# Patient Record
Sex: MALE | Race: WHITE | NOT HISPANIC OR LATINO | Employment: OTHER | URBAN - METROPOLITAN AREA
[De-identification: names, ages, dates, MRNs, and addresses within clinical notes are randomized per-mention and may not be internally consistent; named-entity substitution may affect disease eponyms.]

---

## 2017-01-31 DIAGNOSIS — E78.5 HYPERLIPIDEMIA, UNSPECIFIED HYPERLIPIDEMIA TYPE: Primary | ICD-10-CM

## 2017-02-01 ENCOUNTER — LAB VISIT (OUTPATIENT)
Dept: LAB | Facility: HOSPITAL | Age: 82
End: 2017-02-01
Attending: FAMILY MEDICINE
Payer: MEDICARE

## 2017-02-01 DIAGNOSIS — E78.5 HYPERLIPIDEMIA, UNSPECIFIED HYPERLIPIDEMIA TYPE: ICD-10-CM

## 2017-02-01 LAB
ALBUMIN SERPL BCP-MCNC: 3.7 G/DL
ALP SERPL-CCNC: 58 U/L
ALT SERPL W/O P-5'-P-CCNC: 12 U/L
ANION GAP SERPL CALC-SCNC: 8 MMOL/L
AST SERPL-CCNC: 19 U/L
BILIRUB SERPL-MCNC: 0.8 MG/DL
BUN SERPL-MCNC: 19 MG/DL
CALCIUM SERPL-MCNC: 8.9 MG/DL
CHLORIDE SERPL-SCNC: 108 MMOL/L
CHOLEST/HDLC SERPL: 3 {RATIO}
CO2 SERPL-SCNC: 24 MMOL/L
CREAT SERPL-MCNC: 1 MG/DL
EST. GFR  (AFRICAN AMERICAN): >60 ML/MIN/1.73 M^2
EST. GFR  (NON AFRICAN AMERICAN): >60 ML/MIN/1.73 M^2
GLUCOSE SERPL-MCNC: 98 MG/DL
HDL/CHOLESTEROL RATIO: 33.2 %
HDLC SERPL-MCNC: 190 MG/DL
HDLC SERPL-MCNC: 63 MG/DL
LDLC SERPL CALC-MCNC: 110.2 MG/DL
NONHDLC SERPL-MCNC: 127 MG/DL
POTASSIUM SERPL-SCNC: 4 MMOL/L
PROT SERPL-MCNC: 7 G/DL
SODIUM SERPL-SCNC: 140 MMOL/L
TRIGL SERPL-MCNC: 84 MG/DL

## 2017-02-01 PROCEDURE — 36415 COLL VENOUS BLD VENIPUNCTURE: CPT | Mod: PO

## 2017-02-01 PROCEDURE — 80061 LIPID PANEL: CPT

## 2017-02-01 PROCEDURE — 80053 COMPREHEN METABOLIC PANEL: CPT

## 2017-02-08 ENCOUNTER — OFFICE VISIT (OUTPATIENT)
Dept: FAMILY MEDICINE | Facility: CLINIC | Age: 82
End: 2017-02-08
Payer: MEDICARE

## 2017-02-08 VITALS
BODY MASS INDEX: 27.17 KG/M2 | RESPIRATION RATE: 18 BRPM | DIASTOLIC BLOOD PRESSURE: 86 MMHG | WEIGHT: 200.63 LBS | HEART RATE: 60 BPM | HEIGHT: 72 IN | SYSTOLIC BLOOD PRESSURE: 122 MMHG

## 2017-02-08 DIAGNOSIS — J06.9 UPPER RESPIRATORY TRACT INFECTION, UNSPECIFIED TYPE: Primary | ICD-10-CM

## 2017-02-08 DIAGNOSIS — E78.5 HYPERLIPIDEMIA, UNSPECIFIED HYPERLIPIDEMIA TYPE: ICD-10-CM

## 2017-02-08 DIAGNOSIS — G62.9 PERIPHERAL POLYNEUROPATHY: ICD-10-CM

## 2017-02-08 PROCEDURE — 99213 OFFICE O/P EST LOW 20 MIN: CPT | Mod: PBBFAC,PO | Performed by: FAMILY MEDICINE

## 2017-02-08 PROCEDURE — 99214 OFFICE O/P EST MOD 30 MIN: CPT | Mod: S$PBB,,, | Performed by: FAMILY MEDICINE

## 2017-02-08 PROCEDURE — 99999 PR PBB SHADOW E&M-EST. PATIENT-LVL III: CPT | Mod: PBBFAC,,, | Performed by: FAMILY MEDICINE

## 2017-02-08 RX ORDER — GABAPENTIN 300 MG/1
300 CAPSULE ORAL 2 TIMES DAILY
Qty: 180 CAPSULE | Refills: 3 | Status: SHIPPED | OUTPATIENT
Start: 2017-02-08 | End: 2018-07-31 | Stop reason: SDUPTHER

## 2017-02-08 NOTE — MR AVS SNAPSHOT
[facility]  [address]  [facility] [address]  Phone: [phone]  Fax: [phone]                  Zara Gonzáles   2017 9:30 AM   Office Visit    Description:  Male : 1933   Provider:  Fran Duncan MD   Department:  [facility]           Reason for Visit     Hyperlipidemia           Diagnoses this Visit        Comments    Upper respiratory tract infection, unspecified type    -  Primary     Hyperlipidemia, unspecified hyperlipidemia type         Peripheral polyneuropathy                To Do List           Future Appointments        Provider Department Dept Phone    2017 2:15 PM Fran Duncan MD [facility] [phone]      Goals (5 Years of Data)     None       These Medications        Disp Refills Start End    gabapentin (NEURONTIN) 300 MG capsule 180 capsule 3 2017     Take 1 capsule (300 mg total) by mouth 2 (two) times daily. - Oral    [facility] [address] [place] [address] Ph #: [phone]         [facility]     [facility] -  Assistance  Registered nurses in the [facility] provide clinical advisement, health education, appointment booking, and other advisory services.  Call for this free service at [phone].             Medications           Message regarding Medications     Verify the changes and/or additions to your medication regime listed below are the same as discussed with your clinician today.  If any of these changes or additions are incorrect, please notify your healthcare provider.        CHANGE how you are taking these medications     Start Taking Instead of    gabapentin (NEURONTIN) 300 MG capsule gabapentin (NEURONTIN) 300 MG capsule    Dosage:  Take 1 capsule (300 mg total) by mouth 2 (two) times daily. Dosage:  TAKE ONE CAPSULE BY MOUTH ONCE DAILY    Reason for Change:   Reorder            Verify that the below list of medications is an accurate representation of the medications you are currently taking.  If none reported, the list may be blank. If incorrect, please contact your healthcare provider. Carry this list with you in case of emergency.           Current Medications     aspirin (ECOTRIN) 81 MG EC tablet Take 81 mg by mouth once daily.    gabapentin (NEURONTIN) 300 MG capsule Take 1 capsule (300 mg total) by mouth 2 (two) times daily.    pravastatin (PRAVACHOL) 40 MG tablet TAKE ONE TABLET BY MOUTH ONCE DAILY           Clinical Reference Information           Your Vitals Were     BP Pulse Resp Height Weight BMI    122/86 (BP Location: Right arm, Patient Position: Sitting, BP Method: Manual) 60 18 6' (1.829 m) 91 kg (200 lb 9.9 oz) 27.21 kg/m2      Blood Pressure          Most Recent Value    BP  122/86      Allergies as of 2/8/2017     Sulfa (Sulfonamide Antibiotics)      Immunizations Administered on Date of Encounter - 2/8/2017     None      Language Assistance Services     ATTENTION: Language assistance services are available, free of charge. Please call [phone].      ATENCIÓN: Si habla español, tiene a kat disposición servicios gratuitos de asistencia lingüística. Llame al [phone].     ROSE MARY Ý: N?u b?n nói Ti?ng Vi?t, có các d?ch v? h? tr? ngôn ng? mi?n phí dành cho b?n. G?i s? [phone].         [facility] complies with applicable Federal civil rights laws and does not discriminate on the basis of race, color, national origin, age, disability, or sex.

## 2017-02-08 NOTE — PROGRESS NOTES
HPI  Zara Gonzáles is a 83 y.o. male with multiple medical diagnoses as listed in the medical history and problem list that presents for Hyperlipidemia (follow up; hm due: tetanus, zoster )  .      URI    This is a new problem. The current episode started in the past 7 days. There has been no fever. Associated symptoms include congestion, coughing and headaches. Pertinent negatives include no abdominal pain, chest pain, diarrhea, ear pain, nausea, neck pain or vomiting. He has tried decongestant for the symptoms. The treatment provided mild relief.       PAST MEDICAL HISTORY:  Past Medical History   Diagnosis Date    GERD (gastroesophageal reflux disease)     Hyperlipidemia        PAST SURGICAL HISTORY:  Past Surgical History   Procedure Laterality Date    Tonsillectomy, adenoidectomy         SOCIAL HISTORY:  Social History     Social History    Marital status:      Spouse name: N/A    Number of children: N/A    Years of education: N/A     Occupational History    Not on file.     Social History Main Topics    Smoking status: Former Smoker     Quit date: 8/16/1973    Smokeless tobacco: Never Used    Alcohol use No    Drug use: No    Sexual activity: Not on file     Other Topics Concern    Not on file     Social History Narrative       FAMILY HISTORY:  Family History   Problem Relation Age of Onset    Coronary artery disease Mother     Coronary artery disease Brother        ALLERGIES AND MEDICATIONS: updated and reviewed.  Review of patient's allergies indicates:   Allergen Reactions    Sulfa (sulfonamide antibiotics)      rash     Current Outpatient Prescriptions   Medication Sig Dispense Refill    aspirin (ECOTRIN) 81 MG EC tablet Take 81 mg by mouth once daily.      gabapentin (NEURONTIN) 300 MG capsule Take 1 capsule (300 mg total) by mouth 2 (two) times daily. 180 capsule 3    pravastatin (PRAVACHOL) 40 MG tablet TAKE ONE TABLET BY MOUTH ONCE DAILY 30 tablet 3     No current  facility-administered medications for this visit.        ROS  Review of Systems   Constitutional: Negative for appetite change and fatigue.   HENT: Positive for congestion. Negative for ear pain and hearing loss.    Eyes: Negative for visual disturbance.   Respiratory: Positive for cough. Negative for chest tightness and shortness of breath.    Cardiovascular: Negative for chest pain and palpitations.   Gastrointestinal: Negative for abdominal pain, blood in stool, constipation, diarrhea, nausea and vomiting.   Genitourinary: Negative for difficulty urinating.   Musculoskeletal: Negative for back pain, joint swelling, neck pain and neck stiffness.   Skin: Negative.    Neurological: Positive for numbness (in feet unchanged) and headaches. Negative for weakness.   Psychiatric/Behavioral: Negative for dysphoric mood.       Physical Exam  Vitals:    02/08/17 0944   BP: 122/86   Pulse: 60   Resp: 18    Body mass index is 27.21 kg/(m^2).  Weight: 91 kg (200 lb 9.9 oz)   Height: 6' (182.9 cm)     Physical Exam   Constitutional: He is oriented to person, place, and time. He appears well-developed and well-nourished. No distress.   HENT:   Head: Normocephalic and atraumatic.   Right Ear: External ear normal.   Left Ear: External ear normal.   Eyes: Conjunctivae and EOM are normal. Pupils are equal, round, and reactive to light. No scleral icterus.   Neck: Normal range of motion. Neck supple. No JVD present. No thyromegaly present.   Cardiovascular: Normal rate, regular rhythm and intact distal pulses.  Exam reveals no gallop and no friction rub.    No murmur heard.  Pulmonary/Chest: Effort normal and breath sounds normal. He has no wheezes. He has no rales.   Abdominal: Soft. Bowel sounds are normal. He exhibits no distension and no mass. There is no tenderness.   Musculoskeletal: Normal range of motion. He exhibits no edema or tenderness.   Lymphadenopathy:     He has no cervical adenopathy.   Neurological: He is alert and  oriented to person, place, and time. No cranial nerve deficit. Coordination normal.   Skin: Skin is warm and dry. No rash noted.   Psychiatric: He has a normal mood and affect.   Vitals reviewed.    Results for orders placed or performed in visit on 02/01/17   Comprehensive metabolic panel   Result Value Ref Range    Sodium 140 136 - 145 mmol/L    Potassium 4.0 3.5 - 5.1 mmol/L    Chloride 108 95 - 110 mmol/L    CO2 24 23 - 29 mmol/L    Glucose 98 70 - 110 mg/dL    BUN, Bld 19 8 - 23 mg/dL    Creatinine 1.0 0.5 - 1.4 mg/dL    Calcium 8.9 8.7 - 10.5 mg/dL    Total Protein 7.0 6.0 - 8.4 g/dL    Albumin 3.7 3.5 - 5.2 g/dL    Total Bilirubin 0.8 0.1 - 1.0 mg/dL    Alkaline Phosphatase 58 55 - 135 U/L    AST 19 10 - 40 U/L    ALT 12 10 - 44 U/L    Anion Gap 8 8 - 16 mmol/L    eGFR if African American >60.0 >60 mL/min/1.73 m^2    eGFR if non African American >60.0 >60 mL/min/1.73 m^2   Lipid panel   Result Value Ref Range    Cholesterol 190 120 - 199 mg/dL    Triglycerides 84 30 - 150 mg/dL    HDL 63 40 - 75 mg/dL    LDL Cholesterol 110.2 63.0 - 159.0 mg/dL    HDL/Chol Ratio 33.2 20.0 - 50.0 %    Total Cholesterol/HDL Ratio 3.0 2.0 - 5.0    Non-HDL Cholesterol 127 mg/dL       Health Maintenance       Date Due Completion Date    TETANUS VACCINE [birth year]/1951 ---    Zoster Vaccine 8/21/1993 ---    Lipid Panel 2/1/2022 2/1/2017          Assessment & Plan    Upper respiratory tract infection, unspecified type  - Mucinex, return to clinic if no better    Hyperlipidemia, unspecified hyperlipidemia type  - Diet and exercise education.  - Continue current therapy    Peripheral polyneuropathy  -     INCREASE  gabapentin (NEURONTIN) 300 MG capsule; Take 1 capsule (300 mg total) by mouth 2 (two) times daily.  Dispense: 180 capsule; Refill: 3

## 2017-05-02 RX ORDER — PRAVASTATIN SODIUM 40 MG/1
TABLET ORAL
Qty: 30 TABLET | Refills: 11 | Status: SHIPPED | OUTPATIENT
Start: 2017-05-02 | End: 2018-04-09 | Stop reason: SDUPTHER

## 2017-05-20 PROBLEM — K21.9 GERD (GASTROESOPHAGEAL REFLUX DISEASE): Status: ACTIVE | Noted: 2017-05-20

## 2017-05-20 PROBLEM — D64.9 NORMOCYTIC ANEMIA: Status: ACTIVE | Noted: 2017-05-20

## 2017-05-20 PROBLEM — K62.5 RECTAL BLEEDING: Status: ACTIVE | Noted: 2017-05-20

## 2017-06-02 PROBLEM — E53.8 B12 DEFICIENCY: Status: ACTIVE | Noted: 2017-06-02

## 2017-07-05 ENCOUNTER — OFFICE VISIT (OUTPATIENT)
Dept: FAMILY MEDICINE | Facility: CLINIC | Age: 82
End: 2017-07-05
Payer: MEDICARE

## 2017-07-05 VITALS
RESPIRATION RATE: 18 BRPM | WEIGHT: 193.13 LBS | HEIGHT: 72 IN | HEART RATE: 68 BPM | DIASTOLIC BLOOD PRESSURE: 82 MMHG | SYSTOLIC BLOOD PRESSURE: 136 MMHG | BODY MASS INDEX: 26.16 KG/M2

## 2017-07-05 DIAGNOSIS — E78.5 HYPERLIPIDEMIA, UNSPECIFIED HYPERLIPIDEMIA TYPE: Primary | ICD-10-CM

## 2017-07-05 DIAGNOSIS — K57.31 DIVERTICULOSIS OF LARGE INTESTINE WITH HEMORRHAGE: ICD-10-CM

## 2017-07-05 PROBLEM — K62.5 RECTAL BLEEDING: Status: RESOLVED | Noted: 2017-05-20 | Resolved: 2017-07-05

## 2017-07-05 PROCEDURE — 99999 PR PBB SHADOW E&M-EST. PATIENT-LVL III: CPT | Mod: PBBFAC,,, | Performed by: FAMILY MEDICINE

## 2017-07-05 PROCEDURE — 1159F MED LIST DOCD IN RCRD: CPT | Mod: ,,, | Performed by: FAMILY MEDICINE

## 2017-07-05 PROCEDURE — 99213 OFFICE O/P EST LOW 20 MIN: CPT | Mod: PBBFAC,PO | Performed by: FAMILY MEDICINE

## 2017-07-05 PROCEDURE — 99213 OFFICE O/P EST LOW 20 MIN: CPT | Mod: S$PBB,,, | Performed by: FAMILY MEDICINE

## 2017-07-05 PROCEDURE — 1126F AMNT PAIN NOTED NONE PRSNT: CPT | Mod: ,,, | Performed by: FAMILY MEDICINE

## 2017-07-05 RX ORDER — WITCH HAZEL 50 %
5000 PADS, MEDICATED (EA) TOPICAL DAILY
COMMUNITY
End: 2018-04-09

## 2017-07-05 RX ORDER — EPINEPHRINE 0.22MG
100 AEROSOL WITH ADAPTER (ML) INHALATION DAILY
COMMUNITY
End: 2018-04-09

## 2017-07-05 NOTE — PROGRESS NOTES
Subjective:       Patient ID: Zara Gonzáles is a 83 y.o. male    Chief Complaint: Hyperlipidemia (follow up; hm due: tetanus, zoster)    HPI  Patient here for interval evaluation  He is following up a recent hospitalization for rectal bleeding.  This resolved spontaneously.  EGD and colonoscopy demonstrated diverticulosis and internal hemorrhoids.  No new complaints    Review of Systems      Objective:   Physical Exam   Constitutional: He is oriented to person, place, and time. He appears well-developed and well-nourished. No distress.   Neurological: He is alert and oriented to person, place, and time.   Vitals reviewed.        Assessment:       1. Hyperlipidemia, unspecified hyperlipidemia type     2. Diverticulosis of large intestine with hemorrhage           Plan:       Hyperlipidemia, unspecified hyperlipidemia type  - Continue current therapy  - Diet and exercise education.    Diverticulosis of large intestine with hemorrhage  - Discussed bowel regimen

## 2018-03-26 ENCOUNTER — TELEPHONE (OUTPATIENT)
Dept: FAMILY MEDICINE | Facility: CLINIC | Age: 83
End: 2018-03-26

## 2018-03-26 NOTE — TELEPHONE ENCOUNTER
Spoke to pt to reschedule appt on 3/27. Attempted to schedule pt on 4/23. Pt states that this is the second time he has had to reschedule and he cannot wait another month. Please advise.

## 2018-03-27 NOTE — TELEPHONE ENCOUNTER
----- Message from Elfego Sims sent at 3/27/2018  9:48 AM CDT -----  Contact: pt's  Zara   Pt's  is calling to see if he can apeak with nurse Mary Anne about rescheduling his appt and his wife appt(wife's MRN#[id number]) I tried to reschedule but there is nothing until July and pt does not want to have to wait that long)  Call Back#[phone]  Thanks

## 2018-04-09 ENCOUNTER — OFFICE VISIT (OUTPATIENT)
Dept: FAMILY MEDICINE | Facility: CLINIC | Age: 83
End: 2018-04-09
Payer: MEDICARE

## 2018-04-09 VITALS
SYSTOLIC BLOOD PRESSURE: 139 MMHG | HEIGHT: 72 IN | WEIGHT: 198.88 LBS | DIASTOLIC BLOOD PRESSURE: 80 MMHG | HEART RATE: 56 BPM | BODY MASS INDEX: 26.94 KG/M2 | OXYGEN SATURATION: 96 %

## 2018-04-09 DIAGNOSIS — M19.041 PRIMARY OSTEOARTHRITIS OF BOTH HANDS: Primary | ICD-10-CM

## 2018-04-09 DIAGNOSIS — G62.9 PERIPHERAL POLYNEUROPATHY: ICD-10-CM

## 2018-04-09 DIAGNOSIS — M19.042 PRIMARY OSTEOARTHRITIS OF BOTH HANDS: Primary | ICD-10-CM

## 2018-04-09 PROCEDURE — 99213 OFFICE O/P EST LOW 20 MIN: CPT | Mod: S$PBB,,, | Performed by: FAMILY MEDICINE

## 2018-04-09 PROCEDURE — 99213 OFFICE O/P EST LOW 20 MIN: CPT | Mod: PBBFAC,PO | Performed by: FAMILY MEDICINE

## 2018-04-09 PROCEDURE — 99999 PR PBB SHADOW E&M-EST. PATIENT-LVL III: CPT | Mod: PBBFAC,,, | Performed by: FAMILY MEDICINE

## 2018-04-09 RX ORDER — PRAVASTATIN SODIUM 40 MG/1
40 TABLET ORAL DAILY
Qty: 90 TABLET | Refills: 3 | Status: SHIPPED | OUTPATIENT
Start: 2018-04-09 | End: 2019-05-05 | Stop reason: SDUPTHER

## 2018-04-09 NOTE — PROGRESS NOTES
Subjective:       Patient ID: Zara Gonzáles is a 84 y.o. male    Chief Complaint: Follow-up (Pt. here for a check up, Pt. reports doing well except at night his get stiff but when he wakes up they are not.)    HPI  Here today for interval evaluation  Stable, but stiffness in hands that worsens overnight    Review of Systems     Objective:   Physical Exam   Constitutional: He appears well-developed and well-nourished.   HENT:   Head: Normocephalic and atraumatic.   Eyes: EOM are normal. Pupils are equal, round, and reactive to light.   Neck: Neck supple.   Cardiovascular: Normal rate, regular rhythm and normal heart sounds.  Exam reveals no gallop and no friction rub.    No murmur heard.  Pulmonary/Chest: Effort normal and breath sounds normal. He has no wheezes. He has no rales.   Vitals reviewed.       Assessment:       1. Primary osteoarthritis of both hands          Plan:       Primary osteoarthritis of both hands  - Continue current therapy  - Hand strengthening

## 2018-07-31 DIAGNOSIS — G62.9 PERIPHERAL POLYNEUROPATHY: ICD-10-CM

## 2018-08-01 RX ORDER — GABAPENTIN 300 MG/1
CAPSULE ORAL
Qty: 180 CAPSULE | Refills: 2 | Status: SHIPPED | OUTPATIENT
Start: 2018-08-01 | End: 2019-08-05 | Stop reason: SDUPTHER

## 2018-10-17 ENCOUNTER — LAB VISIT (OUTPATIENT)
Dept: LAB | Facility: HOSPITAL | Age: 83
End: 2018-10-17
Attending: FAMILY MEDICINE
Payer: MEDICARE

## 2018-10-17 ENCOUNTER — OFFICE VISIT (OUTPATIENT)
Dept: FAMILY MEDICINE | Facility: CLINIC | Age: 83
End: 2018-10-17
Payer: MEDICARE

## 2018-10-17 VITALS
HEIGHT: 72 IN | SYSTOLIC BLOOD PRESSURE: 122 MMHG | BODY MASS INDEX: 26.67 KG/M2 | WEIGHT: 196.88 LBS | HEART RATE: 56 BPM | DIASTOLIC BLOOD PRESSURE: 78 MMHG

## 2018-10-17 DIAGNOSIS — E78.5 HYPERLIPIDEMIA, UNSPECIFIED HYPERLIPIDEMIA TYPE: Primary | ICD-10-CM

## 2018-10-17 DIAGNOSIS — E78.5 HYPERLIPIDEMIA, UNSPECIFIED HYPERLIPIDEMIA TYPE: ICD-10-CM

## 2018-10-17 LAB
CHOLEST SERPL-MCNC: 182 MG/DL
CHOLEST/HDLC SERPL: 2.9 {RATIO}
HDLC SERPL-MCNC: 63 MG/DL
HDLC SERPL: 34.6 %
LDLC SERPL CALC-MCNC: 92.4 MG/DL
NONHDLC SERPL-MCNC: 119 MG/DL
TRIGL SERPL-MCNC: 133 MG/DL

## 2018-10-17 PROCEDURE — 99213 OFFICE O/P EST LOW 20 MIN: CPT | Mod: S$PBB,,, | Performed by: FAMILY MEDICINE

## 2018-10-17 PROCEDURE — 99213 OFFICE O/P EST LOW 20 MIN: CPT | Mod: PBBFAC,PO | Performed by: FAMILY MEDICINE

## 2018-10-17 PROCEDURE — 36415 COLL VENOUS BLD VENIPUNCTURE: CPT | Mod: PO

## 2018-10-17 PROCEDURE — 99999 PR PBB SHADOW E&M-EST. PATIENT-LVL III: CPT | Mod: PBBFAC,,, | Performed by: FAMILY MEDICINE

## 2018-10-17 PROCEDURE — 80061 LIPID PANEL: CPT

## 2018-10-17 PROCEDURE — 90662 IIV NO PRSV INCREASED AG IM: CPT | Mod: PBBFAC,PO

## 2018-10-17 NOTE — PROGRESS NOTES
Subjective:       Patient ID: Zara Gonzáles is a 85 y.o. male    Chief Complaint: Osteoarthritis (6 month f/u. Hm due flu shot)    HPI  Here today for interval evaluation  He was seen 4/18 in ER for nausea.  CT head and ECG were normal.  He reports that his symptoms resolved with Zofran and has not returned.  He has no other complaints    Review of Systems     Objective:   Physical Exam   Constitutional: He appears well-developed and well-nourished.   HENT:   Head: Normocephalic and atraumatic.   Eyes: EOM are normal. Pupils are equal, round, and reactive to light.   Neck: Neck supple.   Cardiovascular: Normal rate, regular rhythm and normal heart sounds. Exam reveals no gallop and no friction rub.   No murmur heard.  Pulmonary/Chest: Effort normal and breath sounds normal. He has no wheezes. He has no rales.   Vitals reviewed.       Assessment:       1. Hyperlipidemia, unspecified hyperlipidemia type  Lipid panel        Plan:       Hyperlipidemia, unspecified hyperlipidemia type  -     Lipid panel; Future; Expected date: 10/17/2018  - Diet and exercise education.

## 2018-10-19 ENCOUNTER — TELEPHONE (OUTPATIENT)
Dept: FAMILY MEDICINE | Facility: CLINIC | Age: 83
End: 2018-10-19

## 2018-10-19 NOTE — TELEPHONE ENCOUNTER
----- Message from Casey Bear sent at 10/19/2018  9:46 AM CDT -----  Contact: self   Patient missed a call from your office regarding test results, please call back at [phone] (klrl)

## 2018-12-21 ENCOUNTER — PES CALL (OUTPATIENT)
Dept: ADMINISTRATIVE | Facility: CLINIC | Age: 83
End: 2018-12-21

## 2019-04-17 ENCOUNTER — TELEPHONE (OUTPATIENT)
Dept: FAMILY MEDICINE | Facility: CLINIC | Age: 84
End: 2019-04-17

## 2019-04-17 ENCOUNTER — OFFICE VISIT (OUTPATIENT)
Dept: FAMILY MEDICINE | Facility: CLINIC | Age: 84
End: 2019-04-17
Payer: MEDICARE

## 2019-04-17 VITALS
BODY MASS INDEX: 26.07 KG/M2 | DIASTOLIC BLOOD PRESSURE: 84 MMHG | WEIGHT: 192.25 LBS | HEART RATE: 78 BPM | OXYGEN SATURATION: 96 % | SYSTOLIC BLOOD PRESSURE: 136 MMHG

## 2019-04-17 DIAGNOSIS — E78.5 HYPERLIPIDEMIA, UNSPECIFIED HYPERLIPIDEMIA TYPE: Primary | ICD-10-CM

## 2019-04-17 PROCEDURE — 99212 OFFICE O/P EST SF 10 MIN: CPT | Mod: S$PBB,,, | Performed by: FAMILY MEDICINE

## 2019-04-17 PROCEDURE — 99999 PR PBB SHADOW E&M-EST. PATIENT-LVL III: ICD-10-PCS | Mod: PBBFAC,,, | Performed by: FAMILY MEDICINE

## 2019-04-17 PROCEDURE — 99999 PR PBB SHADOW E&M-EST. PATIENT-LVL III: CPT | Mod: PBBFAC,,, | Performed by: FAMILY MEDICINE

## 2019-04-17 PROCEDURE — 99213 OFFICE O/P EST LOW 20 MIN: CPT | Mod: PBBFAC,PO | Performed by: FAMILY MEDICINE

## 2019-04-17 PROCEDURE — 99212 PR OFFICE/OUTPT VISIT, EST, LEVL II, 10-19 MIN: ICD-10-PCS | Mod: S$PBB,,, | Performed by: FAMILY MEDICINE

## 2019-04-17 NOTE — TELEPHONE ENCOUNTER
----- Message from Leeanna Lopez sent at 4/17/2019  3:55 PM CDT -----  Contact: self at check out  This patient was told to follow up to see Dr. Duncan in 6 months.

## 2019-04-17 NOTE — PROGRESS NOTES
Subjective:       Patient ID: Zara Gonzáles is a 85 y.o. male    Chief Complaint: No chief complaint on file.    HPI  Here today for interval evaluation  Here to review laboratory data    Review of Systems     Objective:   Physical Exam   Constitutional: He is oriented to person, place, and time. He appears well-developed and well-nourished. No distress.   Neurological: He is alert and oriented to person, place, and time.   Vitals reviewed.    Results for orders placed or performed in visit on 10/17/18   Lipid panel   Result Value Ref Range    Cholesterol 182 120 - 199 mg/dL    Triglycerides 133 30 - 150 mg/dL    HDL 63 40 - 75 mg/dL    LDL Cholesterol 92.4 63.0 - 159.0 mg/dL    HDL/Chol Ratio 34.6 20.0 - 50.0 %    Total Cholesterol/HDL Ratio 2.9 2.0 - 5.0    Non-HDL Cholesterol 119 mg/dL          Assessment:       1. Hyperlipidemia, unspecified hyperlipidemia type          Plan:       Hyperlipidemia, unspecified hyperlipidemia type  - Continue current therapy  - Diet and exercise education.  - Follow up in about 6 months (around 10/17/2019).

## 2019-05-06 RX ORDER — PRAVASTATIN SODIUM 40 MG/1
TABLET ORAL
Qty: 90 TABLET | Refills: 3 | Status: SHIPPED | OUTPATIENT
Start: 2019-05-06 | End: 2020-05-21 | Stop reason: SDUPTHER

## 2019-07-10 ENCOUNTER — NURSE TRIAGE (OUTPATIENT)
Dept: ADMINISTRATIVE | Facility: CLINIC | Age: 84
End: 2019-07-10

## 2019-07-10 NOTE — TELEPHONE ENCOUNTER
Blood pressure was 110/48, taken 30 minutes ago, and feels lightheaded. Patient repeated BP on phone and it was 131/68 on the right arm. Patient states that he still feels lightheaded. Does not take blood pressure medication. The left arm 113/59, pulse 76 at the time of the call. Patient advised to go to the Emergency Department. Patient informed that he should have someone drive him. Patient states that someone can bring him and that he will go to [facility]. Patient verbalized understanding.     Reason for Disposition   Fall in systolic BP > 20 mm Hg from normal and feeling dizzy, lightheaded, or weak    Protocols used: LOW BLOOD PRESSURE-A-OH

## 2019-08-05 DIAGNOSIS — G62.9 PERIPHERAL POLYNEUROPATHY: ICD-10-CM

## 2019-08-05 RX ORDER — GABAPENTIN 300 MG/1
CAPSULE ORAL
Qty: 180 CAPSULE | Refills: 3 | Status: SHIPPED | OUTPATIENT
Start: 2019-08-05 | End: 2020-08-14

## 2019-10-07 ENCOUNTER — PES CALL (OUTPATIENT)
Dept: ADMINISTRATIVE | Facility: CLINIC | Age: 84
End: 2019-10-07

## 2019-10-17 ENCOUNTER — PES CALL (OUTPATIENT)
Dept: ADMINISTRATIVE | Facility: CLINIC | Age: 84
End: 2019-10-17

## 2019-10-24 ENCOUNTER — IMMUNIZATION (OUTPATIENT)
Dept: FAMILY MEDICINE | Facility: CLINIC | Age: 84
End: 2019-10-24
Payer: MEDICARE

## 2019-10-24 PROCEDURE — 90662 IIV NO PRSV INCREASED AG IM: CPT | Mod: PBBFAC,PO

## 2020-05-21 DIAGNOSIS — E78.00 PURE HYPERCHOLESTEROLEMIA: Primary | ICD-10-CM

## 2020-05-21 RX ORDER — PRAVASTATIN SODIUM 40 MG/1
TABLET ORAL
Qty: 90 TABLET | Refills: 0 | Status: SHIPPED | OUTPATIENT
Start: 2020-05-21 | End: 2020-08-24 | Stop reason: SDUPTHER

## 2020-05-21 NOTE — TELEPHONE ENCOUNTER
Provider Staff:     Please schedule patient for Labs (CMP, LIPIDS)    Please also check with your provider if any further labs need to be added and scheduled together.    Thanks!  [facility]     Appointments  past 12m or future 3m with PCP    Date Provider   Last Visit   4/17/2019 Fran Duncan MD   Next Visit   7/9/2020 Fran Duncan MD     Note composed:2:32 PM 05/21/2020

## 2020-05-21 NOTE — TELEPHONE ENCOUNTER
----- Message from Eduarda Andrade sent at 5/21/2020  1:19 PM CDT -----  Contact: PT   PT called to get a refill on one of his prescriptions. Said O[facility] has been trying to get in touch with the doctor's office but hasn't been able to get a hold of anyone.     pravastatin (PRAVACHOL) 40 MG tablet  TAKE 1 TABLET(40 MG) BY MOUTH EVERY DAY  90 tablet     [id number] [place] [address] & [address] [address]  Phone: [phone] Fax: [phone]    Callback: [phone]

## 2020-05-21 NOTE — TELEPHONE ENCOUNTER
Refill Authorization Note    is requesting a refill authorization.    Brief assessment and rationale for refill: APPROVE: needs labs     Medication-related problems identified: Requires labs    Medication Therapy Plan: NTBO(LIPIDS, CMP)    Medication reconciliation completed: No                         Comments:      Requested Prescriptions   Signed Prescriptions Disp Refills    pravastatin (PRAVACHOL) 40 MG tablet 90 tablet 0     Sig: TAKE 1 TABLET(40 MG) BY MOUTH EVERY DAY       Cardiovascular:  Antilipid - Statins Failed - 5/21/2020  1:28 PM        Failed - Lipid Panel completed in last 360 days     Lab Results   Component Value Date    CHOL 182 10/17/2018    HDL 63 10/17/2018    LDLCALC 92.4 10/17/2018    TRIG 133 10/17/2018             Passed - Patient is at least 18 years old        Passed - Office visit in past 12 months or future 90 days.     Recent Outpatient Visits            1 year ago Hyperlipidemia, unspecified hyperlipidemia type    [facility] Fran Duncan MD    1 year ago Hyperlipidemia, unspecified hyperlipidemia type    [facility] Fran Duncan MD    2 years ago Primary osteoarthritis of both hands    [facility] Blossom Duncan MD    2 years ago Hyperlipidemia, unspecified hyperlipidemia type    [facility] Fran Duncan MD    2 years ago Normocytic anemia    [facility]          Future Appointments              In 1 month Fran Duncan MD [facility]                Passed - ALT is 94 or below and within 360 days     ALT   Date Value Ref Range Status   07/10/2019 27 10 - 44 U/L Final   04/17/2018 28 10 - 44 U/L Final   05/20/2017 30 10 - 44 U/L Final              Passed - AST is 54 or below and within 360 days     AST   Date Value Ref Range Status   07/10/2019 26 17 - 59 U/L Final   04/17/2018 23 17 - 59 U/L Final   05/20/2017 34 17 - 59 U/L  Final               Appointments  past 12m or future 3m with PCP    Date Provider   Last Visit   4/17/2019 Fran Duncan MD   Next Visit   7/9/2020 Fran Duncan MD   ED visits in past 90 days: [unfilled]     Note composed:2:31 PM 05/21/2020

## 2020-07-01 ENCOUNTER — LAB VISIT (OUTPATIENT)
Dept: LAB | Facility: HOSPITAL | Age: 85
End: 2020-07-01
Attending: FAMILY MEDICINE
Payer: MEDICARE

## 2020-07-01 DIAGNOSIS — E78.00 PURE HYPERCHOLESTEROLEMIA: ICD-10-CM

## 2020-07-01 LAB
ALBUMIN SERPL BCP-MCNC: 3.6 G/DL (ref 3.5–5.2)
ALP SERPL-CCNC: 59 U/L (ref 55–135)
ALT SERPL W/O P-5'-P-CCNC: 12 U/L (ref 10–44)
ANION GAP SERPL CALC-SCNC: 8 MMOL/L (ref 8–16)
AST SERPL-CCNC: 20 U/L (ref 10–40)
BILIRUB SERPL-MCNC: 0.4 MG/DL (ref 0.1–1)
BUN SERPL-MCNC: 16 MG/DL (ref 8–23)
CALCIUM SERPL-MCNC: 9 MG/DL (ref 8.7–10.5)
CHLORIDE SERPL-SCNC: 107 MMOL/L (ref 95–110)
CHOLEST SERPL-MCNC: 169 MG/DL (ref 120–199)
CHOLEST/HDLC SERPL: 2.6 {RATIO} (ref 2–5)
CO2 SERPL-SCNC: 25 MMOL/L (ref 23–29)
CREAT SERPL-MCNC: 1.1 MG/DL (ref 0.5–1.4)
EST. GFR  (AFRICAN AMERICAN): >60 ML/MIN/1.73 M^2
EST. GFR  (NON AFRICAN AMERICAN): >60 ML/MIN/1.73 M^2
GLUCOSE SERPL-MCNC: 124 MG/DL (ref 70–110)
HDLC SERPL-MCNC: 65 MG/DL (ref 40–75)
HDLC SERPL: 38.5 % (ref 20–50)
LDLC SERPL CALC-MCNC: 58.8 MG/DL (ref 63–159)
NONHDLC SERPL-MCNC: 104 MG/DL
POTASSIUM SERPL-SCNC: 4.2 MMOL/L (ref 3.5–5.1)
PROT SERPL-MCNC: 6.9 G/DL (ref 6–8.4)
SODIUM SERPL-SCNC: 140 MMOL/L (ref 136–145)
TRIGL SERPL-MCNC: 226 MG/DL (ref 30–150)

## 2020-07-01 PROCEDURE — 80053 COMPREHEN METABOLIC PANEL: CPT

## 2020-07-01 PROCEDURE — 36415 COLL VENOUS BLD VENIPUNCTURE: CPT | Mod: PO

## 2020-07-01 PROCEDURE — 80061 LIPID PANEL: CPT

## 2020-07-14 ENCOUNTER — TELEPHONE (OUTPATIENT)
Dept: FAMILY MEDICINE | Facility: CLINIC | Age: 85
End: 2020-07-14

## 2020-07-14 DIAGNOSIS — R73.9 HYPERGLYCEMIA: Primary | ICD-10-CM

## 2020-07-14 NOTE — TELEPHONE ENCOUNTER
----- Message from Ce Monreal sent at 7/14/2020  8:16 AM CDT -----  Regarding: apt  Contact: patient  Type: Needs Medical Advice  Who Called:  patient  Best Call Back Number: [phone]  Additional Information: Patient is still waiting for nurse that cancelled his apt. To return call to reschedule annual apt.  Please call to advise and schedule.  Sent message to teams .  Thanks!

## 2020-08-14 DIAGNOSIS — G62.9 PERIPHERAL POLYNEUROPATHY: ICD-10-CM

## 2020-08-14 RX ORDER — GABAPENTIN 300 MG/1
CAPSULE ORAL
Qty: 180 CAPSULE | Refills: 3 | Status: SHIPPED | OUTPATIENT
Start: 2020-08-14 | End: 2020-10-15 | Stop reason: SDUPTHER

## 2020-08-14 NOTE — PROGRESS NOTES
Refill Routing Note   Medication(s) are not appropriate for processing by [facility]:       - Outside of protocol           Medication reconciliation completed: No      Automatic Epic Generated Protocol Data:        Requested Prescriptions   Pending Prescriptions Disp Refills    gabapentin (NEURONTIN) 300 MG capsule [Pharmacy Med Name: GABAPENTIN 300MG CAPSULES] 180 capsule 3     Sig: TAKE 1 CAPSULE(300 MG) BY MOUTH TWICE DAILY       Anticonvulsants Protocol Passed - 8/14/2020  8:12 AM        Passed - Visit with Authorizing provider in past 9 months or upcoming 90 days              Appointments  past 12m or future 3m with PCP    Date Provider   Last Visit   4/17/2019 Fran Duncan MD   Next Visit   10/15/2020 Fran Duncan MD   ED visits in past 90 days: 0     Note composed:9:30 AM 08/14/2020

## 2020-08-24 NOTE — TELEPHONE ENCOUNTER
No new care gaps identified.  Powered by [employer]. Reference number: [id number]. 8/24/2020 8:09:23 AM ALEJANDRINAT

## 2020-08-24 NOTE — TELEPHONE ENCOUNTER
----- Message from Hedy Umñaa sent at 8/24/2020  8:02 AM CDT -----  Regarding: Refill  Contact: Patient  Type:  RX Refill Request    Who Called:  Patient  Refill or New Rx:  Refill  RX Name and Strength: pravastatin (PRAVACHOL) 40 MG tablet  How is the patient currently taking it? (ex. 1XDay):  1XDay  Is this a 30 day or 90 day RX:  90  Preferred Pharmacy with phone number:  [id number] [address] [phone] (Phone)   Local or Mail Order:  Local  Ordering Provider:  Dakota  Best Call Back Number:  [phone]  Additional Information:

## 2020-08-25 RX ORDER — PRAVASTATIN SODIUM 40 MG/1
TABLET ORAL
Qty: 90 TABLET | Refills: 3 | Status: SHIPPED | OUTPATIENT
Start: 2020-08-25 | End: 2020-10-15 | Stop reason: SDUPTHER

## 2020-08-25 NOTE — PROGRESS NOTES
Refill Routing Note   Medication(s) are not appropriate for processing by [facility]:       - Patient has been seen in the Emergency Room/Department since the last PCP visit        Medication Therapy Plan: CDMR; FOVS; ED VISIT(1/6/20-EPISTAXIS), DEFER TO YOU  Medication reconciliation completed: No      Automatic Epic Generated Protocol Data:        Requested Prescriptions   Pending Prescriptions Disp Refills    pravastatin (PRAVACHOL) 40 MG tablet 90 tablet 0     Sig: TAKE 1 TABLET(40 MG) BY MOUTH EVERY DAY       Cardiovascular:  Antilipid - Statins Passed - 8/24/2020  8:13 AM        Passed - Patient is at least 18 years old        Passed - Office visit in past 12 months or future 90 days.     Recent Outpatient Visits            1 year ago Hyperlipidemia, unspecified hyperlipidemia type    [facility] Fran Duncan MD    1 year ago Hyperlipidemia, unspecified hyperlipidemia type    [facility] Fran Duncan MD    2 years ago Primary osteoarthritis of both hands    [facility] Fran Duncan MD    3 years ago Hyperlipidemia, unspecified hyperlipidemia type    [facility] Fran Duncan MD    3 years ago Normocytic anemia    [facility]          Future Appointments              In 1 month LAB, [place] [facility]    In 1 month Fran Duncan MD [facility]                Passed - Lipid Panel completed in last 360 days     Lab Results   Component Value Date    CHOL 169 07/01/2020    HDL 65 07/01/2020    LDLCALC 58.8 (L) 07/01/2020    TRIG 226 (H) 07/01/2020             Passed - ALT is 94 or below and within 360 days     ALT   Date Value Ref Range Status   07/01/2020 12 10 - 44 U/L Final   07/10/2019 27 10 - 44 U/L Final   04/17/2018 28 10 - 44 U/L Final              Passed - AST is 54 or below and within 360 days     AST   Date Value  Ref Range Status   07/01/2020 20 10 - 40 U/L Final   07/10/2019 26 17 - 59 U/L Final   04/17/2018 23 17 - 59 U/L Final                    Appointments  past 12m or future 3m with PCP    Date Provider   Last Visit   4/17/2019 Fran Duncan MD   Next Visit   10/15/2020 Fran Duncan MD   ED visits in past 90 days: 0     Note composed:11:08 AM 08/25/2020

## 2020-08-26 ENCOUNTER — TELEPHONE (OUTPATIENT)
Dept: FAMILY MEDICINE | Facility: CLINIC | Age: 85
End: 2020-08-26

## 2020-10-12 ENCOUNTER — LAB VISIT (OUTPATIENT)
Dept: LAB | Facility: HOSPITAL | Age: 85
End: 2020-10-12
Attending: FAMILY MEDICINE
Payer: MEDICARE

## 2020-10-12 ENCOUNTER — TELEPHONE (OUTPATIENT)
Dept: FAMILY MEDICINE | Facility: CLINIC | Age: 85
End: 2020-10-12

## 2020-10-12 DIAGNOSIS — R73.9 HYPERGLYCEMIA: ICD-10-CM

## 2020-10-12 LAB
ESTIMATED AVG GLUCOSE: 108 MG/DL (ref 68–131)
HBA1C MFR BLD HPLC: 5.4 % (ref 4–5.6)

## 2020-10-12 PROCEDURE — 36415 COLL VENOUS BLD VENIPUNCTURE: CPT | Mod: PO

## 2020-10-12 PROCEDURE — 83036 HEMOGLOBIN GLYCOSYLATED A1C: CPT

## 2020-10-15 ENCOUNTER — TELEPHONE (OUTPATIENT)
Dept: FAMILY MEDICINE | Facility: CLINIC | Age: 85
End: 2020-10-15

## 2020-10-15 ENCOUNTER — OFFICE VISIT (OUTPATIENT)
Dept: FAMILY MEDICINE | Facility: CLINIC | Age: 85
End: 2020-10-15
Payer: MEDICARE

## 2020-10-15 VITALS
HEIGHT: 72 IN | HEART RATE: 72 BPM | BODY MASS INDEX: 24.69 KG/M2 | DIASTOLIC BLOOD PRESSURE: 80 MMHG | WEIGHT: 182.31 LBS | SYSTOLIC BLOOD PRESSURE: 132 MMHG | OXYGEN SATURATION: 98 %

## 2020-10-15 DIAGNOSIS — Z23 NEED FOR VACCINATION: ICD-10-CM

## 2020-10-15 DIAGNOSIS — G62.9 PERIPHERAL POLYNEUROPATHY: ICD-10-CM

## 2020-10-15 DIAGNOSIS — D64.9 NORMOCYTIC ANEMIA: ICD-10-CM

## 2020-10-15 DIAGNOSIS — E78.2 MIXED HYPERLIPIDEMIA: Primary | ICD-10-CM

## 2020-10-15 DIAGNOSIS — E53.8 B12 DEFICIENCY: ICD-10-CM

## 2020-10-15 PROCEDURE — 99214 OFFICE O/P EST MOD 30 MIN: CPT | Mod: S$PBB,,, | Performed by: INTERNAL MEDICINE

## 2020-10-15 PROCEDURE — 90694 VACC AIIV4 NO PRSRV 0.5ML IM: CPT | Mod: PBBFAC,PO

## 2020-10-15 PROCEDURE — 99999 PR PBB SHADOW E&M-EST. PATIENT-LVL III: ICD-10-PCS | Mod: PBBFAC,,, | Performed by: INTERNAL MEDICINE

## 2020-10-15 PROCEDURE — 99214 PR OFFICE/OUTPT VISIT, EST, LEVL IV, 30-39 MIN: ICD-10-PCS | Mod: S$PBB,,, | Performed by: INTERNAL MEDICINE

## 2020-10-15 PROCEDURE — 99999 PR PBB SHADOW E&M-EST. PATIENT-LVL III: CPT | Mod: PBBFAC,,, | Performed by: INTERNAL MEDICINE

## 2020-10-15 PROCEDURE — G0008 ADMIN INFLUENZA VIRUS VAC: HCPCS | Mod: PBBFAC,PO

## 2020-10-15 PROCEDURE — 99213 OFFICE O/P EST LOW 20 MIN: CPT | Mod: PBBFAC,PO,25 | Performed by: INTERNAL MEDICINE

## 2020-10-15 RX ORDER — PRAVASTATIN SODIUM 40 MG/1
TABLET ORAL
Qty: 90 TABLET | Refills: 3 | Status: SHIPPED | OUTPATIENT
Start: 2020-10-15 | End: 2021-03-08 | Stop reason: SDUPTHER

## 2020-10-15 RX ORDER — GABAPENTIN 300 MG/1
300 CAPSULE ORAL 2 TIMES DAILY
Qty: 180 CAPSULE | Refills: 3 | Status: SHIPPED | OUTPATIENT
Start: 2020-10-15 | End: 2021-03-08 | Stop reason: SDUPTHER

## 2020-10-15 NOTE — PROGRESS NOTES
Subjective     Zara Gonzáles is a 87 y.o. old, male here for Annual Exam    Patient is here to establish care, med refills.  He is an 88 y/o with PMH of HLD, peripheral neuropathy, B12 def    Reviewed his labs.  HLD stable, has been on pravastatin for years with no recent changes or SE's.    Neuropathy is bothersome at times but gabapentin seems to help.    H/o B12 deficiency, not checked in a few years.    His wife recently . Daughter contacts him daily, friends at his condo. He is active, walks on a daily basis and does all his own ADL's      Review of Systems   Constitutional: Negative for chills and fever.   HENT: Negative for ear pain and sinus pain.    Eyes: Negative for blurred vision and pain.   Respiratory: Negative for cough and shortness of breath.    Cardiovascular: Negative for chest pain and palpitations.   Gastrointestinal: Negative for abdominal pain and nausea.   Genitourinary: Negative for dysuria and frequency.   Musculoskeletal: Negative for joint pain and myalgias.   Skin: Negative for itching and rash.   Neurological: Negative for weakness and headaches.        Intermittent L hand tingling (ulnar nerve compressed)   Endo/Heme/Allergies: Negative for environmental allergies. Does not bruise/bleed easily.   Psychiatric/Behavioral: Negative for depression. The patient does not have insomnia.      Medications     Outpatient Medications Marked as Taking for the 10/15/20 encounter (Office Visit) with Judd Honeycutt MD   Medication Sig Dispense Refill    gabapentin (NEURONTIN) 300 MG capsule Take 1 capsule (300 mg total) by mouth 2 (two) times a day. 180 capsule 3    multivit-min/FA/lycopen/lutein (CENTRUM SILVER MEN ORAL) Take by mouth.      pravastatin (PRAVACHOL) 40 MG tablet TAKE 1 TABLET(40 MG) BY MOUTH EVERY DAY 90 tablet 3    [DISCONTINUED] gabapentin (NEURONTIN) 300 MG capsule TAKE 1 CAPSULE(300 MG) BY MOUTH TWICE DAILY 180 capsule 3    [DISCONTINUED] pravastatin  (PRAVACHOL) 40 MG tablet TAKE 1 TABLET(40 MG) BY MOUTH EVERY DAY 90 tablet 3     Objective     /80   Pulse 72   Ht 6' (1.829 m)   Wt 82.7 kg (182 lb 5.1 oz)   SpO2 98%   BMI 24.73 kg/m²   Physical Exam   Constitutional: He is oriented to person, place, and time. He appears well-developed. No distress.   HENT:   Head: Normocephalic and atraumatic.   Mouth/Throat: Oropharynx is clear and moist and mucous membranes are normal.   Eyes: Pupils are equal, round, and reactive to light. Conjunctivae are normal.   Neck: Neck supple. No thyroid mass and no thyromegaly present.   Cardiovascular: Normal rate, regular rhythm and normal heart sounds.   No murmur heard.  Pulmonary/Chest: Breath sounds normal. No respiratory distress.   Abdominal: Soft. Normal appearance and bowel sounds are normal. There is no abdominal tenderness.   Musculoskeletal:         General: No deformity or edema.   Lymphadenopathy:     He has no cervical adenopathy.        Right: No supraclavicular adenopathy present.        Left: No supraclavicular adenopathy present.   Neurological: He is alert and oriented to person, place, and time.   Skin: Skin is warm and dry. No rash noted.   Psychiatric: He has a normal mood and affect. His behavior is normal.     Assessment and Plan     1. Peripheral polyneuropathy  - gabapentin (NEURONTIN) 300 MG capsule; Take 1 capsule (300 mg total) by mouth 2 (two) times a day.  Dispense: 180 capsule; Refill: 3    2. Mixed hyperlipidemia  - pravastatin (PRAVACHOL) 40 MG tablet; TAKE 1 TABLET(40 MG) BY MOUTH EVERY DAY  Dispense: 90 tablet; Refill: 3    3. Normocytic anemia  Recheck labs at f/u    4. B12 deficiency    5. Need for vaccination  - Influenza - Quadrivalent (Adjuvanted)    ___________________  Judd Honeycutt MD  Internal Medicine and Pediatrics

## 2020-10-16 NOTE — TELEPHONE ENCOUNTER
"Spoke with pt, states that he saw Dr Honeycutt yesterday, but wants to keep Derrick as PCP, per Dr Duncan that is ok bc "he loves me", pt notified that [facility] schedule is not open in March 2021 at this time and will request a Recall letter to remind him, pt verbalized understanding  "

## 2020-12-20 ENCOUNTER — TELEPHONE (OUTPATIENT)
Dept: FAMILY MEDICINE | Facility: CLINIC | Age: 85
End: 2020-12-20

## 2020-12-20 NOTE — TELEPHONE ENCOUNTER
----- Message from Hari Manzo sent at 12/18/2020  9:43 AM CST -----  Regarding: orders  Contact: patient  Patient need orders for blood work, and questions please call back at [phone] (ofon)

## 2020-12-21 NOTE — TELEPHONE ENCOUNTER
Please note that pt is established with Dr Honeycutt, not Dr Duncan and messages should be sent to PCP

## 2021-01-06 ENCOUNTER — PATIENT MESSAGE (OUTPATIENT)
Dept: FAMILY MEDICINE | Facility: CLINIC | Age: 86
End: 2021-01-06

## 2021-01-07 ENCOUNTER — IMMUNIZATION (OUTPATIENT)
Dept: FAMILY MEDICINE | Facility: CLINIC | Age: 86
End: 2021-01-07
Payer: MEDICARE

## 2021-01-07 DIAGNOSIS — Z23 NEED FOR VACCINATION: ICD-10-CM

## 2021-01-07 PROCEDURE — 91300 COVID-19, MRNA, LNP-S, PF, 30 MCG/0.3 ML DOSE VACCINE: CPT | Mod: PBBFAC,PO

## 2021-01-28 ENCOUNTER — IMMUNIZATION (OUTPATIENT)
Dept: FAMILY MEDICINE | Facility: CLINIC | Age: 86
End: 2021-01-28
Payer: MEDICARE

## 2021-01-28 DIAGNOSIS — Z23 NEED FOR VACCINATION: Primary | ICD-10-CM

## 2021-01-28 PROCEDURE — 0002A COVID-19, MRNA, LNP-S, PF, 30 MCG/0.3 ML DOSE VACCINE: CPT | Mod: PBBFAC,PO

## 2021-01-28 PROCEDURE — 91300 COVID-19, MRNA, LNP-S, PF, 30 MCG/0.3 ML DOSE VACCINE: CPT | Mod: PBBFAC,PO

## 2021-03-08 ENCOUNTER — OFFICE VISIT (OUTPATIENT)
Dept: FAMILY MEDICINE | Facility: CLINIC | Age: 86
End: 2021-03-08
Payer: MEDICARE

## 2021-03-08 ENCOUNTER — LAB VISIT (OUTPATIENT)
Dept: LAB | Facility: HOSPITAL | Age: 86
End: 2021-03-08
Attending: INTERNAL MEDICINE
Payer: MEDICARE

## 2021-03-08 VITALS
HEART RATE: 79 BPM | TEMPERATURE: 98 F | BODY MASS INDEX: 25.77 KG/M2 | WEIGHT: 190.25 LBS | OXYGEN SATURATION: 98 % | RESPIRATION RATE: 18 BRPM | SYSTOLIC BLOOD PRESSURE: 138 MMHG | HEIGHT: 72 IN | DIASTOLIC BLOOD PRESSURE: 78 MMHG

## 2021-03-08 DIAGNOSIS — D64.9 NORMOCYTIC ANEMIA: ICD-10-CM

## 2021-03-08 DIAGNOSIS — E53.8 B12 DEFICIENCY: Primary | ICD-10-CM

## 2021-03-08 DIAGNOSIS — G62.9 PERIPHERAL POLYNEUROPATHY: ICD-10-CM

## 2021-03-08 DIAGNOSIS — E78.2 MIXED HYPERLIPIDEMIA: ICD-10-CM

## 2021-03-08 DIAGNOSIS — E53.8 B12 DEFICIENCY: ICD-10-CM

## 2021-03-08 DIAGNOSIS — R20.2 PARESTHESIA OF SKIN: ICD-10-CM

## 2021-03-08 LAB
ALBUMIN SERPL BCP-MCNC: 3.8 G/DL (ref 3.5–5.2)
ALP SERPL-CCNC: 69 U/L (ref 55–135)
ALT SERPL W/O P-5'-P-CCNC: 17 U/L (ref 10–44)
ANION GAP SERPL CALC-SCNC: 8 MMOL/L (ref 8–16)
AST SERPL-CCNC: 24 U/L (ref 10–40)
BILIRUB SERPL-MCNC: 0.6 MG/DL (ref 0.1–1)
BUN SERPL-MCNC: 18 MG/DL (ref 8–23)
CALCIUM SERPL-MCNC: 9.4 MG/DL (ref 8.7–10.5)
CHLORIDE SERPL-SCNC: 107 MMOL/L (ref 95–110)
CHOLEST SERPL-MCNC: 179 MG/DL (ref 120–199)
CHOLEST/HDLC SERPL: 2.5 {RATIO} (ref 2–5)
CO2 SERPL-SCNC: 27 MMOL/L (ref 23–29)
CREAT SERPL-MCNC: 1 MG/DL (ref 0.5–1.4)
ERYTHROCYTE [DISTWIDTH] IN BLOOD BY AUTOMATED COUNT: 15.3 % (ref 11.5–14.5)
EST. GFR  (AFRICAN AMERICAN): >60 ML/MIN/1.73 M^2
EST. GFR  (NON AFRICAN AMERICAN): >60 ML/MIN/1.73 M^2
GLUCOSE SERPL-MCNC: 83 MG/DL (ref 70–110)
HCT VFR BLD AUTO: 39.4 % (ref 40–54)
HDLC SERPL-MCNC: 73 MG/DL (ref 40–75)
HDLC SERPL: 40.8 % (ref 20–50)
HGB BLD-MCNC: 12.4 G/DL (ref 14–18)
LDLC SERPL CALC-MCNC: 86.4 MG/DL (ref 63–159)
MCH RBC QN AUTO: 28.6 PG (ref 27–31)
MCHC RBC AUTO-ENTMCNC: 31.5 G/DL (ref 32–36)
MCV RBC AUTO: 91 FL (ref 82–98)
NONHDLC SERPL-MCNC: 106 MG/DL
PLATELET # BLD AUTO: 206 K/UL (ref 150–350)
PMV BLD AUTO: 12.5 FL (ref 9.2–12.9)
POTASSIUM SERPL-SCNC: 4.6 MMOL/L (ref 3.5–5.1)
PROT SERPL-MCNC: 7.2 G/DL (ref 6–8.4)
RBC # BLD AUTO: 4.34 M/UL (ref 4.6–6.2)
SODIUM SERPL-SCNC: 142 MMOL/L (ref 136–145)
TRIGL SERPL-MCNC: 98 MG/DL (ref 30–150)
TSH SERPL DL<=0.005 MIU/L-ACNC: 1.27 UIU/ML (ref 0.4–4)
WBC # BLD AUTO: 6.09 K/UL (ref 3.9–12.7)

## 2021-03-08 PROCEDURE — 99999 PR PBB SHADOW E&M-EST. PATIENT-LVL IV: CPT | Mod: PBBFAC,,, | Performed by: INTERNAL MEDICINE

## 2021-03-08 PROCEDURE — 85027 COMPLETE CBC AUTOMATED: CPT | Performed by: INTERNAL MEDICINE

## 2021-03-08 PROCEDURE — 99214 PR OFFICE/OUTPT VISIT, EST, LEVL IV, 30-39 MIN: ICD-10-PCS | Mod: S$PBB,,, | Performed by: INTERNAL MEDICINE

## 2021-03-08 PROCEDURE — 82607 VITAMIN B-12: CPT | Performed by: INTERNAL MEDICINE

## 2021-03-08 PROCEDURE — 84165 PATHOLOGIST INTERPRETATION SPE: ICD-10-PCS | Mod: 26,,, | Performed by: PATHOLOGY

## 2021-03-08 PROCEDURE — 84165 PROTEIN E-PHORESIS SERUM: CPT | Performed by: INTERNAL MEDICINE

## 2021-03-08 PROCEDURE — 84165 PROTEIN E-PHORESIS SERUM: CPT | Mod: 26,,, | Performed by: PATHOLOGY

## 2021-03-08 PROCEDURE — 99999 PR PBB SHADOW E&M-EST. PATIENT-LVL IV: ICD-10-PCS | Mod: PBBFAC,,, | Performed by: INTERNAL MEDICINE

## 2021-03-08 PROCEDURE — 99214 OFFICE O/P EST MOD 30 MIN: CPT | Mod: PBBFAC,PO | Performed by: INTERNAL MEDICINE

## 2021-03-08 PROCEDURE — 84443 ASSAY THYROID STIM HORMONE: CPT | Performed by: INTERNAL MEDICINE

## 2021-03-08 PROCEDURE — 80061 LIPID PANEL: CPT | Performed by: INTERNAL MEDICINE

## 2021-03-08 PROCEDURE — 83921 ORGANIC ACID SINGLE QUANT: CPT | Performed by: INTERNAL MEDICINE

## 2021-03-08 PROCEDURE — 80053 COMPREHEN METABOLIC PANEL: CPT | Performed by: INTERNAL MEDICINE

## 2021-03-08 PROCEDURE — 99214 OFFICE O/P EST MOD 30 MIN: CPT | Mod: S$PBB,,, | Performed by: INTERNAL MEDICINE

## 2021-03-08 RX ORDER — PRAVASTATIN SODIUM 40 MG/1
TABLET ORAL
Qty: 90 TABLET | Refills: 3 | Status: SHIPPED | OUTPATIENT
Start: 2021-03-08 | End: 2022-03-24 | Stop reason: SDUPTHER

## 2021-03-08 RX ORDER — GABAPENTIN 300 MG/1
300 CAPSULE ORAL 2 TIMES DAILY
Qty: 180 CAPSULE | Refills: 3 | Status: SHIPPED | OUTPATIENT
Start: 2021-03-08 | End: 2022-03-24 | Stop reason: SDUPTHER

## 2021-03-09 LAB
ALBUMIN SERPL ELPH-MCNC: 4.14 G/DL (ref 3.35–5.55)
ALPHA1 GLOB SERPL ELPH-MCNC: 0.28 G/DL (ref 0.17–0.41)
ALPHA2 GLOB SERPL ELPH-MCNC: 0.92 G/DL (ref 0.43–0.99)
B-GLOBULIN SERPL ELPH-MCNC: 0.89 G/DL (ref 0.5–1.1)
GAMMA GLOB SERPL ELPH-MCNC: 0.87 G/DL (ref 0.67–1.58)
PROT SERPL-MCNC: 7.1 G/DL (ref 6–8.4)
VIT B12 SERPL-MCNC: 675 PG/ML (ref 210–950)

## 2021-03-10 LAB — PATHOLOGIST INTERPRETATION SPE: NORMAL

## 2021-03-12 ENCOUNTER — PATIENT MESSAGE (OUTPATIENT)
Dept: FAMILY MEDICINE | Facility: CLINIC | Age: 86
End: 2021-03-12

## 2021-03-12 LAB — METHYLMALONATE SERPL-SCNC: 0.62 UMOL/L

## 2021-03-12 RX ORDER — CYANOCOBALAMIN 1000 UG/ML
1000 INJECTION, SOLUTION INTRAMUSCULAR; SUBCUTANEOUS WEEKLY
Status: SHIPPED | OUTPATIENT
Start: 2021-03-12 | End: 2021-04-09

## 2021-03-12 RX ORDER — CYANOCOBALAMIN 1000 UG/ML
1000 INJECTION, SOLUTION INTRAMUSCULAR; SUBCUTANEOUS
Status: SHIPPED | OUTPATIENT
Start: 2021-03-12 | End: 2021-09-08

## 2021-03-15 ENCOUNTER — TELEPHONE (OUTPATIENT)
Dept: FAMILY MEDICINE | Facility: CLINIC | Age: 86
End: 2021-03-15

## 2021-09-10 ENCOUNTER — OFFICE VISIT (OUTPATIENT)
Dept: FAMILY MEDICINE | Facility: CLINIC | Age: 86
End: 2021-09-10
Payer: MEDICARE

## 2021-09-10 VITALS
TEMPERATURE: 98 F | WEIGHT: 189.13 LBS | HEART RATE: 66 BPM | BODY MASS INDEX: 25.62 KG/M2 | HEIGHT: 72 IN | OXYGEN SATURATION: 97 % | SYSTOLIC BLOOD PRESSURE: 120 MMHG | DIASTOLIC BLOOD PRESSURE: 68 MMHG

## 2021-09-10 DIAGNOSIS — E53.8 B12 DEFICIENCY: Primary | ICD-10-CM

## 2021-09-10 DIAGNOSIS — K21.9 GASTROESOPHAGEAL REFLUX DISEASE WITHOUT ESOPHAGITIS: ICD-10-CM

## 2021-09-10 DIAGNOSIS — G62.9 PERIPHERAL POLYNEUROPATHY: ICD-10-CM

## 2021-09-10 DIAGNOSIS — E78.2 MIXED HYPERLIPIDEMIA: ICD-10-CM

## 2021-09-10 PROCEDURE — 99213 OFFICE O/P EST LOW 20 MIN: CPT | Mod: PBBFAC,PO | Performed by: INTERNAL MEDICINE

## 2021-09-10 PROCEDURE — 99999 PR PBB SHADOW E&M-EST. PATIENT-LVL III: ICD-10-PCS | Mod: PBBFAC,,, | Performed by: INTERNAL MEDICINE

## 2021-09-10 PROCEDURE — 99214 PR OFFICE/OUTPT VISIT, EST, LEVL IV, 30-39 MIN: ICD-10-PCS | Mod: S$PBB,,, | Performed by: INTERNAL MEDICINE

## 2021-09-10 PROCEDURE — 99999 PR PBB SHADOW E&M-EST. PATIENT-LVL III: CPT | Mod: PBBFAC,,, | Performed by: INTERNAL MEDICINE

## 2021-09-10 PROCEDURE — 99214 OFFICE O/P EST MOD 30 MIN: CPT | Mod: S$PBB,,, | Performed by: INTERNAL MEDICINE

## 2021-09-10 RX ORDER — CYANOCOBALAMIN 1000 UG/ML
1000 INJECTION, SOLUTION INTRAMUSCULAR; SUBCUTANEOUS
Status: SHIPPED | OUTPATIENT
Start: 2021-09-10 | End: 2021-12-09

## 2021-09-10 RX ORDER — CYANOCOBALAMIN 1000 UG/ML
1000 INJECTION, SOLUTION INTRAMUSCULAR; SUBCUTANEOUS
Status: DISCONTINUED | OUTPATIENT
Start: 2021-09-10 | End: 2021-09-10

## 2021-10-11 ENCOUNTER — CLINICAL SUPPORT (OUTPATIENT)
Dept: FAMILY MEDICINE | Facility: CLINIC | Age: 86
End: 2021-10-11
Payer: MEDICARE

## 2021-10-11 VITALS — HEART RATE: 66 BPM | OXYGEN SATURATION: 97 %

## 2021-10-11 DIAGNOSIS — Z23 NEED FOR INFLUENZA VACCINATION: ICD-10-CM

## 2021-10-11 DIAGNOSIS — E53.8 B12 DEFICIENCY: Primary | ICD-10-CM

## 2021-10-11 PROCEDURE — 99211 OFF/OP EST MAY X REQ PHY/QHP: CPT | Mod: S$PBB,,, | Performed by: INTERNAL MEDICINE

## 2021-10-11 PROCEDURE — 99213 OFFICE O/P EST LOW 20 MIN: CPT | Mod: PBBFAC,PO,25

## 2021-10-11 PROCEDURE — 96372 THER/PROPH/DIAG INJ SC/IM: CPT | Mod: PBBFAC,59,PO

## 2021-10-11 PROCEDURE — 99999 PR PBB SHADOW E&M-EST. PATIENT-LVL III: CPT | Mod: PBBFAC,,,

## 2021-10-11 PROCEDURE — 99999 PR PBB SHADOW E&M-EST. PATIENT-LVL III: ICD-10-PCS | Mod: PBBFAC,,,

## 2021-10-11 PROCEDURE — G0008 ADMIN INFLUENZA VIRUS VAC: HCPCS | Mod: PBBFAC,PO

## 2021-10-11 PROCEDURE — 99211 PR OFFICE/OUTPT VISIT, EST, LEVL I: ICD-10-PCS | Mod: S$PBB,,, | Performed by: INTERNAL MEDICINE

## 2021-10-11 PROCEDURE — 90694 VACC AIIV4 NO PRSRV 0.5ML IM: CPT | Mod: PBBFAC,PO

## 2021-10-11 RX ADMIN — CYANOCOBALAMIN 1000 MCG: 1000 INJECTION, SOLUTION INTRAMUSCULAR at 11:10

## 2021-10-28 ENCOUNTER — IMMUNIZATION (OUTPATIENT)
Dept: FAMILY MEDICINE | Facility: CLINIC | Age: 86
End: 2021-10-28
Payer: MEDICARE

## 2021-10-28 DIAGNOSIS — Z23 NEED FOR VACCINATION: Primary | ICD-10-CM

## 2021-10-28 PROCEDURE — 0003A COVID-19, MRNA, LNP-S, PF, 30 MCG/0.3 ML DOSE VACCINE: CPT | Mod: PBBFAC | Performed by: INTERNAL MEDICINE

## 2021-10-28 PROCEDURE — 91300 COVID-19, MRNA, LNP-S, PF, 30 MCG/0.3 ML DOSE VACCINE: CPT | Mod: PBBFAC,PO

## 2021-11-11 ENCOUNTER — CLINICAL SUPPORT (OUTPATIENT)
Dept: FAMILY MEDICINE | Facility: CLINIC | Age: 86
End: 2021-11-11
Payer: MEDICARE

## 2021-11-11 DIAGNOSIS — E53.8 B12 DEFICIENCY: ICD-10-CM

## 2021-11-11 PROCEDURE — 96372 THER/PROPH/DIAG INJ SC/IM: CPT | Mod: PBBFAC,PO

## 2021-11-11 PROCEDURE — 99211 PR OFFICE/OUTPT VISIT, EST, LEVL I: ICD-10-PCS | Mod: S$PBB,,, | Performed by: INTERNAL MEDICINE

## 2021-11-11 PROCEDURE — 99211 OFF/OP EST MAY X REQ PHY/QHP: CPT | Mod: S$PBB,,, | Performed by: INTERNAL MEDICINE

## 2021-11-11 RX ADMIN — CYANOCOBALAMIN 1000 MCG: 1000 INJECTION, SOLUTION INTRAMUSCULAR at 09:11

## 2021-12-13 ENCOUNTER — CLINICAL SUPPORT (OUTPATIENT)
Dept: FAMILY MEDICINE | Facility: CLINIC | Age: 86
End: 2021-12-13
Payer: MEDICARE

## 2021-12-13 ENCOUNTER — TELEPHONE (OUTPATIENT)
Dept: FAMILY MEDICINE | Facility: CLINIC | Age: 86
End: 2021-12-13
Payer: MEDICARE

## 2021-12-13 VITALS — RESPIRATION RATE: 16 BRPM

## 2021-12-13 DIAGNOSIS — E53.8 B12 DEFICIENCY: ICD-10-CM

## 2021-12-13 DIAGNOSIS — E53.8 B12 DEFICIENCY: Primary | ICD-10-CM

## 2021-12-13 PROCEDURE — 99499 UNLISTED E&M SERVICE: CPT | Mod: S$PBB,,, | Performed by: INTERNAL MEDICINE

## 2021-12-13 PROCEDURE — 96372 THER/PROPH/DIAG INJ SC/IM: CPT | Mod: PBBFAC,PO

## 2021-12-13 PROCEDURE — 99211 OFF/OP EST MAY X REQ PHY/QHP: CPT | Mod: PBBFAC,PO

## 2021-12-13 PROCEDURE — 99999 PR PBB SHADOW E&M-EST. PATIENT-LVL I: CPT | Mod: PBBFAC,,,

## 2021-12-13 PROCEDURE — 99499 NO LOS: ICD-10-PCS | Mod: S$PBB,,, | Performed by: INTERNAL MEDICINE

## 2021-12-13 PROCEDURE — 99999 PR PBB SHADOW E&M-EST. PATIENT-LVL I: ICD-10-PCS | Mod: PBBFAC,,,

## 2021-12-13 RX ORDER — CYANOCOBALAMIN 1000 UG/ML
1000 INJECTION, SOLUTION INTRAMUSCULAR; SUBCUTANEOUS
Status: SHIPPED | OUTPATIENT
Start: 2021-12-13

## 2021-12-13 RX ADMIN — CYANOCOBALAMIN 1000 MCG: 1000 INJECTION, SOLUTION INTRAMUSCULAR at 09:12

## 2022-01-12 ENCOUNTER — CLINICAL SUPPORT (OUTPATIENT)
Dept: FAMILY MEDICINE | Facility: CLINIC | Age: 87
End: 2022-01-12
Payer: MEDICARE

## 2022-01-12 DIAGNOSIS — E53.8 B12 DEFICIENCY: Primary | ICD-10-CM

## 2022-01-12 PROCEDURE — 99211 PR OFFICE/OUTPT VISIT, EST, LEVL I: ICD-10-PCS | Mod: S$PBB,,, | Performed by: INTERNAL MEDICINE

## 2022-01-12 PROCEDURE — 96372 THER/PROPH/DIAG INJ SC/IM: CPT | Mod: PBBFAC,PO

## 2022-01-12 PROCEDURE — 99211 OFF/OP EST MAY X REQ PHY/QHP: CPT | Mod: S$PBB,,, | Performed by: INTERNAL MEDICINE

## 2022-01-12 RX ADMIN — CYANOCOBALAMIN 1000 MCG: 1000 INJECTION, SOLUTION INTRAMUSCULAR at 09:01

## 2022-02-11 ENCOUNTER — CLINICAL SUPPORT (OUTPATIENT)
Dept: FAMILY MEDICINE | Facility: CLINIC | Age: 87
End: 2022-02-11
Payer: MEDICARE

## 2022-02-11 VITALS — HEART RATE: 78 BPM

## 2022-02-11 DIAGNOSIS — E53.8 B12 DEFICIENCY: Primary | ICD-10-CM

## 2022-02-11 PROCEDURE — 99212 OFFICE O/P EST SF 10 MIN: CPT | Mod: PBBFAC,PO

## 2022-02-11 PROCEDURE — 99999 PR PBB SHADOW E&M-EST. PATIENT-LVL II: ICD-10-PCS | Mod: PBBFAC,,,

## 2022-02-11 PROCEDURE — 99999 PR PBB SHADOW E&M-EST. PATIENT-LVL II: CPT | Mod: PBBFAC,,,

## 2022-02-11 PROCEDURE — 96372 THER/PROPH/DIAG INJ SC/IM: CPT | Mod: PBBFAC,PO

## 2022-02-11 PROCEDURE — 99211 PR OFFICE/OUTPT VISIT, EST, LEVL I: ICD-10-PCS | Mod: S$PBB,,, | Performed by: INTERNAL MEDICINE

## 2022-02-11 PROCEDURE — 99211 OFF/OP EST MAY X REQ PHY/QHP: CPT | Mod: S$PBB,,, | Performed by: INTERNAL MEDICINE

## 2022-02-11 RX ADMIN — CYANOCOBALAMIN 1000 MCG: 1000 INJECTION, SOLUTION INTRAMUSCULAR at 09:02

## 2022-02-11 NOTE — PROGRESS NOTES
After obtaining consent, and per orders of Dr. Honeycutt, injection of cyanocobalamin 1000mcg given by Ester Sol. Patient instructed to remain in clinic for 15 minutes afterwards, and to report any adverse reaction to me immediately.

## 2022-02-24 ENCOUNTER — PES CALL (OUTPATIENT)
Dept: ADMINISTRATIVE | Facility: CLINIC | Age: 87
End: 2022-02-24
Payer: MEDICARE

## 2022-03-11 ENCOUNTER — CLINICAL SUPPORT (OUTPATIENT)
Dept: FAMILY MEDICINE | Facility: CLINIC | Age: 87
End: 2022-03-11
Payer: MEDICARE

## 2022-03-11 VITALS — OXYGEN SATURATION: 97 % | HEART RATE: 66 BPM

## 2022-03-11 DIAGNOSIS — E53.8 B12 DEFICIENCY: Primary | ICD-10-CM

## 2022-03-11 PROCEDURE — 99211 PR OFFICE/OUTPT VISIT, EST, LEVL I: ICD-10-PCS | Mod: S$PBB,,, | Performed by: INTERNAL MEDICINE

## 2022-03-11 PROCEDURE — 99211 OFF/OP EST MAY X REQ PHY/QHP: CPT | Mod: S$PBB,,, | Performed by: INTERNAL MEDICINE

## 2022-03-11 PROCEDURE — 99999 PR PBB SHADOW E&M-EST. PATIENT-LVL III: ICD-10-PCS | Mod: PBBFAC,,,

## 2022-03-11 PROCEDURE — 96372 THER/PROPH/DIAG INJ SC/IM: CPT | Mod: PBBFAC,PO

## 2022-03-11 PROCEDURE — 99213 OFFICE O/P EST LOW 20 MIN: CPT | Mod: PBBFAC,25,PO

## 2022-03-11 PROCEDURE — 99999 PR PBB SHADOW E&M-EST. PATIENT-LVL III: CPT | Mod: PBBFAC,,,

## 2022-03-11 RX ADMIN — CYANOCOBALAMIN 1000 MCG: 1000 INJECTION, SOLUTION INTRAMUSCULAR at 10:03

## 2022-03-11 NOTE — PROGRESS NOTES
B-12 injection given to Right upper outer quad gluteus.2 patient identifers verified . med and dose verified by Ester Sol LPN. patient tolerated well and ambulated out of the exam room.

## 2022-03-24 ENCOUNTER — OFFICE VISIT (OUTPATIENT)
Dept: FAMILY MEDICINE | Facility: CLINIC | Age: 87
End: 2022-03-24
Payer: MEDICARE

## 2022-03-24 ENCOUNTER — LAB VISIT (OUTPATIENT)
Dept: LAB | Facility: HOSPITAL | Age: 87
End: 2022-03-24
Attending: INTERNAL MEDICINE
Payer: MEDICARE

## 2022-03-24 VITALS
OXYGEN SATURATION: 96 % | BODY MASS INDEX: 25.68 KG/M2 | DIASTOLIC BLOOD PRESSURE: 82 MMHG | HEART RATE: 76 BPM | HEIGHT: 72 IN | WEIGHT: 189.63 LBS | SYSTOLIC BLOOD PRESSURE: 126 MMHG

## 2022-03-24 DIAGNOSIS — Z12.5 PROSTATE CANCER SCREENING: ICD-10-CM

## 2022-03-24 DIAGNOSIS — E53.8 B12 DEFICIENCY: ICD-10-CM

## 2022-03-24 DIAGNOSIS — K21.9 GASTROESOPHAGEAL REFLUX DISEASE WITHOUT ESOPHAGITIS: ICD-10-CM

## 2022-03-24 DIAGNOSIS — E78.2 MIXED HYPERLIPIDEMIA: Primary | ICD-10-CM

## 2022-03-24 DIAGNOSIS — E78.2 MIXED HYPERLIPIDEMIA: ICD-10-CM

## 2022-03-24 DIAGNOSIS — G62.9 PERIPHERAL POLYNEUROPATHY: ICD-10-CM

## 2022-03-24 LAB
ALBUMIN SERPL BCP-MCNC: 3.7 G/DL (ref 3.5–5.2)
ALP SERPL-CCNC: 61 U/L (ref 55–135)
ALT SERPL W/O P-5'-P-CCNC: 18 U/L (ref 10–44)
ANION GAP SERPL CALC-SCNC: 7 MMOL/L (ref 8–16)
AST SERPL-CCNC: 31 U/L (ref 10–40)
BILIRUB SERPL-MCNC: 0.5 MG/DL (ref 0.1–1)
BUN SERPL-MCNC: 12 MG/DL (ref 8–23)
CALCIUM SERPL-MCNC: 9.8 MG/DL (ref 8.7–10.5)
CHLORIDE SERPL-SCNC: 107 MMOL/L (ref 95–110)
CHOLEST SERPL-MCNC: 177 MG/DL (ref 120–199)
CHOLEST/HDLC SERPL: 2.9 {RATIO} (ref 2–5)
CO2 SERPL-SCNC: 28 MMOL/L (ref 23–29)
COMPLEXED PSA SERPL-MCNC: 4.2 NG/ML (ref 0–4)
CREAT SERPL-MCNC: 0.9 MG/DL (ref 0.5–1.4)
ERYTHROCYTE [DISTWIDTH] IN BLOOD BY AUTOMATED COUNT: 14.9 % (ref 11.5–14.5)
EST. GFR  (AFRICAN AMERICAN): >60 ML/MIN/1.73 M^2
EST. GFR  (NON AFRICAN AMERICAN): >60 ML/MIN/1.73 M^2
GLUCOSE SERPL-MCNC: 82 MG/DL (ref 70–110)
HCT VFR BLD AUTO: 40.2 % (ref 40–54)
HDLC SERPL-MCNC: 61 MG/DL (ref 40–75)
HDLC SERPL: 34.5 % (ref 20–50)
HGB BLD-MCNC: 12.7 G/DL (ref 14–18)
LDLC SERPL CALC-MCNC: 82.2 MG/DL (ref 63–159)
MCH RBC QN AUTO: 28.9 PG (ref 27–31)
MCHC RBC AUTO-ENTMCNC: 31.6 G/DL (ref 32–36)
MCV RBC AUTO: 92 FL (ref 82–98)
NONHDLC SERPL-MCNC: 116 MG/DL
PLATELET # BLD AUTO: 191 K/UL (ref 150–450)
PMV BLD AUTO: 12.2 FL (ref 9.2–12.9)
POTASSIUM SERPL-SCNC: 4.9 MMOL/L (ref 3.5–5.1)
PROT SERPL-MCNC: 7.3 G/DL (ref 6–8.4)
RBC # BLD AUTO: 4.39 M/UL (ref 4.6–6.2)
SODIUM SERPL-SCNC: 142 MMOL/L (ref 136–145)
TRIGL SERPL-MCNC: 169 MG/DL (ref 30–150)
VIT B12 SERPL-MCNC: 1348 PG/ML (ref 210–950)
WBC # BLD AUTO: 5.85 K/UL (ref 3.9–12.7)

## 2022-03-24 PROCEDURE — 80053 COMPREHEN METABOLIC PANEL: CPT | Performed by: INTERNAL MEDICINE

## 2022-03-24 PROCEDURE — 36415 COLL VENOUS BLD VENIPUNCTURE: CPT | Mod: PO | Performed by: INTERNAL MEDICINE

## 2022-03-24 PROCEDURE — 82607 VITAMIN B-12: CPT | Performed by: INTERNAL MEDICINE

## 2022-03-24 PROCEDURE — 84153 ASSAY OF PSA TOTAL: CPT | Performed by: INTERNAL MEDICINE

## 2022-03-24 PROCEDURE — 99214 OFFICE O/P EST MOD 30 MIN: CPT | Mod: S$PBB,,, | Performed by: INTERNAL MEDICINE

## 2022-03-24 PROCEDURE — 99214 PR OFFICE/OUTPT VISIT, EST, LEVL IV, 30-39 MIN: ICD-10-PCS | Mod: S$PBB,,, | Performed by: INTERNAL MEDICINE

## 2022-03-24 PROCEDURE — 80061 LIPID PANEL: CPT | Performed by: INTERNAL MEDICINE

## 2022-03-24 PROCEDURE — 83921 ORGANIC ACID SINGLE QUANT: CPT | Performed by: INTERNAL MEDICINE

## 2022-03-24 PROCEDURE — 99999 PR PBB SHADOW E&M-EST. PATIENT-LVL III: CPT | Mod: PBBFAC,,, | Performed by: INTERNAL MEDICINE

## 2022-03-24 PROCEDURE — 99999 PR PBB SHADOW E&M-EST. PATIENT-LVL III: ICD-10-PCS | Mod: PBBFAC,,, | Performed by: INTERNAL MEDICINE

## 2022-03-24 PROCEDURE — 99213 OFFICE O/P EST LOW 20 MIN: CPT | Mod: PBBFAC,PO | Performed by: INTERNAL MEDICINE

## 2022-03-24 PROCEDURE — 85027 COMPLETE CBC AUTOMATED: CPT | Performed by: INTERNAL MEDICINE

## 2022-03-24 RX ORDER — PRAVASTATIN SODIUM 40 MG/1
TABLET ORAL
Qty: 90 TABLET | Refills: 3 | Status: SHIPPED | OUTPATIENT
Start: 2022-03-24 | End: 2022-05-20

## 2022-03-24 RX ORDER — GABAPENTIN 300 MG/1
300 CAPSULE ORAL 2 TIMES DAILY
Qty: 180 CAPSULE | Refills: 3 | Status: SHIPPED | OUTPATIENT
Start: 2022-03-24 | End: 2022-06-16

## 2022-03-24 NOTE — PROGRESS NOTES
Subjective     Zara Gonzáles is a 88 y.o. old, male here for Follow-up    Patient is here for follow-up on chronic medical problems    89 y/o with PMH of HLD, peripheral neuropathy, B12 def    HLD: Continues statin therapy, due for labs.  Neuropathy: Stable, on B12 injections.    He has had mild URI symptoms and cough for the past few days. No fever or other symptoms.    He requests PSA screening.    Review of Systems   Constitutional: Negative for malaise/fatigue and weight loss.   HENT: Negative.    Respiratory: Negative for cough and shortness of breath.    Cardiovascular: Negative for chest pain and palpitations.     Medications     Outpatient Medications Marked as Taking for the 3/24/22 encounter (Office Visit) with Judd Honeycutt MD   Medication Sig Dispense Refill    multivit-min/FA/lycopen/lutein (CENTRUM SILVER MEN ORAL) Take by mouth.      [DISCONTINUED] gabapentin (NEURONTIN) 300 MG capsule Take 1 capsule (300 mg total) by mouth 2 (two) times a day. 180 capsule 3    [DISCONTINUED] pravastatin (PRAVACHOL) 40 MG tablet TAKE 1 TABLET(40 MG) BY MOUTH EVERY DAY 90 tablet 3     Current Facility-Administered Medications for the 3/24/22 encounter (Office Visit) with Judd Honeycutt MD   Medication Dose Route Frequency Provider Last Rate Last Admin    cyanocobalamin injection 1,000 mcg  1,000 mcg Intramuscular Q30 Days Abdirahman Arrington MD   1,000 mcg at 03/11/22 1005     Objective     /82   Pulse 76   Ht 6' (1.829 m)   Wt 86 kg (189 lb 9.5 oz)   SpO2 96%   BMI 25.71 kg/m²   Physical Exam  Constitutional:       General: He is not in acute distress.     Appearance: Normal appearance. He is well-developed.   HENT:      Head: Normocephalic and atraumatic.   Eyes:      Conjunctiva/sclera: Conjunctivae normal.      Pupils: Pupils are equal, round, and reactive to light.   Neck:      Thyroid: No thyroid mass or thyromegaly.   Cardiovascular:      Rate and Rhythm: Normal rate and regular  rhythm.      Heart sounds: Murmur heard.    Systolic murmur is present with a grade of 1/6.  Pulmonary:      Effort: No respiratory distress.      Breath sounds: Normal breath sounds.   Chest:   Breasts:      Right: No supraclavicular adenopathy.      Left: No supraclavicular adenopathy.       Abdominal:      General: Bowel sounds are normal.      Palpations: Abdomen is soft.      Tenderness: There is no abdominal tenderness.   Musculoskeletal:         General: No deformity.      Cervical back: Neck supple.   Lymphadenopathy:      Cervical: No cervical adenopathy.      Upper Body:      Right upper body: No supraclavicular adenopathy.      Left upper body: No supraclavicular adenopathy.   Skin:     General: Skin is warm and dry.      Findings: No rash.   Neurological:      Mental Status: He is alert and oriented to person, place, and time.   Psychiatric:         Behavior: Behavior normal.       Assessment and Plan     Mixed hyperlipidemia  -     pravastatin (PRAVACHOL) 40 MG tablet; TAKE 1 TABLET(40 MG) BY MOUTH EVERY DAY  Dispense: 90 tablet; Refill: 3  -     Comprehensive Metabolic Panel; Future; Expected date: 03/24/2022  -     Lipid Panel; Future; Expected date: 03/24/2022    B12 deficiency  -     CBC Without Differential; Future; Expected date: 03/24/2022  -     Vitamin B12; Future; Expected date: 03/24/2022  -     Methylmalonic Acid, Serum; Future; Expected date: 03/24/2022    Gastroesophageal reflux disease without esophagitis    Peripheral polyneuropathy  -     gabapentin (NEURONTIN) 300 MG capsule; Take 1 capsule (300 mg total) by mouth 2 (two) times a day.  Dispense: 180 capsule; Refill: 3    Prostate cancer screening  -     PSA, Screening; Future; Expected date: 03/24/2022    Chronic medical problems stable.  Continue B12 and MTV.    Follow up in about 6 months (around 9/24/2022).  ___________________  Judd Honeycutt MD  Internal Medicine and Pediatrics

## 2022-03-30 LAB — METHYLMALONATE SERPL-SCNC: 0.27 UMOL/L

## 2022-04-08 ENCOUNTER — CLINICAL SUPPORT (OUTPATIENT)
Dept: FAMILY MEDICINE | Facility: CLINIC | Age: 87
End: 2022-04-08
Payer: MEDICARE

## 2022-04-08 PROCEDURE — 96372 THER/PROPH/DIAG INJ SC/IM: CPT | Mod: PBBFAC,PO

## 2022-04-08 PROCEDURE — 99499 NO LOS: ICD-10-PCS | Mod: S$PBB,,, | Performed by: INTERNAL MEDICINE

## 2022-04-08 PROCEDURE — 99999 PR PBB SHADOW E&M-EST. PATIENT-LVL I: ICD-10-PCS | Mod: PBBFAC,,,

## 2022-04-08 PROCEDURE — 99499 UNLISTED E&M SERVICE: CPT | Mod: S$PBB,,, | Performed by: INTERNAL MEDICINE

## 2022-04-08 PROCEDURE — 99999 PR PBB SHADOW E&M-EST. PATIENT-LVL I: CPT | Mod: PBBFAC,,,

## 2022-04-08 PROCEDURE — 99211 OFF/OP EST MAY X REQ PHY/QHP: CPT | Mod: PBBFAC,PO

## 2022-04-08 RX ADMIN — CYANOCOBALAMIN 1000 MCG: 1000 INJECTION, SOLUTION INTRAMUSCULAR at 09:04

## 2022-04-26 ENCOUNTER — PES CALL (OUTPATIENT)
Dept: ADMINISTRATIVE | Facility: CLINIC | Age: 87
End: 2022-04-26
Payer: MEDICARE

## 2022-05-09 ENCOUNTER — CLINICAL SUPPORT (OUTPATIENT)
Dept: FAMILY MEDICINE | Facility: CLINIC | Age: 87
End: 2022-05-09
Payer: MEDICARE

## 2022-05-09 ENCOUNTER — PATIENT MESSAGE (OUTPATIENT)
Dept: SMOKING CESSATION | Facility: CLINIC | Age: 87
End: 2022-05-09
Payer: MEDICARE

## 2022-05-09 VITALS — TEMPERATURE: 98 F

## 2022-05-09 DIAGNOSIS — E53.8 B12 DEFICIENCY: Primary | ICD-10-CM

## 2022-05-09 PROCEDURE — 99211 OFF/OP EST MAY X REQ PHY/QHP: CPT | Mod: PBBFAC,PO

## 2022-05-09 PROCEDURE — 96372 THER/PROPH/DIAG INJ SC/IM: CPT | Mod: PBBFAC,PO

## 2022-05-09 PROCEDURE — 99211 PR OFFICE/OUTPT VISIT, EST, LEVL I: ICD-10-PCS | Mod: S$PBB,,, | Performed by: INTERNAL MEDICINE

## 2022-05-09 PROCEDURE — 99999 PR PBB SHADOW E&M-EST. PATIENT-LVL I: CPT | Mod: PBBFAC,,,

## 2022-05-09 PROCEDURE — 99999 PR PBB SHADOW E&M-EST. PATIENT-LVL I: ICD-10-PCS | Mod: PBBFAC,,,

## 2022-05-09 PROCEDURE — 99211 OFF/OP EST MAY X REQ PHY/QHP: CPT | Mod: S$PBB,,, | Performed by: INTERNAL MEDICINE

## 2022-05-09 RX ADMIN — CYANOCOBALAMIN 1000 MCG: 1000 INJECTION, SOLUTION INTRAMUSCULAR at 09:05

## 2022-05-09 NOTE — PROGRESS NOTES
After obtaining consent, and per orders of Dr. Honeycutt, injection of Cyanocobalamin 1,000 mcg given in right dorsalgluteal  by Ofe Chung. Patient instructed to remain in clinic for 15 minutes afterwards, and to report any adverse reaction to me immediately.

## 2022-05-20 DIAGNOSIS — E78.2 MIXED HYPERLIPIDEMIA: ICD-10-CM

## 2022-05-20 RX ORDER — PRAVASTATIN SODIUM 40 MG/1
TABLET ORAL
Qty: 90 TABLET | Refills: 3 | Status: ON HOLD | OUTPATIENT
Start: 2022-05-20 | End: 2023-04-04 | Stop reason: SDUPTHER

## 2022-05-20 NOTE — TELEPHONE ENCOUNTER
No new care gaps identified.  [facility] Embedded Care Gaps. Reference number: [id number]. 5/20/2022   11:19:13 AM ALEJANDRINAT

## 2022-05-20 NOTE — TELEPHONE ENCOUNTER
Refill Authorization Note   Zara Gonzáles  is requesting a refill authorization.  Brief Assessment and Rationale for Refill:  Approve     Medication Therapy Plan:       Medication Reconciliation Completed: No   Comments:     No Care Gaps recommended.     Note composed:2:54 PM 05/20/2022

## 2022-06-07 ENCOUNTER — CLINICAL SUPPORT (OUTPATIENT)
Dept: FAMILY MEDICINE | Facility: CLINIC | Age: 87
End: 2022-06-07
Payer: MEDICARE

## 2022-06-07 VITALS — RESPIRATION RATE: 16 BRPM

## 2022-06-07 DIAGNOSIS — E53.8 B12 DEFICIENCY: Primary | ICD-10-CM

## 2022-06-07 PROCEDURE — 99999 PR PBB SHADOW E&M-EST. PATIENT-LVL I: CPT | Mod: PBBFAC,,,

## 2022-06-07 PROCEDURE — 99211 OFF/OP EST MAY X REQ PHY/QHP: CPT | Mod: PBBFAC,PO

## 2022-06-07 PROCEDURE — 99211 PR OFFICE/OUTPT VISIT, EST, LEVL I: ICD-10-PCS | Mod: S$PBB,,, | Performed by: INTERNAL MEDICINE

## 2022-06-07 PROCEDURE — 96372 THER/PROPH/DIAG INJ SC/IM: CPT | Mod: PBBFAC,PO

## 2022-06-07 PROCEDURE — 99999 PR PBB SHADOW E&M-EST. PATIENT-LVL I: ICD-10-PCS | Mod: PBBFAC,,,

## 2022-06-07 PROCEDURE — 99211 OFF/OP EST MAY X REQ PHY/QHP: CPT | Mod: S$PBB,,, | Performed by: INTERNAL MEDICINE

## 2022-06-07 RX ADMIN — CYANOCOBALAMIN 1000 MCG: 1000 INJECTION, SOLUTION INTRAMUSCULAR at 09:06

## 2022-06-07 NOTE — PROGRESS NOTES
After obtaining consent, and per orders of Dr. Honeycutt, injection of b12 given in Left Upper Outer Quad Gluteus by Rosina Lagunas. Patient instructed to remain in clinic for 15 minutes afterwards, and to report any adverse reaction to me immediately.

## 2022-07-07 ENCOUNTER — TELEPHONE (OUTPATIENT)
Dept: FAMILY MEDICINE | Facility: CLINIC | Age: 87
End: 2022-07-07
Payer: MEDICARE

## 2022-07-07 NOTE — TELEPHONE ENCOUNTER
----- Message from Anali Mcmullen sent at 7/7/2022  3:50 PM CDT -----  Contact: self  Patient forgot his B12 inj appt this morning and needs a call back to get that tiffanie at [phone] (home) and thanks

## 2022-07-08 ENCOUNTER — CLINICAL SUPPORT (OUTPATIENT)
Dept: FAMILY MEDICINE | Facility: CLINIC | Age: 87
End: 2022-07-08
Payer: MEDICARE

## 2022-07-08 VITALS — HEART RATE: 72 BPM

## 2022-07-08 PROCEDURE — 99211 PR OFFICE/OUTPT VISIT, EST, LEVL I: ICD-10-PCS | Mod: S$PBB,,, | Performed by: INTERNAL MEDICINE

## 2022-07-08 PROCEDURE — 99211 OFF/OP EST MAY X REQ PHY/QHP: CPT | Mod: S$PBB,,, | Performed by: INTERNAL MEDICINE

## 2022-07-08 PROCEDURE — 96372 THER/PROPH/DIAG INJ SC/IM: CPT | Mod: PBBFAC,PO

## 2022-07-08 RX ADMIN — CYANOCOBALAMIN 1000 MCG: 1000 INJECTION, SOLUTION INTRAMUSCULAR at 09:07

## 2022-07-08 NOTE — PROGRESS NOTES
After obtaining consent, and per orders of Dr. Honeycutt, injection of cyanocobalamin 1,000 mcg given by Ester Sol. Patient instructed to remain in clinic for 15 minutes afterwards, and to report any adverse reaction to me immediately.

## 2022-07-25 NOTE — PATIENT INSTRUCTIONS
Patient instructed to remain in clinic for 15 minutes afterwards, and to report any adverse reaction to me immediately.  
Warm

## 2022-08-08 ENCOUNTER — CLINICAL SUPPORT (OUTPATIENT)
Dept: FAMILY MEDICINE | Facility: CLINIC | Age: 87
End: 2022-08-08
Payer: MEDICARE

## 2022-08-08 VITALS — HEART RATE: 76 BPM | OXYGEN SATURATION: 96 %

## 2022-08-08 DIAGNOSIS — E53.8 B12 DEFICIENCY: Primary | ICD-10-CM

## 2022-08-08 PROCEDURE — 96372 THER/PROPH/DIAG INJ SC/IM: CPT | Mod: PBBFAC,PO

## 2022-08-08 PROCEDURE — 99212 OFFICE O/P EST SF 10 MIN: CPT | Mod: PBBFAC,PO

## 2022-08-08 PROCEDURE — 99211 OFF/OP EST MAY X REQ PHY/QHP: CPT | Mod: S$PBB,,, | Performed by: INTERNAL MEDICINE

## 2022-08-08 PROCEDURE — 99999 PR PBB SHADOW E&M-EST. PATIENT-LVL II: CPT | Mod: PBBFAC,,,

## 2022-08-08 PROCEDURE — 99999 PR PBB SHADOW E&M-EST. PATIENT-LVL II: ICD-10-PCS | Mod: PBBFAC,,,

## 2022-08-08 PROCEDURE — 99211 PR OFFICE/OUTPT VISIT, EST, LEVL I: ICD-10-PCS | Mod: S$PBB,,, | Performed by: INTERNAL MEDICINE

## 2022-08-08 RX ADMIN — CYANOCOBALAMIN 1000 MCG: 1000 INJECTION, SOLUTION INTRAMUSCULAR at 09:08

## 2022-08-08 NOTE — PROGRESS NOTES
B-12 injection given to Left Dorsalgluteal. 2 patient identifers verified. Med and dose verified by Beatriz Frausto LPN. patient tolerated well and ambulated out of exam room.

## 2022-09-08 ENCOUNTER — CLINICAL SUPPORT (OUTPATIENT)
Dept: FAMILY MEDICINE | Facility: CLINIC | Age: 87
End: 2022-09-08
Payer: MEDICARE

## 2022-09-08 DIAGNOSIS — E53.8 B12 DEFICIENCY: Primary | ICD-10-CM

## 2022-09-08 PROCEDURE — 96372 THER/PROPH/DIAG INJ SC/IM: CPT | Mod: PBBFAC,PO

## 2022-09-08 RX ADMIN — CYANOCOBALAMIN 1000 MCG: 1000 INJECTION, SOLUTION INTRAMUSCULAR at 09:09

## 2022-10-10 ENCOUNTER — CLINICAL SUPPORT (OUTPATIENT)
Dept: FAMILY MEDICINE | Facility: CLINIC | Age: 87
End: 2022-10-10
Payer: MEDICARE

## 2022-10-10 DIAGNOSIS — E53.8 B12 DEFICIENCY: Primary | ICD-10-CM

## 2022-10-10 PROCEDURE — 96372 THER/PROPH/DIAG INJ SC/IM: CPT | Mod: PBBFAC,PO

## 2022-10-10 PROCEDURE — 99211 OFF/OP EST MAY X REQ PHY/QHP: CPT | Mod: S$PBB,,, | Performed by: INTERNAL MEDICINE

## 2022-10-10 PROCEDURE — 99211 PR OFFICE/OUTPT VISIT, EST, LEVL I: ICD-10-PCS | Mod: S$PBB,,, | Performed by: INTERNAL MEDICINE

## 2022-10-10 RX ADMIN — CYANOCOBALAMIN 1000 MCG: 1000 INJECTION, SOLUTION INTRAMUSCULAR at 10:10

## 2022-10-10 NOTE — PROGRESS NOTES
After obtaining consent, and per orders of Dr. Honeycutt, injection of B-12 given by Maira Lorenzo in left upper outer gluteus muscle. Patient instructed to report any adverse reaction to me immediately.

## 2022-10-17 ENCOUNTER — OFFICE VISIT (OUTPATIENT)
Dept: FAMILY MEDICINE | Facility: CLINIC | Age: 87
End: 2022-10-17
Payer: MEDICARE

## 2022-10-17 VITALS
HEIGHT: 72 IN | BODY MASS INDEX: 25.2 KG/M2 | WEIGHT: 186.06 LBS | SYSTOLIC BLOOD PRESSURE: 126 MMHG | OXYGEN SATURATION: 98 % | DIASTOLIC BLOOD PRESSURE: 62 MMHG | HEART RATE: 72 BPM

## 2022-10-17 DIAGNOSIS — D64.9 NORMOCYTIC ANEMIA: ICD-10-CM

## 2022-10-17 DIAGNOSIS — Z23 NEED FOR VACCINATION: ICD-10-CM

## 2022-10-17 DIAGNOSIS — E78.2 MIXED HYPERLIPIDEMIA: Primary | ICD-10-CM

## 2022-10-17 DIAGNOSIS — K21.9 GASTROESOPHAGEAL REFLUX DISEASE WITHOUT ESOPHAGITIS: ICD-10-CM

## 2022-10-17 DIAGNOSIS — E53.8 B12 DEFICIENCY: ICD-10-CM

## 2022-10-17 PROCEDURE — 99214 OFFICE O/P EST MOD 30 MIN: CPT | Mod: S$PBB,,, | Performed by: INTERNAL MEDICINE

## 2022-10-17 PROCEDURE — 91312 COVID-19, MRNA, LNP-S, BIVALENT BOOSTER, PF, 30 MCG/0.3 ML DOSE: CPT | Mod: PBBFAC,PO | Performed by: INTERNAL MEDICINE

## 2022-10-17 PROCEDURE — 99213 OFFICE O/P EST LOW 20 MIN: CPT | Mod: PBBFAC,PO,25 | Performed by: INTERNAL MEDICINE

## 2022-10-17 PROCEDURE — 99999 PR PBB SHADOW E&M-EST. PATIENT-LVL III: CPT | Mod: PBBFAC,,, | Performed by: INTERNAL MEDICINE

## 2022-10-17 PROCEDURE — G0008 ADMIN INFLUENZA VIRUS VAC: HCPCS | Mod: PBBFAC,PO

## 2022-10-17 PROCEDURE — 0124A COVID-19, MRNA, LNP-S, BIVALENT BOOSTER, PF, 30 MCG/0.3 ML DOSE: CPT | Mod: PBBFAC | Performed by: INTERNAL MEDICINE

## 2022-10-17 PROCEDURE — 99999 PR PBB SHADOW E&M-EST. PATIENT-LVL III: ICD-10-PCS | Mod: PBBFAC,,, | Performed by: INTERNAL MEDICINE

## 2022-10-17 PROCEDURE — 99214 PR OFFICE/OUTPT VISIT, EST, LEVL IV, 30-39 MIN: ICD-10-PCS | Mod: S$PBB,,, | Performed by: INTERNAL MEDICINE

## 2022-10-17 NOTE — PROGRESS NOTES
Subjective     Zara Gonzáles is a 89 y.o. old, male here for Follow-up    88 y/o with PMH of HLD, peripheral neuropathy, B12 def    Overall doing well, no complaints today.    He does his daily work-out and walking. He does all of his ADL's  He had had more difficulty hearing.  Neuropathy is stable.  Reviewed his latest labs. He continues to get B12 injections with no complications.      Review of Systems   Constitutional:         Daytime sleepiness/naps is relatively new   Respiratory: Negative.     Cardiovascular: Negative.    Gastrointestinal: Negative.    Medications     Outpatient Medications Marked as Taking for the 10/17/22 encounter (Office Visit) with Judd Honeycutt MD   Medication Sig Dispense Refill    gabapentin (NEURONTIN) 300 MG capsule TAKE 1 CAPSULE(300 MG) BY MOUTH TWICE DAILY 180 capsule 3    multivit-min/FA/lycopen/lutein (CENTRUM SILVER MEN ORAL) Take by mouth.      pravastatin (PRAVACHOL) 40 MG tablet TAKE 1 TABLET(40 MG) BY MOUTH EVERY DAY 90 tablet 3     Current Facility-Administered Medications for the 10/17/22 encounter (Office Visit) with Judd Honeycutt MD   Medication Dose Route Frequency Provider Last Rate Last Admin    cyanocobalamin injection 1,000 mcg  1,000 mcg Intramuscular Q30 Days Abdirahman Arrington MD   1,000 mcg at 10/10/22 1007     Objective     /62   Pulse 72   Ht 6' (1.829 m)   Wt 84.4 kg (186 lb 1.1 oz)   SpO2 98%   BMI 25.24 kg/m²   Physical Exam  Constitutional:       General: He is not in acute distress.     Appearance: Normal appearance. He is well-developed.   Neurological:      Mental Status: He is alert.     Assessment and Plan     Mixed hyperlipidemia    Gastroesophageal reflux disease without esophagitis    B12 deficiency    Normocytic anemia    Need for vaccination  -     COVID-19-MRNA-(PF)(Pfizer Omicron) Vaccine  -     Influenza - Quadrivalent (Adjuvanted)      Follow up in about 6 months (around 4/17/2023).  ___________________  Judd  MD Yinka  Internal Medicine and Pediatrics

## 2022-11-14 ENCOUNTER — CLINICAL SUPPORT (OUTPATIENT)
Dept: FAMILY MEDICINE | Facility: CLINIC | Age: 87
End: 2022-11-14
Payer: MEDICARE

## 2022-11-14 DIAGNOSIS — E53.8 B12 DEFICIENCY: Primary | ICD-10-CM

## 2022-11-14 PROCEDURE — 99211 PR OFFICE/OUTPT VISIT, EST, LEVL I: ICD-10-PCS | Mod: S$PBB,,, | Performed by: INTERNAL MEDICINE

## 2022-11-14 PROCEDURE — 99211 OFF/OP EST MAY X REQ PHY/QHP: CPT | Mod: S$PBB,,, | Performed by: INTERNAL MEDICINE

## 2022-11-14 PROCEDURE — 96372 THER/PROPH/DIAG INJ SC/IM: CPT | Mod: PBBFAC,PO

## 2022-11-14 RX ADMIN — CYANOCOBALAMIN 1000 MCG: 1000 INJECTION, SOLUTION INTRAMUSCULAR at 10:11

## 2022-11-14 NOTE — PROGRESS NOTES
After obtaining consent, and per orders of Dr. Honeycutt, injection of cyanocobalamin 1000mg given by Maira Lorenzo. Patient instructed to remain in clinic for 20 minutes afterwards, and to report any adverse reaction to me immediately.

## 2022-11-25 PROBLEM — R07.9 CHEST PAIN: Status: ACTIVE | Noted: 2022-11-25

## 2022-11-25 PROBLEM — Z71.89 ACP (ADVANCE CARE PLANNING): Status: ACTIVE | Noted: 2022-11-25

## 2022-11-25 PROBLEM — R35.0 URINARY FREQUENCY: Status: ACTIVE | Noted: 2022-11-25

## 2022-11-26 PROBLEM — R94.39 ABNORMAL STRESS TEST: Status: ACTIVE | Noted: 2022-11-26

## 2022-11-28 ENCOUNTER — TELEPHONE (OUTPATIENT)
Dept: FAMILY MEDICINE | Facility: CLINIC | Age: 87
End: 2022-11-28
Payer: MEDICARE

## 2022-11-28 PROBLEM — I49.1 PAC (PREMATURE ATRIAL CONTRACTION): Status: ACTIVE | Noted: 2022-11-28

## 2022-11-28 NOTE — TELEPHONE ENCOUNTER
----- Message from Morphlabsmarge Grossman sent at 11/28/2022  1:08 PM CST -----  Type:  Sooner Appointment Request  Caller is requesting a sooner appointment.  Caller declined first available appointment listed below.  Caller will not accept being placed on the waitlist and is requesting a message be sent to doctor.  Name of Caller:  Pt   When is the first available appointment?  01/03/23  Symptoms:  1 week Hospital Follow Up   Best Call Back Number:  [phone]  Additional Information:  Pt requesting a call back for a hospital follow up, nothing was available within 7 days.

## 2022-12-07 ENCOUNTER — OFFICE VISIT (OUTPATIENT)
Dept: FAMILY MEDICINE | Facility: CLINIC | Age: 87
End: 2022-12-07
Payer: MEDICARE

## 2022-12-07 ENCOUNTER — LAB VISIT (OUTPATIENT)
Dept: LAB | Facility: HOSPITAL | Age: 87
End: 2022-12-07
Payer: MEDICARE

## 2022-12-07 VITALS
BODY MASS INDEX: 25.77 KG/M2 | HEIGHT: 72 IN | OXYGEN SATURATION: 97 % | SYSTOLIC BLOOD PRESSURE: 122 MMHG | HEART RATE: 60 BPM | WEIGHT: 190.25 LBS | DIASTOLIC BLOOD PRESSURE: 84 MMHG

## 2022-12-07 DIAGNOSIS — D64.9 NORMOCYTIC ANEMIA: ICD-10-CM

## 2022-12-07 DIAGNOSIS — E53.8 B12 DEFICIENCY: ICD-10-CM

## 2022-12-07 DIAGNOSIS — R07.9 CHEST PAIN, UNSPECIFIED TYPE: Primary | ICD-10-CM

## 2022-12-07 LAB
FERRITIN SERPL-MCNC: 23 NG/ML (ref 20–300)
IRON SERPL-MCNC: 53 UG/DL (ref 45–160)
SATURATED IRON: 14 % (ref 20–50)
TOTAL IRON BINDING CAPACITY: 377 UG/DL (ref 250–450)
TRANSFERRIN SERPL-MCNC: 255 MG/DL (ref 200–375)
VIT B12 SERPL-MCNC: 925 PG/ML (ref 210–950)

## 2022-12-07 PROCEDURE — 99214 PR OFFICE/OUTPT VISIT, EST, LEVL IV, 30-39 MIN: ICD-10-PCS | Mod: S$PBB,,, | Performed by: PHYSICIAN ASSISTANT

## 2022-12-07 PROCEDURE — 99213 OFFICE O/P EST LOW 20 MIN: CPT | Mod: PBBFAC,PO | Performed by: PHYSICIAN ASSISTANT

## 2022-12-07 PROCEDURE — 84466 ASSAY OF TRANSFERRIN: CPT | Performed by: PHYSICIAN ASSISTANT

## 2022-12-07 PROCEDURE — 36415 COLL VENOUS BLD VENIPUNCTURE: CPT | Mod: PO | Performed by: PHYSICIAN ASSISTANT

## 2022-12-07 PROCEDURE — 82728 ASSAY OF FERRITIN: CPT | Performed by: PHYSICIAN ASSISTANT

## 2022-12-07 PROCEDURE — 99999 PR PBB SHADOW E&M-EST. PATIENT-LVL III: ICD-10-PCS | Mod: PBBFAC,,, | Performed by: PHYSICIAN ASSISTANT

## 2022-12-07 PROCEDURE — 85025 COMPLETE CBC W/AUTO DIFF WBC: CPT | Performed by: PHYSICIAN ASSISTANT

## 2022-12-07 PROCEDURE — 82607 VITAMIN B-12: CPT | Performed by: PHYSICIAN ASSISTANT

## 2022-12-07 PROCEDURE — 99214 OFFICE O/P EST MOD 30 MIN: CPT | Mod: S$PBB,,, | Performed by: PHYSICIAN ASSISTANT

## 2022-12-07 PROCEDURE — 99999 PR PBB SHADOW E&M-EST. PATIENT-LVL III: CPT | Mod: PBBFAC,,, | Performed by: PHYSICIAN ASSISTANT

## 2022-12-07 NOTE — PROGRESS NOTES
Subjective:      Patient ID: Zara Gonzáles is a 89 y.o. male.    Chief Complaint: Hospital Follow Up (Chest pressure; overnight)    Patient is new to me.    HPI  Patient has PMH of HLD, anemia, and GERD.     Patient went to [facility] ED 11/25/2022 to 11/26/2022 with chest pressure.  Had abnormal chest pain and treated with aspirin, coreg, and nitro prn.    Patient reports no chest pressure since hospital stay.  Denies shortness of breath.  Anemia-getting vitamin B12 shots once a month, but would like to discontinue.    Review of Systems   Constitutional:  Negative for appetite change, chills and fever.   Respiratory:  Negative for shortness of breath.    Cardiovascular:  Negative for chest pain and leg swelling.   Gastrointestinal:  Negative for abdominal pain, blood in stool, constipation, diarrhea, nausea and vomiting.   Neurological:  Negative for dizziness, light-headedness and headaches.       Objective:   /84   Pulse 60   Ht 6' (1.829 m)   Wt 86.3 kg (190 lb 4.1 oz)   SpO2 97%   BMI 25.80 kg/m²     Physical Exam  Vitals reviewed.   Constitutional:       Appearance: Normal appearance. He is well-developed.   HENT:      Head: Normocephalic and atraumatic.      Right Ear: External ear normal.      Left Ear: External ear normal.   Eyes:      Conjunctiva/sclera: Conjunctivae normal.   Cardiovascular:      Rate and Rhythm: Normal rate and regular rhythm.      Heart sounds: Normal heart sounds. No murmur heard.    No friction rub. No gallop.   Pulmonary:      Effort: Pulmonary effort is normal. No respiratory distress.      Breath sounds: Normal breath sounds. No wheezing, rhonchi or rales.   Musculoskeletal:         General: Normal range of motion.      Right lower leg: No edema.      Left lower leg: No edema.   Skin:     General: Skin is warm and dry.      Findings: No rash.   Neurological:      General: No focal deficit present.      Mental Status: He is alert and oriented to person, place, and time.    Psychiatric:         Mood and Affect: Mood normal.         Behavior: Behavior normal.         Judgment: Judgment normal.     Assessment:      1. Chest pain, unspecified type    2. B12 deficiency    3. Normocytic anemia       Plan:   1. Chest pain, unspecified type  Resolved.    2. B12 deficiency  - Vitamin B12; Future    3. Normocytic anemia  - CBC Auto Differential; Future  - IRON AND TIBC; Future  - FERRITIN; Future    Follow up as needed.  Patient agreed with plan and expressed understanding.  I spent 30 minutes on this encounter, time includes face-to-face, chart review, documentation, test review and orders.    Thank you for allowing me to serve you,

## 2022-12-08 LAB
BASOPHILS # BLD AUTO: 0.04 K/UL (ref 0–0.2)
BASOPHILS NFR BLD: 0.5 % (ref 0–1.9)
DIFFERENTIAL METHOD: ABNORMAL
EOSINOPHIL # BLD AUTO: 0.1 K/UL (ref 0–0.5)
EOSINOPHIL NFR BLD: 1.7 % (ref 0–8)
ERYTHROCYTE [DISTWIDTH] IN BLOOD BY AUTOMATED COUNT: 15.9 % (ref 11.5–14.5)
HCT VFR BLD AUTO: 35.6 % (ref 40–54)
HGB BLD-MCNC: 10.9 G/DL (ref 14–18)
IMM GRANULOCYTES # BLD AUTO: 0.02 K/UL (ref 0–0.04)
IMM GRANULOCYTES NFR BLD AUTO: 0.2 % (ref 0–0.5)
LYMPHOCYTES # BLD AUTO: 1.6 K/UL (ref 1–4.8)
LYMPHOCYTES NFR BLD: 19.1 % (ref 18–48)
MCH RBC QN AUTO: 28.6 PG (ref 27–31)
MCHC RBC AUTO-ENTMCNC: 30.6 G/DL (ref 32–36)
MCV RBC AUTO: 93 FL (ref 82–98)
MONOCYTES # BLD AUTO: 1.6 K/UL (ref 0.3–1)
MONOCYTES NFR BLD: 19.5 % (ref 4–15)
NEUTROPHILS # BLD AUTO: 4.8 K/UL (ref 1.8–7.7)
NEUTROPHILS NFR BLD: 59 % (ref 38–73)
NRBC BLD-RTO: 0 /100 WBC
PLATELET # BLD AUTO: 241 K/UL (ref 150–450)
PMV BLD AUTO: 12 FL (ref 9.2–12.9)
RBC # BLD AUTO: 3.81 M/UL (ref 4.6–6.2)
WBC # BLD AUTO: 8.15 K/UL (ref 3.9–12.7)

## 2022-12-12 ENCOUNTER — CLINICAL SUPPORT (OUTPATIENT)
Dept: FAMILY MEDICINE | Facility: CLINIC | Age: 87
End: 2022-12-12
Payer: MEDICARE

## 2022-12-12 DIAGNOSIS — E53.8 B12 DEFICIENCY: Primary | ICD-10-CM

## 2022-12-12 PROCEDURE — 96372 THER/PROPH/DIAG INJ SC/IM: CPT | Mod: PBBFAC,PO

## 2022-12-12 PROCEDURE — 99211 PR OFFICE/OUTPT VISIT, EST, LEVL I: ICD-10-PCS | Mod: S$PBB,,, | Performed by: INTERNAL MEDICINE

## 2022-12-12 PROCEDURE — 99211 OFF/OP EST MAY X REQ PHY/QHP: CPT | Mod: S$PBB,,, | Performed by: INTERNAL MEDICINE

## 2022-12-12 RX ADMIN — CYANOCOBALAMIN 1000 MCG: 1000 INJECTION, SOLUTION INTRAMUSCULAR at 10:12

## 2022-12-12 NOTE — PROGRESS NOTES
After obtaining consent, and per orders of Dr. Honeycutt, injection of cyanocobalamin 1000mg given in left upper outer gluteus muscle by Maira Lorenzo. Patient instructed to remain in clinic for 20 minutes afterwards, and to report any adverse reaction to me immediately.

## 2023-01-09 ENCOUNTER — CLINICAL SUPPORT (OUTPATIENT)
Dept: FAMILY MEDICINE | Facility: CLINIC | Age: 88
End: 2023-01-09
Payer: MEDICARE

## 2023-01-09 DIAGNOSIS — E53.8 B12 DEFICIENCY: Primary | ICD-10-CM

## 2023-01-09 PROCEDURE — 96372 THER/PROPH/DIAG INJ SC/IM: CPT | Mod: PBBFAC,PO

## 2023-01-09 RX ADMIN — CYANOCOBALAMIN 1000 MCG: 1000 INJECTION, SOLUTION INTRAMUSCULAR at 10:01

## 2023-02-13 ENCOUNTER — CLINICAL SUPPORT (OUTPATIENT)
Dept: FAMILY MEDICINE | Facility: CLINIC | Age: 88
End: 2023-02-13
Payer: MEDICARE

## 2023-02-13 DIAGNOSIS — E53.8 B12 DEFICIENCY: Primary | ICD-10-CM

## 2023-02-13 PROCEDURE — 96372 THER/PROPH/DIAG INJ SC/IM: CPT | Mod: PBBFAC,PO

## 2023-02-13 PROCEDURE — 99211 PR OFFICE/OUTPT VISIT, EST, LEVL I: ICD-10-PCS | Mod: S$PBB,,, | Performed by: INTERNAL MEDICINE

## 2023-02-13 PROCEDURE — 99211 OFF/OP EST MAY X REQ PHY/QHP: CPT | Mod: S$PBB,,, | Performed by: INTERNAL MEDICINE

## 2023-02-13 RX ADMIN — CYANOCOBALAMIN 1000 MCG: 1000 INJECTION, SOLUTION INTRAMUSCULAR at 10:02

## 2023-02-13 NOTE — PROGRESS NOTES
Pt here for nurse visit, dx B12 deficiency. Administered IM per orders into left buttock. 2 patient identifier used. Tolerated well.  Next visit already scheduled.

## 2023-03-02 ENCOUNTER — TELEPHONE (OUTPATIENT)
Dept: FAMILY MEDICINE | Facility: CLINIC | Age: 88
End: 2023-03-02
Payer: MEDICARE

## 2023-03-02 NOTE — TELEPHONE ENCOUNTER
----- Message from Treva Hill sent at 3/2/2023 12:13 PM CST -----  Contact: Patient  Type:  Patient Call          Who Called: patient        Does the patient know what this is regarding?: Requesting a same day appt pt said he would like to have his pressure checked ;please advise           Would the patient rather a call back or a response via [facility]? Call           Best Call Back Number: [phone]             Additional Information: Pt said he's not feeling well

## 2023-03-13 ENCOUNTER — CLINICAL SUPPORT (OUTPATIENT)
Dept: FAMILY MEDICINE | Facility: CLINIC | Age: 88
End: 2023-03-13
Payer: MEDICARE

## 2023-03-13 VITALS — RESPIRATION RATE: 17 BRPM

## 2023-03-13 DIAGNOSIS — E53.8 B12 DEFICIENCY: Primary | ICD-10-CM

## 2023-03-13 PROCEDURE — 99211 OFF/OP EST MAY X REQ PHY/QHP: CPT | Mod: S$PBB,,, | Performed by: INTERNAL MEDICINE

## 2023-03-13 PROCEDURE — 99211 PR OFFICE/OUTPT VISIT, EST, LEVL I: ICD-10-PCS | Mod: S$PBB,,, | Performed by: INTERNAL MEDICINE

## 2023-03-13 PROCEDURE — 99211 OFF/OP EST MAY X REQ PHY/QHP: CPT | Mod: PBBFAC,PO

## 2023-03-13 PROCEDURE — 99999 PR PBB SHADOW E&M-EST. PATIENT-LVL I: CPT | Mod: PBBFAC,,,

## 2023-03-13 PROCEDURE — 99999 PR PBB SHADOW E&M-EST. PATIENT-LVL I: ICD-10-PCS | Mod: PBBFAC,,,

## 2023-03-13 PROCEDURE — 96372 THER/PROPH/DIAG INJ SC/IM: CPT | Mod: PBBFAC,PO

## 2023-03-13 RX ADMIN — CYANOCOBALAMIN 1000 MCG: 1000 INJECTION, SOLUTION INTRAMUSCULAR at 10:03

## 2023-03-13 NOTE — PROGRESS NOTES
After obtaining consent, and per orders of Dr. Honeycutt, injection of b12 given in  Right Upper Outer Quad Gluteus by Rosina Lagunas. Patient instructed to remain in clinic for 15 minutes afterwards, and to report any adverse reaction to me immediately.

## 2023-03-14 ENCOUNTER — TELEPHONE (OUTPATIENT)
Dept: FAMILY MEDICINE | Facility: CLINIC | Age: 88
End: 2023-03-14
Payer: MEDICARE

## 2023-03-14 NOTE — TELEPHONE ENCOUNTER
----- Message from Cathy Foster sent at 3/14/2023 12:41 PM CDT -----  Regarding: Call to r/s nurse visit  Type: Needs Medical Advice  Who Called:  Zara Dias Call Back Number: [phone]    Additional Information: Needs to r/s b 12 shot please call to r/s

## 2023-04-03 PROBLEM — K92.1 HEMATOCHEZIA: Status: ACTIVE | Noted: 2023-04-03

## 2023-04-03 PROBLEM — D62 ACUTE BLOOD LOSS ANEMIA: Status: ACTIVE | Noted: 2023-04-03

## 2023-04-03 PROBLEM — K92.2 GI BLEED: Status: ACTIVE | Noted: 2023-04-03

## 2023-04-06 ENCOUNTER — TELEPHONE (OUTPATIENT)
Dept: FAMILY MEDICINE | Facility: CLINIC | Age: 88
End: 2023-04-06
Payer: MEDICARE

## 2023-04-06 NOTE — TELEPHONE ENCOUNTER
----- Message from Ashley Manning sent at 4/6/2023  9:18 AM CDT -----  Contact: pt  Pt is calling he wants to change appt date   Please give pt a call back [phone]

## 2023-04-17 ENCOUNTER — LAB VISIT (OUTPATIENT)
Dept: LAB | Facility: HOSPITAL | Age: 88
End: 2023-04-17
Attending: INTERNAL MEDICINE
Payer: MEDICARE

## 2023-04-17 ENCOUNTER — OFFICE VISIT (OUTPATIENT)
Dept: FAMILY MEDICINE | Facility: CLINIC | Age: 88
End: 2023-04-17
Payer: MEDICARE

## 2023-04-17 VITALS
OXYGEN SATURATION: 97 % | HEART RATE: 82 BPM | DIASTOLIC BLOOD PRESSURE: 78 MMHG | SYSTOLIC BLOOD PRESSURE: 130 MMHG | WEIGHT: 188.94 LBS | BODY MASS INDEX: 25.62 KG/M2

## 2023-04-17 DIAGNOSIS — I49.1 PAC (PREMATURE ATRIAL CONTRACTION): ICD-10-CM

## 2023-04-17 DIAGNOSIS — D62 ACUTE BLOOD LOSS ANEMIA: ICD-10-CM

## 2023-04-17 DIAGNOSIS — E53.8 B12 DEFICIENCY: Primary | ICD-10-CM

## 2023-04-17 LAB
ERYTHROCYTE [DISTWIDTH] IN BLOOD BY AUTOMATED COUNT: 15.9 % (ref 11.5–14.5)
HCT VFR BLD AUTO: 29.5 % (ref 40–54)
HGB BLD-MCNC: 8.8 G/DL (ref 14–18)
MCH RBC QN AUTO: 24.6 PG (ref 27–31)
MCHC RBC AUTO-ENTMCNC: 29.8 G/DL (ref 32–36)
MCV RBC AUTO: 83 FL (ref 82–98)
PLATELET # BLD AUTO: 295 K/UL (ref 150–450)
PMV BLD AUTO: 11.9 FL (ref 9.2–12.9)
RBC # BLD AUTO: 3.57 M/UL (ref 4.6–6.2)
WBC # BLD AUTO: 7.04 K/UL (ref 3.9–12.7)

## 2023-04-17 PROCEDURE — 36415 COLL VENOUS BLD VENIPUNCTURE: CPT | Mod: PO | Performed by: INTERNAL MEDICINE

## 2023-04-17 PROCEDURE — 99999 PR PBB SHADOW E&M-EST. PATIENT-LVL III: CPT | Mod: PBBFAC,,, | Performed by: INTERNAL MEDICINE

## 2023-04-17 PROCEDURE — 99214 OFFICE O/P EST MOD 30 MIN: CPT | Mod: S$PBB,,, | Performed by: INTERNAL MEDICINE

## 2023-04-17 PROCEDURE — 99213 OFFICE O/P EST LOW 20 MIN: CPT | Mod: PBBFAC,PO | Performed by: INTERNAL MEDICINE

## 2023-04-17 PROCEDURE — 99999 PR PBB SHADOW E&M-EST. PATIENT-LVL III: ICD-10-PCS | Mod: PBBFAC,,, | Performed by: INTERNAL MEDICINE

## 2023-04-17 PROCEDURE — 99214 PR OFFICE/OUTPT VISIT, EST, LEVL IV, 30-39 MIN: ICD-10-PCS | Mod: S$PBB,,, | Performed by: INTERNAL MEDICINE

## 2023-04-17 PROCEDURE — 96372 THER/PROPH/DIAG INJ SC/IM: CPT | Mod: PBBFAC,PO

## 2023-04-17 PROCEDURE — 85027 COMPLETE CBC AUTOMATED: CPT | Performed by: INTERNAL MEDICINE

## 2023-04-17 RX ADMIN — CYANOCOBALAMIN 1000 MCG: 1000 INJECTION, SOLUTION INTRAMUSCULAR at 11:04

## 2023-04-17 NOTE — PROGRESS NOTES
Subjective     Zara Gonzáles is a 89 y.o. old, male here for Follow-up    89 y/o with PMH of HLD, peripheral neuropathy, B12 def    Recent BRBPR, went to the ED, EGD/colonoscopy done - no active bleeding.  He has started Fe supplement, c/o dark brown/black stools, no BRBPR.  Dizziness/lightheadedness may have improve some.  B12 Def: stable, due for injection.  HTN/PAC's: On diltiazem once daily now given by cardiology, doing well on that.    Review of Systems   Constitutional: Negative.    Cardiovascular: Negative.    Neurological:  Positive for dizziness. Negative for headaches.   Medications     Outpatient Medications Marked as Taking for the 4/17/23 encounter (Office Visit) with Judd Honeycutt MD   Medication Sig Dispense Refill    acetaminophen (TYLENOL) 500 MG tablet Take 1,000 mg by mouth every 6 (six) hours as needed (headache).      artificial tears (TEARS LUBRICANT EYE DROP) 0.5 % ophthalmic solution Place 1 drop into both eyes daily as needed (dry eye (s)).      aspirin (ECOTRIN) 81 MG EC tablet Take 1 tablet (81 mg total) by mouth once daily. 30 tablet 11    diltiaZEM (CARDIZEM) 60 MG tablet Take 1 tablet (60 mg total) by mouth every 12 (twelve) hours. 180 tablet 3    ferrous sulfate 324 mg (65 mg iron) TbEC Take 1 tablet (324 mg total) by mouth once daily. for 21 days 21 tablet 0    gabapentin (NEURONTIN) 300 MG capsule Take 1 capsule (300 mg total) by mouth 2 (two) times daily.      multivit-min/FA/lycopen/lutein (CENTRUM SILVER MEN ORAL) Take 1 tablet by mouth once daily.      nitroGLYCERIN (NITROSTAT) 0.4 MG SL tablet Place 1 tablet (0.4 mg total) under the tongue every 5 (five) minutes as needed for Chest pain. 30 tablet 0    pravastatin (PRAVACHOL) 40 MG tablet Take 1 tablet (40 mg total) by mouth once daily.      [DISCONTINUED] carvediloL (COREG) 6.25 MG tablet Take 1 tablet (6.25 mg total) by mouth 2 (two) times daily with meals.       Current Facility-Administered Medications for the  4/17/23 encounter (Office Visit) with Judd Honeycutt MD   Medication Dose Route Frequency Provider Last Rate Last Admin    cyanocobalamin injection 1,000 mcg  1,000 mcg Intramuscular Q30 Days Abdirahman Arrington MD   1,000 mcg at 04/17/23 1123     Objective     /78   Pulse 82   Wt 85.7 kg (188 lb 15 oz)   SpO2 97%   BMI 25.62 kg/m²   Physical Exam  Constitutional:       General: He is not in acute distress.     Appearance: Normal appearance. He is well-developed.   Cardiovascular:      Rate and Rhythm: Normal rate and regular rhythm.      Heart sounds: No murmur heard.  Pulmonary:      Effort: Pulmonary effort is normal. No respiratory distress.      Breath sounds: Normal breath sounds.     Assessment and Plan     B12 deficiency    Acute blood loss anemia  -     CBC Without Differential; Future; Expected date: 04/17/2023    PAC (premature atrial contraction)      BP dropped to 120/60 after 1 minute standing. May consider holding diltizem, but don't know that it will make that much of a difference.  Repeat CBC  Cont B12 shots.    Follow up in about 6 months (around 10/17/2023).  ___________________  Judd Honeycutt MD  Internal Medicine and Pediatrics

## 2023-04-18 ENCOUNTER — PATIENT MESSAGE (OUTPATIENT)
Dept: FAMILY MEDICINE | Facility: CLINIC | Age: 88
End: 2023-04-18
Payer: MEDICARE

## 2023-04-18 DIAGNOSIS — D62 ACUTE BLOOD LOSS ANEMIA: Primary | ICD-10-CM

## 2023-05-01 ENCOUNTER — TELEPHONE (OUTPATIENT)
Dept: FAMILY MEDICINE | Facility: CLINIC | Age: 88
End: 2023-05-01
Payer: MEDICARE

## 2023-05-01 NOTE — TELEPHONE ENCOUNTER
----- Message from Rama Hummel sent at 5/1/2023  2:21 PM CDT -----  Contact: patient  Type:  Patient Returning Call    Who Called:  patient  Who Left Message for Patient:    Does the patient know what this is regarding?:  results  Best Call Back Number:  [phone] (home)   Additional Information:

## 2023-05-05 ENCOUNTER — TELEPHONE (OUTPATIENT)
Dept: FAMILY MEDICINE | Facility: CLINIC | Age: 88
End: 2023-05-05
Payer: MEDICARE

## 2023-05-05 NOTE — TELEPHONE ENCOUNTER
----- Message from Judd Honeycutt MD sent at 4/18/2023 11:50 AM CDT -----  Please call the patient and let him know his CBC is stable. I want him to get his CBC rechecked the next time he is back in clinic for his B12 shot. The order is in.

## 2023-05-17 ENCOUNTER — CLINICAL SUPPORT (OUTPATIENT)
Dept: FAMILY MEDICINE | Facility: CLINIC | Age: 88
End: 2023-05-17
Payer: MEDICARE

## 2023-05-17 ENCOUNTER — LAB VISIT (OUTPATIENT)
Dept: LAB | Facility: HOSPITAL | Age: 88
End: 2023-05-17
Attending: INTERNAL MEDICINE
Payer: MEDICARE

## 2023-05-17 DIAGNOSIS — E53.8 B12 DEFICIENCY: Primary | ICD-10-CM

## 2023-05-17 DIAGNOSIS — D62 ACUTE BLOOD LOSS ANEMIA: ICD-10-CM

## 2023-05-17 LAB
ERYTHROCYTE [DISTWIDTH] IN BLOOD BY AUTOMATED COUNT: 17.5 % (ref 11.5–14.5)
HCT VFR BLD AUTO: 28.4 % (ref 40–54)
HGB BLD-MCNC: 8.4 G/DL (ref 14–18)
MCH RBC QN AUTO: 23.7 PG (ref 27–31)
MCHC RBC AUTO-ENTMCNC: 29.6 G/DL (ref 32–36)
MCV RBC AUTO: 80 FL (ref 82–98)
PLATELET # BLD AUTO: 221 K/UL (ref 150–450)
PMV BLD AUTO: 11.3 FL (ref 9.2–12.9)
RBC # BLD AUTO: 3.55 M/UL (ref 4.6–6.2)
WBC # BLD AUTO: 5.67 K/UL (ref 3.9–12.7)

## 2023-05-17 PROCEDURE — 99999 PR PBB SHADOW E&M-EST. PATIENT-LVL I: ICD-10-PCS | Mod: PBBFAC,,,

## 2023-05-17 PROCEDURE — 85027 COMPLETE CBC AUTOMATED: CPT | Performed by: INTERNAL MEDICINE

## 2023-05-17 PROCEDURE — 96372 THER/PROPH/DIAG INJ SC/IM: CPT | Mod: PBBFAC,PO

## 2023-05-17 PROCEDURE — 99211 PR OFFICE/OUTPT VISIT, EST, LEVL I: ICD-10-PCS | Mod: S$PBB,,, | Performed by: INTERNAL MEDICINE

## 2023-05-17 PROCEDURE — 99211 OFF/OP EST MAY X REQ PHY/QHP: CPT | Mod: PBBFAC,25,PO

## 2023-05-17 PROCEDURE — 99999 PR PBB SHADOW E&M-EST. PATIENT-LVL I: CPT | Mod: PBBFAC,,,

## 2023-05-17 PROCEDURE — 99211 OFF/OP EST MAY X REQ PHY/QHP: CPT | Mod: S$PBB,,, | Performed by: INTERNAL MEDICINE

## 2023-05-17 PROCEDURE — 36415 COLL VENOUS BLD VENIPUNCTURE: CPT | Mod: PO | Performed by: INTERNAL MEDICINE

## 2023-05-17 RX ADMIN — CYANOCOBALAMIN 1000 MCG: 1000 INJECTION, SOLUTION INTRAMUSCULAR at 10:05

## 2023-05-17 NOTE — PROGRESS NOTES
After obtaining consent, and per orders of Dr. Honeycutt, injection of B12 given by Neli Cee. Patient instructed to remain in clinic for 20 minutes afterwards, and to report any adverse reaction to me immediately.

## 2023-05-19 ENCOUNTER — PATIENT MESSAGE (OUTPATIENT)
Dept: FAMILY MEDICINE | Facility: CLINIC | Age: 88
End: 2023-05-19
Payer: MEDICARE

## 2023-05-19 DIAGNOSIS — D62 ACUTE BLOOD LOSS ANEMIA: Primary | ICD-10-CM

## 2023-05-19 DIAGNOSIS — E78.2 MIXED HYPERLIPIDEMIA: ICD-10-CM

## 2023-05-19 RX ORDER — PRAVASTATIN SODIUM 40 MG/1
TABLET ORAL
Qty: 90 TABLET | Refills: 1 | Status: SHIPPED | OUTPATIENT
Start: 2023-05-19 | End: 2023-10-17 | Stop reason: SDUPTHER

## 2023-05-19 RX ORDER — FERROUS SULFATE 325(65) MG
325 TABLET ORAL
Qty: 36 TABLET | Refills: 0 | Status: SHIPPED | OUTPATIENT
Start: 2023-05-19 | End: 2023-08-01 | Stop reason: SDUPTHER

## 2023-05-19 NOTE — TELEPHONE ENCOUNTER
No care due was identified.  [facility] Embedded Care Due Messages. Reference number: [id number].   5/19/2023 9:25:23 AM CDT

## 2023-05-19 NOTE — TELEPHONE ENCOUNTER
Refill Decision Note   Zara Gonzáles  is requesting a refill authorization.  Brief Assessment and Rationale for Refill:  Approve     Medication Therapy Plan:         Comments:     Note composed:12:26 PM 05/19/2023

## 2023-06-01 ENCOUNTER — TELEPHONE (OUTPATIENT)
Dept: FAMILY MEDICINE | Facility: CLINIC | Age: 88
End: 2023-06-01
Payer: MEDICARE

## 2023-06-01 NOTE — TELEPHONE ENCOUNTER
----- Message from Ngoc Castro sent at 6/1/2023 12:43 PM CDT -----  Contact: Pt @ [phone]  Type:  Patient Returning Call    Who Called:Pt  Who Left Message for Patient: Unknown  Does the patient know what this is regarding?:no  Would the patient rather a call back or a response via [facility]? call  Best Call Back Number:[phone]    Additional Information: Pt is returning a call from the office. Please call pt back to advise.

## 2023-06-01 NOTE — TELEPHONE ENCOUNTER
No notes on who called the patient. Patient does not know who called him and don't know what it was for.

## 2023-06-22 ENCOUNTER — CLINICAL SUPPORT (OUTPATIENT)
Dept: FAMILY MEDICINE | Facility: CLINIC | Age: 88
End: 2023-06-22
Payer: MEDICARE

## 2023-06-22 DIAGNOSIS — E53.8 B12 DEFICIENCY: Primary | ICD-10-CM

## 2023-06-22 PROCEDURE — 99211 OFF/OP EST MAY X REQ PHY/QHP: CPT | Mod: S$PBB,,, | Performed by: INTERNAL MEDICINE

## 2023-06-22 PROCEDURE — 99211 PR OFFICE/OUTPT VISIT, EST, LEVL I: ICD-10-PCS | Mod: S$PBB,,, | Performed by: INTERNAL MEDICINE

## 2023-06-22 PROCEDURE — 96372 THER/PROPH/DIAG INJ SC/IM: CPT | Mod: PBBFAC,PO

## 2023-06-22 RX ADMIN — CYANOCOBALAMIN 1000 MCG: 1000 INJECTION, SOLUTION INTRAMUSCULAR at 10:06

## 2023-06-22 NOTE — PROGRESS NOTES
After obtaining consent, and per orders of Dr. TAYLOR, injection of B-12 1000mcg given by Maira Lorenzo. Patient instructed to remain in clinic for 20 minutes afterwards, and to report any adverse reaction to me immediately.

## 2023-06-25 PROBLEM — I25.10 CAD (CORONARY ARTERY DISEASE): Status: ACTIVE | Noted: 2023-06-25

## 2023-06-25 PROBLEM — K92.2 LOWER GI BLEED: Status: ACTIVE | Noted: 2023-06-25

## 2023-06-26 ENCOUNTER — HOSPITAL ENCOUNTER (INPATIENT)
Facility: HOSPITAL | Age: 88
LOS: 4 days | Discharge: HOME HEALTH | DRG: 377 | End: 2023-06-30
Attending: INTERNAL MEDICINE | Admitting: INTERNAL MEDICINE
Payer: MEDICARE

## 2023-06-26 ENCOUNTER — ANESTHESIA EVENT (OUTPATIENT)
Dept: ENDOSCOPY | Facility: HOSPITAL | Age: 88
DRG: 377 | End: 2023-06-26
Payer: MEDICARE

## 2023-06-26 DIAGNOSIS — R57.8 HEMORRHAGIC SHOCK: ICD-10-CM

## 2023-06-26 DIAGNOSIS — E83.39 HYPOPHOSPHATEMIA: ICD-10-CM

## 2023-06-26 DIAGNOSIS — K92.2 GI (GASTROINTESTINAL BLEED): ICD-10-CM

## 2023-06-26 DIAGNOSIS — D62 ACUTE BLOOD LOSS ANEMIA: ICD-10-CM

## 2023-06-26 DIAGNOSIS — K92.2 LOWER GI BLEED: Primary | ICD-10-CM

## 2023-06-26 DIAGNOSIS — I25.10 CORONARY ARTERY DISEASE, UNSPECIFIED VESSEL OR LESION TYPE, UNSPECIFIED WHETHER ANGINA PRESENT, UNSPECIFIED WHETHER NATIVE OR TRANSPLANTED HEART: ICD-10-CM

## 2023-06-26 DIAGNOSIS — K21.9 GASTROESOPHAGEAL REFLUX DISEASE WITHOUT ESOPHAGITIS: ICD-10-CM

## 2023-06-26 DIAGNOSIS — E78.2 MIXED HYPERLIPIDEMIA: ICD-10-CM

## 2023-06-26 DIAGNOSIS — R93.3 ABNORMAL CT SCAN, COLON: ICD-10-CM

## 2023-06-26 DIAGNOSIS — K57.30 DIVERTICULOSIS OF COLON: ICD-10-CM

## 2023-06-26 LAB
ABO + RH BLD: NORMAL
ALBUMIN SERPL BCP-MCNC: 2.7 G/DL (ref 3.5–5.2)
ALBUMIN SERPL BCP-MCNC: 2.8 G/DL (ref 3.5–5.2)
ALP SERPL-CCNC: 41 U/L (ref 55–135)
ALP SERPL-CCNC: 43 U/L (ref 55–135)
ALT SERPL W/O P-5'-P-CCNC: 11 U/L (ref 10–44)
ALT SERPL W/O P-5'-P-CCNC: 8 U/L (ref 10–44)
ANION GAP SERPL CALC-SCNC: 9 MMOL/L (ref 8–16)
ANION GAP SERPL CALC-SCNC: 9 MMOL/L (ref 8–16)
ANISOCYTOSIS BLD QL SMEAR: SLIGHT
APTT PPP: 27.7 SEC (ref 21–32)
AST SERPL-CCNC: 14 U/L (ref 10–40)
AST SERPL-CCNC: 17 U/L (ref 10–40)
BASOPHILS # BLD AUTO: 0.02 K/UL (ref 0–0.2)
BASOPHILS # BLD AUTO: 0.04 K/UL (ref 0–0.2)
BASOPHILS # BLD AUTO: 0.05 K/UL (ref 0–0.2)
BASOPHILS # BLD AUTO: 0.05 K/UL (ref 0–0.2)
BASOPHILS # BLD AUTO: ABNORMAL K/UL (ref 0–0.2)
BASOPHILS NFR BLD: 0 % (ref 0–1.9)
BASOPHILS NFR BLD: 0.2 % (ref 0–1.9)
BASOPHILS NFR BLD: 0.4 % (ref 0–1.9)
BASOPHILS NFR BLD: 0.4 % (ref 0–1.9)
BASOPHILS NFR BLD: 0.5 % (ref 0–1.9)
BILIRUB SERPL-MCNC: 1.3 MG/DL (ref 0.1–1)
BILIRUB SERPL-MCNC: 1.5 MG/DL (ref 0.1–1)
BLD GP AB SCN CELLS X3 SERPL QL: NORMAL
BLD PROD TYP BPU: NORMAL
BLOOD UNIT EXPIRATION DATE: NORMAL
BLOOD UNIT TYPE CODE: 5100
BLOOD UNIT TYPE CODE: 8400
BLOOD UNIT TYPE: NORMAL
BNP SERPL-MCNC: 31 PG/ML (ref 0–99)
BUN SERPL-MCNC: 15 MG/DL (ref 8–23)
BUN SERPL-MCNC: 18 MG/DL (ref 8–23)
BURR CELLS BLD QL SMEAR: ABNORMAL
CALCIUM SERPL-MCNC: 7.7 MG/DL (ref 8.7–10.5)
CALCIUM SERPL-MCNC: 7.8 MG/DL (ref 8.7–10.5)
CHLORIDE SERPL-SCNC: 110 MMOL/L (ref 95–110)
CHLORIDE SERPL-SCNC: 111 MMOL/L (ref 95–110)
CO2 SERPL-SCNC: 20 MMOL/L (ref 23–29)
CO2 SERPL-SCNC: 20 MMOL/L (ref 23–29)
CODING SYSTEM: NORMAL
CREAT SERPL-MCNC: 0.9 MG/DL (ref 0.5–1.4)
CREAT SERPL-MCNC: 1 MG/DL (ref 0.5–1.4)
CROSSMATCH INTERPRETATION: NORMAL
DIFFERENTIAL METHOD: ABNORMAL
DISPENSE STATUS: NORMAL
EOSINOPHIL # BLD AUTO: 0 K/UL (ref 0–0.5)
EOSINOPHIL # BLD AUTO: 0 K/UL (ref 0–0.5)
EOSINOPHIL # BLD AUTO: 0.1 K/UL (ref 0–0.5)
EOSINOPHIL # BLD AUTO: 0.1 K/UL (ref 0–0.5)
EOSINOPHIL # BLD AUTO: ABNORMAL K/UL (ref 0–0.5)
EOSINOPHIL NFR BLD: 0 % (ref 0–8)
EOSINOPHIL NFR BLD: 0.3 % (ref 0–8)
EOSINOPHIL NFR BLD: 0.4 % (ref 0–8)
EOSINOPHIL NFR BLD: 0.5 % (ref 0–8)
EOSINOPHIL NFR BLD: 0.7 % (ref 0–8)
ERYTHROCYTE [DISTWIDTH] IN BLOOD BY AUTOMATED COUNT: 17.5 % (ref 11.5–14.5)
ERYTHROCYTE [DISTWIDTH] IN BLOOD BY AUTOMATED COUNT: 17.5 % (ref 11.5–14.5)
ERYTHROCYTE [DISTWIDTH] IN BLOOD BY AUTOMATED COUNT: 17.8 % (ref 11.5–14.5)
ERYTHROCYTE [DISTWIDTH] IN BLOOD BY AUTOMATED COUNT: 17.8 % (ref 11.5–14.5)
ERYTHROCYTE [DISTWIDTH] IN BLOOD BY AUTOMATED COUNT: 17.9 % (ref 11.5–14.5)
EST. GFR  (NO RACE VARIABLE): >60 ML/MIN/1.73 M^2
EST. GFR  (NO RACE VARIABLE): >60 ML/MIN/1.73 M^2
GIANT PLATELETS BLD QL SMEAR: PRESENT
GLUCOSE SERPL-MCNC: 104 MG/DL (ref 70–110)
GLUCOSE SERPL-MCNC: 92 MG/DL (ref 70–110)
HCT VFR BLD AUTO: 18.6 % (ref 40–54)
HCT VFR BLD AUTO: 24.9 % (ref 40–54)
HCT VFR BLD AUTO: 27.5 % (ref 40–54)
HCT VFR BLD AUTO: 27.8 % (ref 40–54)
HCT VFR BLD AUTO: 30.3 % (ref 40–54)
HGB BLD-MCNC: 5.8 G/DL (ref 14–18)
HGB BLD-MCNC: 8 G/DL (ref 14–18)
HGB BLD-MCNC: 8.7 G/DL (ref 14–18)
HGB BLD-MCNC: 9 G/DL (ref 14–18)
HGB BLD-MCNC: 9.6 G/DL (ref 14–18)
HYPOCHROMIA BLD QL SMEAR: ABNORMAL
IMM GRANULOCYTES # BLD AUTO: 0.04 K/UL (ref 0–0.04)
IMM GRANULOCYTES # BLD AUTO: ABNORMAL K/UL (ref 0–0.04)
IMM GRANULOCYTES NFR BLD AUTO: 0.3 % (ref 0–0.5)
IMM GRANULOCYTES NFR BLD AUTO: 0.4 % (ref 0–0.5)
IMM GRANULOCYTES NFR BLD AUTO: ABNORMAL % (ref 0–0.5)
INR PPP: 1.1 (ref 0.8–1.2)
LYMPHOCYTES # BLD AUTO: 1.3 K/UL (ref 1–4.8)
LYMPHOCYTES # BLD AUTO: 1.5 K/UL (ref 1–4.8)
LYMPHOCYTES # BLD AUTO: 1.5 K/UL (ref 1–4.8)
LYMPHOCYTES # BLD AUTO: 1.9 K/UL (ref 1–4.8)
LYMPHOCYTES # BLD AUTO: ABNORMAL K/UL (ref 1–4.8)
LYMPHOCYTES NFR BLD: 13.7 % (ref 18–48)
LYMPHOCYTES NFR BLD: 15 % (ref 18–48)
LYMPHOCYTES NFR BLD: 16.3 % (ref 18–48)
LYMPHOCYTES NFR BLD: 16.3 % (ref 18–48)
LYMPHOCYTES NFR BLD: 16.5 % (ref 18–48)
MAGNESIUM SERPL-MCNC: 1.8 MG/DL (ref 1.6–2.6)
MAGNESIUM SERPL-MCNC: 1.9 MG/DL (ref 1.6–2.6)
MCH RBC QN AUTO: 26.4 PG (ref 27–31)
MCH RBC QN AUTO: 26.6 PG (ref 27–31)
MCH RBC QN AUTO: 27 PG (ref 27–31)
MCH RBC QN AUTO: 27.3 PG (ref 27–31)
MCH RBC QN AUTO: 27.4 PG (ref 27–31)
MCHC RBC AUTO-ENTMCNC: 31.2 G/DL (ref 32–36)
MCHC RBC AUTO-ENTMCNC: 31.6 G/DL (ref 32–36)
MCHC RBC AUTO-ENTMCNC: 31.7 G/DL (ref 32–36)
MCHC RBC AUTO-ENTMCNC: 32.1 G/DL (ref 32–36)
MCHC RBC AUTO-ENTMCNC: 32.4 G/DL (ref 32–36)
MCV RBC AUTO: 84 FL (ref 82–98)
MCV RBC AUTO: 84 FL (ref 82–98)
MCV RBC AUTO: 85 FL (ref 82–98)
MCV RBC AUTO: 85 FL (ref 82–98)
MCV RBC AUTO: 87 FL (ref 82–98)
METAMYELOCYTES NFR BLD MANUAL: 2 %
MONOCYTES # BLD AUTO: 1.8 K/UL (ref 0.3–1)
MONOCYTES # BLD AUTO: 2 K/UL (ref 0.3–1)
MONOCYTES # BLD AUTO: 2.2 K/UL (ref 0.3–1)
MONOCYTES # BLD AUTO: 2.2 K/UL (ref 0.3–1)
MONOCYTES # BLD AUTO: ABNORMAL K/UL (ref 0.3–1)
MONOCYTES NFR BLD: 14 % (ref 4–15)
MONOCYTES NFR BLD: 18.9 % (ref 4–15)
MONOCYTES NFR BLD: 19.5 % (ref 4–15)
MONOCYTES NFR BLD: 22.1 % (ref 4–15)
MONOCYTES NFR BLD: 22.4 % (ref 4–15)
MYELOCYTES NFR BLD MANUAL: 2 %
NEUTROPHILS # BLD AUTO: 5.5 K/UL (ref 1.8–7.7)
NEUTROPHILS # BLD AUTO: 5.8 K/UL (ref 1.8–7.7)
NEUTROPHILS # BLD AUTO: 6.1 K/UL (ref 1.8–7.7)
NEUTROPHILS # BLD AUTO: 7.3 K/UL (ref 1.8–7.7)
NEUTROPHILS NFR BLD: 60.5 % (ref 38–73)
NEUTROPHILS NFR BLD: 62.4 % (ref 38–73)
NEUTROPHILS NFR BLD: 62.9 % (ref 38–73)
NEUTROPHILS NFR BLD: 63.6 % (ref 38–73)
NEUTROPHILS NFR BLD: 66 % (ref 38–73)
NEUTS BAND NFR BLD MANUAL: 1 %
NRBC BLD-RTO: 0 /100 WBC
NUM UNITS TRANS PACKED RBC: NORMAL
NUM UNITS TRANS PACKED RBC: NORMAL
OVALOCYTES BLD QL SMEAR: ABNORMAL
OVALOCYTES BLD QL SMEAR: ABNORMAL
PHOSPHATE SERPL-MCNC: 2.5 MG/DL (ref 2.7–4.5)
PHOSPHATE SERPL-MCNC: 2.7 MG/DL (ref 2.7–4.5)
PLATELET # BLD AUTO: 102 K/UL (ref 150–450)
PLATELET # BLD AUTO: 134 K/UL (ref 150–450)
PLATELET # BLD AUTO: 137 K/UL (ref 150–450)
PLATELET # BLD AUTO: 160 K/UL (ref 150–450)
PLATELET # BLD AUTO: 173 K/UL (ref 150–450)
PLATELET BLD QL SMEAR: ABNORMAL
PMV BLD AUTO: 11.2 FL (ref 9.2–12.9)
PMV BLD AUTO: 11.3 FL (ref 9.2–12.9)
PMV BLD AUTO: 11.6 FL (ref 9.2–12.9)
PMV BLD AUTO: 11.7 FL (ref 9.2–12.9)
PMV BLD AUTO: 12.2 FL (ref 9.2–12.9)
POLYCHROMASIA BLD QL SMEAR: ABNORMAL
POLYCHROMASIA BLD QL SMEAR: ABNORMAL
POTASSIUM SERPL-SCNC: 4 MMOL/L (ref 3.5–5.1)
POTASSIUM SERPL-SCNC: 4.1 MMOL/L (ref 3.5–5.1)
PROT SERPL-MCNC: 4.8 G/DL (ref 6–8.4)
PROT SERPL-MCNC: 5 G/DL (ref 6–8.4)
PROTHROMBIN TIME: 11.6 SEC (ref 9–12.5)
RBC # BLD AUTO: 2.15 M/UL (ref 4.6–6.2)
RBC # BLD AUTO: 2.93 M/UL (ref 4.6–6.2)
RBC # BLD AUTO: 3.27 M/UL (ref 4.6–6.2)
RBC # BLD AUTO: 3.29 M/UL (ref 4.6–6.2)
RBC # BLD AUTO: 3.63 M/UL (ref 4.6–6.2)
SODIUM SERPL-SCNC: 139 MMOL/L (ref 136–145)
SODIUM SERPL-SCNC: 140 MMOL/L (ref 136–145)
SPECIMEN OUTDATE: NORMAL
SPHEROCYTES BLD QL SMEAR: ABNORMAL
SPHEROCYTES BLD QL SMEAR: ABNORMAL
TRANS ERYTHROCYTES VOL PATIENT: NORMAL ML
UNIT NUMBER: NORMAL
WBC # BLD AUTO: 11.51 K/UL (ref 3.9–12.7)
WBC # BLD AUTO: 12.36 K/UL (ref 3.9–12.7)
WBC # BLD AUTO: 9.13 K/UL (ref 3.9–12.7)
WBC # BLD AUTO: 9.29 K/UL (ref 3.9–12.7)
WBC # BLD AUTO: 9.72 K/UL (ref 3.9–12.7)

## 2023-06-26 PROCEDURE — 36430 TRANSFUSION BLD/BLD COMPNT: CPT

## 2023-06-26 PROCEDURE — 86920 COMPATIBILITY TEST SPIN: CPT

## 2023-06-26 PROCEDURE — 83735 ASSAY OF MAGNESIUM: CPT | Performed by: INTERNAL MEDICINE

## 2023-06-26 PROCEDURE — P9017 PLASMA 1 DONOR FRZ W/IN 8 HR: HCPCS | Performed by: NURSE PRACTITIONER

## 2023-06-26 PROCEDURE — 86920 COMPATIBILITY TEST SPIN: CPT | Performed by: NURSE PRACTITIONER

## 2023-06-26 PROCEDURE — P9021 RED BLOOD CELLS UNIT: HCPCS

## 2023-06-26 PROCEDURE — 85027 COMPLETE CBC AUTOMATED: CPT | Performed by: INTERNAL MEDICINE

## 2023-06-26 PROCEDURE — 85025 COMPLETE CBC W/AUTO DIFF WBC: CPT | Mod: 91

## 2023-06-26 PROCEDURE — P9016 RBC LEUKOCYTES REDUCED: HCPCS | Performed by: NURSE PRACTITIONER

## 2023-06-26 PROCEDURE — C9113 INJ PANTOPRAZOLE SODIUM, VIA: HCPCS

## 2023-06-26 PROCEDURE — P9035 PLATELET PHERES LEUKOREDUCED: HCPCS | Performed by: NURSE PRACTITIONER

## 2023-06-26 PROCEDURE — 86900 BLOOD TYPING SEROLOGIC ABO: CPT

## 2023-06-26 PROCEDURE — 83880 ASSAY OF NATRIURETIC PEPTIDE: CPT | Performed by: INTERNAL MEDICINE

## 2023-06-26 PROCEDURE — 94761 N-INVAS EAR/PLS OXIMETRY MLT: CPT

## 2023-06-26 PROCEDURE — 20000000 HC ICU ROOM

## 2023-06-26 PROCEDURE — 63600175 PHARM REV CODE 636 W HCPCS: Performed by: RADIOLOGY

## 2023-06-26 PROCEDURE — 99223 1ST HOSP IP/OBS HIGH 75: CPT | Mod: ,,,

## 2023-06-26 PROCEDURE — 99291 PR CRITICAL CARE, E/M 30-74 MINUTES: ICD-10-PCS | Mod: ,,, | Performed by: NURSE PRACTITIONER

## 2023-06-26 PROCEDURE — 80053 COMPREHEN METABOLIC PANEL: CPT | Performed by: INTERNAL MEDICINE

## 2023-06-26 PROCEDURE — 80053 COMPREHEN METABOLIC PANEL: CPT | Mod: 91

## 2023-06-26 PROCEDURE — 84100 ASSAY OF PHOSPHORUS: CPT | Performed by: INTERNAL MEDICINE

## 2023-06-26 PROCEDURE — 99223 PR INITIAL HOSPITAL CARE,LEVL III: ICD-10-PCS | Mod: GC,,, | Performed by: INTERNAL MEDICINE

## 2023-06-26 PROCEDURE — 25000003 PHARM REV CODE 250

## 2023-06-26 PROCEDURE — 85007 BL SMEAR W/DIFF WBC COUNT: CPT | Performed by: INTERNAL MEDICINE

## 2023-06-26 PROCEDURE — 63600175 PHARM REV CODE 636 W HCPCS

## 2023-06-26 PROCEDURE — 99223 1ST HOSP IP/OBS HIGH 75: CPT | Mod: GC,,, | Performed by: INTERNAL MEDICINE

## 2023-06-26 PROCEDURE — 99291 CRITICAL CARE FIRST HOUR: CPT | Mod: ,,, | Performed by: NURSE PRACTITIONER

## 2023-06-26 PROCEDURE — 25000003 PHARM REV CODE 250: Performed by: NURSE PRACTITIONER

## 2023-06-26 PROCEDURE — 25000003 PHARM REV CODE 250: Performed by: STUDENT IN AN ORGANIZED HEALTH CARE EDUCATION/TRAINING PROGRAM

## 2023-06-26 PROCEDURE — 85025 COMPLETE CBC W/AUTO DIFF WBC: CPT | Mod: 91 | Performed by: INTERNAL MEDICINE

## 2023-06-26 PROCEDURE — 85610 PROTHROMBIN TIME: CPT

## 2023-06-26 PROCEDURE — 83735 ASSAY OF MAGNESIUM: CPT | Mod: 91

## 2023-06-26 PROCEDURE — 99223 PR INITIAL HOSPITAL CARE,LEVL III: ICD-10-PCS | Mod: ,,,

## 2023-06-26 PROCEDURE — 84100 ASSAY OF PHOSPHORUS: CPT | Mod: 91

## 2023-06-26 PROCEDURE — 25500020 PHARM REV CODE 255: Performed by: INTERNAL MEDICINE

## 2023-06-26 PROCEDURE — 85730 THROMBOPLASTIN TIME PARTIAL: CPT

## 2023-06-26 RX ORDER — HYDROCODONE BITARTRATE AND ACETAMINOPHEN 500; 5 MG/1; MG/1
TABLET ORAL
Status: DISCONTINUED | OUTPATIENT
Start: 2023-06-26 | End: 2023-06-27

## 2023-06-26 RX ORDER — NOREPINEPHRINE BITARTRATE/D5W 4MG/250ML
0-.2 PLASTIC BAG, INJECTION (ML) INTRAVENOUS CONTINUOUS
Status: DISCONTINUED | OUTPATIENT
Start: 2023-06-26 | End: 2023-06-27

## 2023-06-26 RX ORDER — MIDAZOLAM HYDROCHLORIDE 1 MG/ML
INJECTION INTRAMUSCULAR; INTRAVENOUS
Status: COMPLETED | OUTPATIENT
Start: 2023-06-26 | End: 2023-06-26

## 2023-06-26 RX ORDER — GABAPENTIN 300 MG/1
300 CAPSULE ORAL 2 TIMES DAILY
Status: DISCONTINUED | OUTPATIENT
Start: 2023-06-26 | End: 2023-06-30 | Stop reason: HOSPADM

## 2023-06-26 RX ORDER — ONDANSETRON 2 MG/ML
4 INJECTION INTRAMUSCULAR; INTRAVENOUS EVERY 8 HOURS PRN
Status: DISCONTINUED | OUTPATIENT
Start: 2023-06-26 | End: 2023-06-30 | Stop reason: HOSPADM

## 2023-06-26 RX ORDER — PANTOPRAZOLE SODIUM 40 MG/10ML
80 INJECTION, POWDER, LYOPHILIZED, FOR SOLUTION INTRAVENOUS ONCE
Status: COMPLETED | OUTPATIENT
Start: 2023-06-26 | End: 2023-06-26

## 2023-06-26 RX ORDER — PRAVASTATIN SODIUM 40 MG/1
40 TABLET ORAL DAILY
Status: DISCONTINUED | OUTPATIENT
Start: 2023-06-26 | End: 2023-06-30 | Stop reason: HOSPADM

## 2023-06-26 RX ORDER — FENTANYL CITRATE 50 UG/ML
INJECTION, SOLUTION INTRAMUSCULAR; INTRAVENOUS
Status: COMPLETED | OUTPATIENT
Start: 2023-06-26 | End: 2023-06-26

## 2023-06-26 RX ORDER — SODIUM CHLORIDE 0.9 % (FLUSH) 0.9 %
10 SYRINGE (ML) INJECTION
Status: DISCONTINUED | OUTPATIENT
Start: 2023-06-26 | End: 2023-06-30 | Stop reason: HOSPADM

## 2023-06-26 RX ORDER — POLYETHYLENE GLYCOL 3350, SODIUM SULFATE ANHYDROUS, SODIUM BICARBONATE, SODIUM CHLORIDE, POTASSIUM CHLORIDE 236; 22.74; 6.74; 5.86; 2.97 G/4L; G/4L; G/4L; G/4L; G/4L
4000 POWDER, FOR SOLUTION ORAL ONCE
Status: COMPLETED | OUTPATIENT
Start: 2023-06-26 | End: 2023-06-26

## 2023-06-26 RX ORDER — PANTOPRAZOLE SODIUM 40 MG/10ML
40 INJECTION, POWDER, LYOPHILIZED, FOR SOLUTION INTRAVENOUS 2 TIMES DAILY
Status: DISCONTINUED | OUTPATIENT
Start: 2023-06-26 | End: 2023-06-28

## 2023-06-26 RX ORDER — NOREPINEPHRINE BITARTRATE/D5W 4MG/250ML
PLASTIC BAG, INJECTION (ML) INTRAVENOUS
Status: COMPLETED
Start: 2023-06-26 | End: 2023-06-26

## 2023-06-26 RX ORDER — PROCHLORPERAZINE EDISYLATE 5 MG/ML
5 INJECTION INTRAMUSCULAR; INTRAVENOUS EVERY 6 HOURS PRN
Status: DISCONTINUED | OUTPATIENT
Start: 2023-06-26 | End: 2023-06-30 | Stop reason: HOSPADM

## 2023-06-26 RX ORDER — ACETAMINOPHEN 325 MG/1
650 TABLET ORAL EVERY 4 HOURS PRN
Status: DISCONTINUED | OUTPATIENT
Start: 2023-06-26 | End: 2023-06-30 | Stop reason: HOSPADM

## 2023-06-26 RX ADMIN — PANTOPRAZOLE SODIUM 40 MG: 40 INJECTION, POWDER, FOR SOLUTION INTRAVENOUS at 10:06

## 2023-06-26 RX ADMIN — Medication 0.02 MCG/KG/MIN: at 05:06

## 2023-06-26 RX ADMIN — NOREPINEPHRINE BITARTRATE 0.02 MCG/KG/MIN: 4 INJECTION, SOLUTION INTRAVENOUS at 05:06

## 2023-06-26 RX ADMIN — POLYETHYLENE GLYCOL 3350, SODIUM SULFATE ANHYDROUS, SODIUM BICARBONATE, SODIUM CHLORIDE, POTASSIUM CHLORIDE 4000 ML: 236; 22.74; 6.74; 5.86; 2.97 POWDER, FOR SOLUTION ORAL at 06:06

## 2023-06-26 RX ADMIN — IOHEXOL 60 ML: 300 INJECTION, SOLUTION INTRAVENOUS at 10:06

## 2023-06-26 RX ADMIN — ONDANSETRON 4 MG: 2 INJECTION INTRAMUSCULAR; INTRAVENOUS at 08:06

## 2023-06-26 RX ADMIN — GABAPENTIN 300 MG: 300 CAPSULE ORAL at 08:06

## 2023-06-26 RX ADMIN — PANTOPRAZOLE SODIUM 40 MG: 40 INJECTION, POWDER, FOR SOLUTION INTRAVENOUS at 08:06

## 2023-06-26 RX ADMIN — PANTOPRAZOLE SODIUM 80 MG: 40 INJECTION, POWDER, FOR SOLUTION INTRAVENOUS at 05:06

## 2023-06-26 RX ADMIN — FENTANYL CITRATE 25 MCG: 50 INJECTION, SOLUTION INTRAMUSCULAR; INTRAVENOUS at 09:06

## 2023-06-26 RX ADMIN — MIDAZOLAM HYDROCHLORIDE 1 MG: 1 INJECTION INTRAMUSCULAR; INTRAVENOUS at 09:06

## 2023-06-26 NOTE — CONSULTS
Consult Note  Interventional Radiology    Consult Requested By: Sonja Chau MD   Reason for Consult: GIB w/ active extrav on CTAP in distal discending to proximal sigmoid. Transfer discussed with Dr Chatterjee.     SUBJECTIVE:     Chief Complaint:  lower GIB    History of Present Illness:  Zara Gonzáles is a 89 y.o. male with a PMHx of CAD, GERD who was admitted on 6/26/23 for lower GI bleed. Patient initially presented to an OSH yesterday when he noticed BRBPR, was transfused 3 units PRBCs at OSH and transferred to [facility] for further management. Interventional Radiology has been consulted for IR angiogram and possible embolization. CTA done on admission revealed likely active GI bleed in the distal descending and proximal sigmoid colon. He denies any complaints on exam. Currently on ASA 81 mg. No plans for GI scope at this time     Review of Systems   Constitutional: Negative.    Respiratory: Negative.     Cardiovascular: Negative.    Gastrointestinal:  Positive for blood in stool.   Musculoskeletal: Negative.    Skin: Negative.    Neurological: Negative.      Scheduled Meds:   gabapentin  300 mg Oral BID    pantoprazole  40 mg Intravenous BID    pravastatin  40 mg Oral Daily     Continuous Infusions:  PRN Meds:sodium chloride, acetaminophen, ondansetron, prochlorperazine, sodium chloride 0.9%    Review of patient's allergies indicates:   Allergen Reactions    Sulfa (sulfonamide antibiotics)      rash       Past Medical History:   Diagnosis Date    GERD (gastroesophageal reflux disease)     Hyperlipidemia     Neuropathy      Past Surgical History:   Procedure Laterality Date    COLONOSCOPY N/A 5/22/2017    Procedure: COLONOSCOPY;  Surgeon: Javon Mendez MD;  Location: [facility];  Service: Endoscopy;  Laterality: N/A;    COLONOSCOPY N/A 4/3/2023    Procedure: COLONOSCOPY;  Surgeon: Javon Mendez MD;  Location: [facility];  Service: Endoscopy;  Laterality: N/A;    ESOPHAGOGASTRODUODENOSCOPY N/A 4/3/2023     Procedure: EGD (ESOPHAGOGASTRODUODENOSCOPY);  Surgeon: Javon Mendez MD;  Location: [facility];  Service: Endoscopy;  Laterality: N/A;    EYE SURGERY      TONSILLECTOMY, ADENOIDECTOMY       Family History   Problem Relation Age of Onset    Coronary artery disease Mother     Coronary artery disease Brother      Social History     Tobacco Use    Smoking status: Former     Types: Cigarettes     Quit date: 1973     Years since quittin.8    Smokeless tobacco: Never   Substance Use Topics    Alcohol use: No     Alcohol/week: 0.0 standard drinks    Drug use: No       OBJECTIVE:     Vital Signs (Most Recent)  Temp: 97.7 °F (36.5 °C) (23)  Pulse: 88 (23)  Resp: 14 (23)  BP: (!) 93/50 (23)  SpO2: 99 % (23)    Physical Exam:  Physical Exam  Constitutional:       Appearance: Normal appearance.   HENT:      Head: Normocephalic.   Cardiovascular:      Rate and Rhythm: Normal rate.   Pulmonary:      Effort: Pulmonary effort is normal.   Abdominal:      General: Abdomen is flat.   Neurological:      Mental Status: He is alert. Mental status is at baseline.   Psychiatric:         Mood and Affect: Mood normal.       Laboratory  I have reviewed all pertinent lab results within the past 24 hours.  CBC:   Recent Labs   Lab 23  0545   WBC 11.51   RBC 3.27*   HGB 8.7*   HCT 27.5*      MCV 84   MCH 26.6*   MCHC 31.6*     CMP:   Recent Labs   Lab 23  0545      CALCIUM 7.7*   ALBUMIN 2.7*   PROT 4.8*      K 4.1   CO2 20*      BUN 18   CREATININE 1.0   ALKPHOS 41*   ALT 8*   AST 14   BILITOT 1.3*     Coagulation:   Recent Labs   Lab 23  0056   LABPROT 11.6   INR 1.1   APTT 27.7       ASA/Mallampati  ASA: 2  Mallampati: 2    Imaging:  Reviewed    ASSESSMENT/PLAN:     Assessment:  89 y.o. male who presented to an OSH for BRBPR, CTA concerning for active GI bleed in the distal descending and proximal sigmoid colon. Referred  to IR  for angiogram and possible embolization. .     Plan:  Will proceed with an angiogram and possible embolization for mgmt of suspected active lower GI bleed today.   Sedation plan: up to moderate  Anticoagulation history reviewed.   Coagulation labs reviewed.  Thank you for the consult. Please contact with questions via [employer] secure chat or spectra link    Monika Nickerson PA-C  Interventional Radiology  6/26/2023  Spectra: [phone]

## 2023-06-26 NOTE — ANESTHESIA PREPROCEDURE EVALUATION
[facility]  Anesthesia Pre-Operative Evaluation         Patient Name: Zara Gonzáles  YOB: 1933  MRN: [id number]    SUBJECTIVE:     Pre-operative evaluation for Procedure(s) (LRB):  COLONOSCOPY (N/A)     06/26/2023    Zara Gonzáles is a 89 y.o. male w/ GERD, HLD, CAD, and neuropathy who presented to  [facility] with BRBPR. He was transferred to [facility] and presents for the above procedure.    Echo 6/13/23  · Overall the study quality was poor.  · The left ventricle is normal in size with concentric remodeling and normal systolic function.  · The estimated ejection fraction is 55 to 60%.  · LV walls not well visualized due to poor sonic windows, but no gross wall motion abnormalities noted.  · Normal left ventricular diastolic function.  · Normal right ventricular size with normal right ventricular systolic function.  · Mild aortic regurgitation.  · Moderate mitral regurgitation.  · Moderate tricuspid regurgitation.  · Mild pulmonic regurgitation.  · Normal central venous pressure (3 mmHg).  · The estimated PA systolic pressure is 30 mmHg.  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    Prev airway: None documented.     Patient Active Problem List   Diagnosis    Mixed hyperlipidemia    Gastroesophageal reflux disease without esophagitis    Normocytic anemia    B12 deficiency    Chest pain    Urinary frequency    ACP (advance care planning)    Abnormal stress test    PAC (premature atrial contraction)    GI bleed    Acute blood loss anemia    Hematochezia    Lower GI bleed    CAD (coronary artery disease)       Review of patient's allergies indicates:   Allergen Reactions    Sulfa (sulfonamide antibiotics)      rash       Current Inpatient Medications:   gabapentin  300 mg Oral BID    pantoprazole  40 mg Intravenous BID    polyethylene glycol  4,000 mL Oral Once    pravastatin  40 mg Oral Daily       No current facility-administered medications on file  prior to encounter.     Current Outpatient Medications on File Prior to Encounter   Medication Sig Dispense Refill    acetaminophen (TYLENOL) 500 MG tablet Take 1,000 mg by mouth every 6 (six) hours as needed (headache).      artificial tears (TEARS LUBRICANT EYE DROP) 0.5 % ophthalmic solution Place 1 drop into both eyes daily as needed (dry eye (s)).      aspirin (ECOTRIN) 81 MG EC tablet Take 1 tablet (81 mg total) by mouth once daily. 30 tablet 11    diltiaZEM (CARDIZEM) 60 MG tablet Take 1 tablet (60 mg total) by mouth every 12 (twelve) hours. 180 tablet 3    ferrous sulfate (FEOSOL) 325 mg (65 mg iron) Tab tablet Take 1 tablet (325 mg total) by mouth every Mon, Wed, Fri. 36 tablet 0    gabapentin (NEURONTIN) 300 MG capsule Take 1 capsule (300 mg total) by mouth 2 (two) times daily.      multivit-min/FA/lycopen/lutein (CENTRUM SILVER MEN ORAL) Take 1 tablet by mouth once daily.      nitroGLYCERIN (NITROSTAT) 0.4 MG SL tablet Place 1 tablet (0.4 mg total) under the tongue every 5 (five) minutes as needed for Chest pain. 30 tablet 0    pravastatin (PRAVACHOL) 40 MG tablet TAKE 1 TABLET(40 MG) BY MOUTH EVERY DAY 90 tablet 1       Past Surgical History:   Procedure Laterality Date    COLONOSCOPY N/A 2017    Procedure: COLONOSCOPY;  Surgeon: Javon Mendez MD;  [facility];  Service: Endoscopy;  Laterality: N/A;    COLONOSCOPY N/A 4/3/2023    Procedure: COLONOSCOPY;  Surgeon: Javon Mendez MD;  [facility];  Service: Endoscopy;  Laterality: N/A;    ESOPHAGOGASTRODUODENOSCOPY N/A 4/3/2023    Procedure: EGD (ESOPHAGOGASTRODUODENOSCOPY);  Surgeon: Javon Mendez MD;  [facility];  Service: Endoscopy;  Laterality: N/A;    EYE SURGERY      TONSILLECTOMY, ADENOIDECTOMY         Social History     Socioeconomic History    Marital status:    Tobacco Use    Smoking status: Former     Types: Cigarettes     Quit date: 1973     Years since quittin.8     Smokeless tobacco: Never   Substance and Sexual Activity    Alcohol use: No     Alcohol/week: 0.0 standard drinks    Drug use: No     Social Determinants of Health     Financial Resource Strain: Low Risk     Difficulty of Paying Living Expenses: Not hard at all   Food Insecurity: No Food Insecurity    Worried About Running Out of Food in the Last Year: Never true    Ran Out of Food in the Last Year: Never true   Transportation Needs: No Transportation Needs    Lack of Transportation (Medical): No    Lack of Transportation (Non-Medical): No   Physical Activity: Inactive    Days of Exercise per Week: 0 days    Minutes of Exercise per Session: 0 min   Stress: Stress Concern Present    Feeling of Stress : To some extent   Social Connections: Socially Isolated    Frequency of Communication with Friends and Family: More than three times a week    Frequency of Social Gatherings with Friends and Family: More than three times a week    Attends Mosque Services: Never    Active Member of Clubs or Organizations: No    Attends Club or Organization Meetings: Never    Marital Status:    Housing Stability: Low Risk     Unable to Pay for Housing in the Last Year: No    Number of Places Lived in the Last Year: 1    Unstable Housing in the Last Year: No       OBJECTIVE:     Vital Signs Range (Last 24H):  Temp:  [36.5 °C (97.7 °F)-36.9 °C (98.4 °F)]   Pulse:  []   Resp:  [10-33]   BP: ()/(48-86)   SpO2:  [91 %-100 %]       CBC:   Recent Labs     06/26/23  0545 06/26/23  1237   WBC 11.51 9.29   RBC 3.27* 3.29*   HGB 8.7* 9.0*   HCT 27.5* 27.8*    134*   MCV 84 85   MCH 26.6* 27.4   MCHC 31.6* 32.4       CMP:   Recent Labs     06/26/23  0045 06/26/23  0545    139   K 4.0 4.1   * 110   CO2 20* 20*   BUN 15 18   CREATININE 0.9 1.0   GLU 92 104   MG 1.8 1.9   PHOS 2.7 2.5*   CALCIUM 7.8* 7.7*   ALBUMIN 2.8* 2.7*   PROT 5.0* 4.8*   ALKPHOS 43* 41*   ALT 11 8*   AST 17 14    BILITOT 1.5* 1.3*       INR:  Recent Labs     06/25/23  1510 06/26/23  0056   INR 1.1 1.1   APTT  --  27.7       Diagnostic Studies: No relevant studies.    EKG:   Results for orders placed or performed during the hospital encounter of 04/02/23   EKG 12-lead    Collection Time: 04/02/23  4:50 PM    Narrative    Test Reason : R42,    Vent. Rate : 094 BPM     Atrial Rate : 094 BPM     P-R Int : 170 ms          QRS Dur : 104 ms      QT Int : 366 ms       P-R-T Axes : 042 -38 032 degrees     QTc Int : 457 ms    Sinus rhythm with Premature supraventricular complexes  Left axis deviation  Pulmonary disease pattern  Incomplete right bundle branch block  Minimal voltage criteria for LVH, may be normal variant ( R in aVL )  Abnormal ECG  When compared with ECG of 02-MAR-2023 14:53,  Incomplete right bundle branch block is now Present  Confirmed by Elizabeth VALENCIA, Rell MADDOX ([id number]) on 4/3/2023 8:38:12 PM    Referred By: [email or web address]   SELF           Confirmed By:Rell Johnson MD        2D ECHO:   Results for orders placed during the hospital encounter of 06/13/23    Echo    Interpretation Summary  · Overall the study quality was poor.  · The left ventricle is normal in size with concentric remodeling and normal systolic function.  · The estimated ejection fraction is 55 to 60%.  · LV walls not well visualized due to poor sonic windows, but no gross wall motion abnormalities noted.  · Normal left ventricular diastolic function.  · Normal right ventricular size with normal right ventricular systolic function.  · Mild aortic regurgitation.  · Moderate mitral regurgitation.  · Moderate tricuspid regurgitation.  · Mild pulmonic regurgitation.  · Normal central venous pressure (3 mmHg).  · The estimated PA systolic pressure is 30 mmHg.  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         ASSESSMENT/PLAN:                                                                                                                   Pre-op  Assessment    I have reviewed the Patient Summary Reports.     I have reviewed the Nursing Notes.    I have reviewed the Medications.     Review of Systems  Anesthesia Hx:  No problems with previous Anesthesia  History of prior surgery of interest to airway management or planning: Denies Family Hx of Anesthesia complications.   Denies Personal Hx of Anesthesia complications.   Social:  Non-Smoker, No Alcohol Use    Hematology/Oncology:         -- Denies Anemia: Denies Current/Recent Cancer   Cardiovascular:   Denies Hypertension. CAD    Denies Dysrhythmias.   Denies CHF. hyperlipidemia    Pulmonary:   Denies COPD.  Denies Asthma.  Denies Sleep Apnea.    Renal/:   Denies Chronic Renal Disease.     Hepatic/GI:   GERD Denies Liver Disease.    Musculoskeletal:   Denies Arthritis.     Neurological:   Denies CVA. Denies Neuromuscular Disease.  Denies Seizures.   Denies Chronic Pain Syndrome   Endocrine:   Denies Diabetes. Denies Hyperthyroidism.  Denies Obesity / BMI > 30  Psych:   denies anxiety denies depression          Physical Exam  General: Well nourished, Cooperative, Alert and Oriented    Airway:  Mallampati: II   Mouth Opening: Normal  TM Distance: Normal  Tongue: Normal  Neck ROM: Normal ROM    Dental:  Intact        Anesthesia Plan  Type of Anesthesia, risks & benefits discussed:    Anesthesia Type: MAC  Intra-op Monitoring Plan: Standard ASA Monitors  Post Op Pain Control Plan: multimodal analgesia and IV/PO Opioids PRN  Induction:  IV  Informed Consent: Informed consent signed with the Patient and all parties understand the risks and agree with anesthesia plan.  All questions answered.   ASA Score: 3  Day of Surgery Review of History & Physical: H&P Update referred to the surgeon/provider.    Ready For Surgery From Anesthesia Perspective.     .

## 2023-06-26 NOTE — CARE UPDATE
Pt went to IR for embolization. No active bleeding on angiogram. Returned to ICU hemodynamically stable to continue monitoring for signs of bleed. Reportedly started having BRBR and GI was notified. Continuing to monitor closely for hemodynamic instability. Obtaining large bore IV.     Judd Lyon MD  [facility]

## 2023-06-26 NOTE — ASSESSMENT & PLAN NOTE
89 year old male with CAD on daily ASA, HLD, GERD and neuropathy who presented to OSH with BRBPR hypotensive with SBP 90s. CTA with active distal descending and proximal sigmoid colon bleed. He was given 1g Rocephin, 1L IVF, and 3u PRBC and transfer requested to [facility] for IR intervention.     -- IR consulted. Appreciate their assistance   -- CBC q8h and trend   -- Maintain current type and screen   -- Transfuse for hgb < 7   -- Maintain large bore IV access   -- MAP > 65 goal. Not currently requiring vasopressor support

## 2023-06-26 NOTE — HPI
Mr. Gonzáles is am 89 year old male with PMH notable for GERN, HLD, CAD, and neuropathy who presented to  [facility] with BRBPR and associated dizziness. Per chart review patient reports multiple blood bowel movements and had multiple while in the OSH ED. He arrived hypotensive with SBP in the 90s. In the ED he was given IVF a 3u PRBC. His most recent hemoglobin prior to transfer was 8.6. CTA at OSH: There is new high attenuation material in the distal descending and proximal sigmoid colon consistent with an active lower GI bleed in the distal descending colon. Patient denies any anticoagulation but does take ASA daily.     Notably, he had a recent Upper GI series: WNL. Colonoscopy with diverticulosis and internal hemorrhoids. Critical care medicine at [facility] consulted with plan for IR intervention upon arrival. At the time of transfer he is hemodynamically stable without need for vasopressor support.

## 2023-06-26 NOTE — NURSING
Place patient on bed pan at 1705, called into room by patient at 1709 stating he didn't feel good and felt like he was going to vomit. Patient diaphoretic, pale, slurring speech, BP 50s/30s, other vitals remain wnl. Team called to bedside and placed orders for levophed, CBC, and pended 1uPRBCs. GI to bedside and states to initiate prep and plan to scope tomorrow.

## 2023-06-26 NOTE — CONSULTS
[facility]  Gastroenterology  Consult Note    Patient Name: Zara Gonzáles  MRN: [id number]  Admission Date: 6/26/2023  Hospital Length of Stay: 0 days  Code Status: Full Code   Attending Provider: Judd Lyon MD   Consulting Provider: Lilly Xavier MD  Primary Care Physician: Judd Honeycutt MD  Principal Problem:Lower GI bleed    Inpatient consult to Gastroenterology  Consult performed by: Lilly Xavier MD  Consult ordered by: Sonja Chau MD      Subjective:     HPI: Zara Gonzáles is a 89 y.o. male with history of GERD, HLD and neuropathy who presented as a transfer from [place] for concern of GI bleed.  He arrived to OSH hypotensive with SBP 90s. Per chart review he had multiple bloody bowel movements in ED and CTA was consistent with active lower GI bleed in the distal descending colon. Hemoglobin at that time was 8.1.  He was subsequently transfused 3 units PRBCs.  He was transferred to [facility] and admitted to ICU for IR intervention.    He reports having bloody diarrhea with 6 episodes of bright and dark colored stools for the past day.  He denies use of anticoagulation but takes ASA 81 mg daily.  He reports a previous history of GI bleed.  Symptoms are associated with fatigue and dizziness.    Most recent EGD in 4/23 within normal limits. Colonoscopy 4/23 revealed multiple diverticula throughout the entire colon and internal hemorrhoids.  Interventional Radiology on 6/26 did not find any evidence of active arterial extravasation and no embolization was performed.    Gastroenterology was consulted for lower GI bleed concerns    Past Medical History:   Diagnosis Date    GERD (gastroesophageal reflux disease)     Hyperlipidemia     Neuropathy        Past Surgical History:   Procedure Laterality Date    COLONOSCOPY N/A 5/22/2017    Procedure: COLONOSCOPY;  Surgeon: Javon Mendez MD;  Location: [facility];  Service: Endoscopy;  Laterality: N/A;    COLONOSCOPY N/A 4/3/2023     Procedure: COLONOSCOPY;  Surgeon: Javon Mendez MD;  Location: [facility];  Service: Endoscopy;  Laterality: N/A;    ESOPHAGOGASTRODUODENOSCOPY N/A 4/3/2023    Procedure: EGD (ESOPHAGOGASTRODUODENOSCOPY);  Surgeon: Javon Mendez MD;  Location: [facility];  Service: Endoscopy;  Laterality: N/A;    EYE SURGERY      TONSILLECTOMY, ADENOIDECTOMY         Family History   Problem Relation Age of Onset    Coronary artery disease Mother     Coronary artery disease Brother        Social History     Socioeconomic History    Marital status:    Tobacco Use    Smoking status: Former     Types: Cigarettes     Quit date: 1973     Years since quittin.8    Smokeless tobacco: Never   Substance and Sexual Activity    Alcohol use: No     Alcohol/week: 0.0 standard drinks    Drug use: No       Current Facility-Administered Medications on File Prior to Encounter   Medication Dose Route Frequency Provider Last Rate Last Admin    [COMPLETED] cefTRIAXone (ROCEPHIN) 1 g in dextrose 5 % in water (D5W) 5 % 100 mL IVPB (MB+)  1 g Intravenous ED 1 Time Johann Voss MD   Stopped at 23 1706    [COMPLETED] iohexoL (OMNIPAQUE 350) injection 80 mL  80 mL Intravenous ONCE PRN Johann Voss MD   80 mL at 23 1710    [COMPLETED] pantoprazole injection 80 mg  80 mg Intravenous ED 1 Time Johann Voss MD   80 mg at 23 1608    [COMPLETED] sodium chloride 0.9% bolus 1,000 mL 1,000 mL  1,000 mL Intravenous Once Johann Voss MD   Stopped at 23 1627    [DISCONTINUED] 0.9%  NaCl infusion (for blood administration)   Intravenous Q24H PRN Johann Voss MD        [DISCONTINUED] 0.9%  NaCl infusion (for blood administration)   Intravenous Q24H PRN Johann Voss MD        [DISCONTINUED] dextrose 50% injection 12.5 g  12.5 g Intravenous PRN Raoul Zeng MD        [DISCONTINUED] dextrose 50% injection 25 g  25 g Intravenous PRN Raoul Zeng MD        [DISCONTINUED] dextrose oral  liquid/gel 15 g  15 g Oral PRN Raoul Zeng MD        [DISCONTINUED] dextrose oral liquid/gel 30 g  30 g Oral PRN Raoul Zeng MD        [DISCONTINUED] glucagon (human recombinant) injection 1 mg  1 mg Intramuscular PRN Raoul Zeng MD        [DISCONTINUED] glucose chewable tablet 16 g  16 g Oral PRN Raoul Zeng MD        [DISCONTINUED] glucose chewable tablet 24 g  24 g Oral PRN Raoul Zeng MD        [DISCONTINUED] naloxone 0.4 mg/mL injection 0.02 mg  0.02 mg Intravenous PRN Raoul Zeng MD        [DISCONTINUED] sodium chloride 0.9% flush 10 mL  10 mL Intravenous Q12H PRN Raoul Zeng MD         Current Outpatient Medications on File Prior to Encounter   Medication Sig Dispense Refill    acetaminophen (TYLENOL) 500 MG tablet Take 1,000 mg by mouth every 6 (six) hours as needed (headache).      artificial tears (TEARS LUBRICANT EYE DROP) 0.5 % ophthalmic solution Place 1 drop into both eyes daily as needed (dry eye (s)).      aspirin (ECOTRIN) 81 MG EC tablet Take 1 tablet (81 mg total) by mouth once daily. 30 tablet 11    diltiaZEM (CARDIZEM) 60 MG tablet Take 1 tablet (60 mg total) by mouth every 12 (twelve) hours. 180 tablet 3    ferrous sulfate (FEOSOL) 325 mg (65 mg iron) Tab tablet Take 1 tablet (325 mg total) by mouth every Mon, Wed, Fri. 36 tablet 0    gabapentin (NEURONTIN) 300 MG capsule Take 1 capsule (300 mg total) by mouth 2 (two) times daily.      multivit-min/FA/lycopen/lutein (CENTRUM SILVER MEN ORAL) Take 1 tablet by mouth once daily.      nitroGLYCERIN (NITROSTAT) 0.4 MG SL tablet Place 1 tablet (0.4 mg total) under the tongue every 5 (five) minutes as needed for Chest pain. 30 tablet 0    pravastatin (PRAVACHOL) 40 MG tablet TAKE 1 TABLET(40 MG) BY MOUTH EVERY DAY 90 tablet 1       Review of patient's allergies indicates:   Allergen Reactions    Sulfa (sulfonamide antibiotics)      rash       Review of Systems   Constitutional:  Positive for malaise/fatigue. Negative for fever.    HENT:  Negative for sore throat.    Eyes:  Negative for pain.   Respiratory:  Negative for shortness of breath.    Cardiovascular:  Negative for chest pain.   Gastrointestinal:  Positive for blood in stool. Negative for vomiting.   Genitourinary:  Negative for dysuria.   Musculoskeletal:  Negative for myalgias.   Skin:  Negative for rash.   Neurological:  Negative for headaches.   Psychiatric/Behavioral:  The patient is not nervous/anxious.       Objective:     Vitals:    06/26/23 1015   BP: 130/85   Pulse: 85   Resp: 16   Temp:        Constitutional: NAD, pleasant elderly male patient   HENT: Normocephalic, atraumatic, no obvious deformities   Eyes: sclera non-icteric  Cardiovascular: no murmurs noted, S1/S2 noted   Respiratory: normal respiratory effort, CTAB  Abdominal: Bowel sounds appreciated in all quadrants  Skin: warm, dry  Neurological: Alert and oriented x 3  Rectal: deferred      Significant Labs:  Recent Labs   Lab 06/25/23  2051 06/26/23  0045 06/26/23  0545   HGB 8.6* 9.6* 8.7*       Lab Results   Component Value Date    WBC 11.51 06/26/2023    HGB 8.7 (L) 06/26/2023    HCT 27.5 (L) 06/26/2023    MCV 84 06/26/2023     06/26/2023       Lab Results   Component Value Date     06/26/2023    K 4.1 06/26/2023     06/26/2023    CO2 20 (L) 06/26/2023    BUN 18 06/26/2023    CREATININE 1.0 06/26/2023    CALCIUM 7.7 (L) 06/26/2023    ANIONGAP 9 06/26/2023    ESTGFRAFRICA >60.0 03/24/2022    EGFRNONAA >60.0 03/24/2022       Lab Results   Component Value Date    ALT 8 (L) 06/26/2023    AST 14 06/26/2023    ALKPHOS 41 (L) 06/26/2023    BILITOT 1.3 (H) 06/26/2023       Lab Results   Component Value Date    INR 1.1 06/26/2023    INR 1.1 06/25/2023    INR 1.1 04/02/2023       Significant Imaging:  Reviewed pertinent radiology findings.       Assessment/Plan:     Zara Gonzáles is a 89 y.o. male with history of GERD who presents with multiple episodes of bleeding per rectum and hypotension, now  status post transfusion of 3 units of blood    Problem List:    Bleeding per rectum - Likely diverticular    Patient with known diverticular disease and identified lower GI bleeding on CTA who presents with  continued bleeding per rectum unable to be identified on IR evaluation. Will need colonoscopy for further evaluation.       Recommendations:    - Plan for colonoscopy tomorrow  - Clear liquid diet and NPO at midnight   - Golytely prep to start at 6pm, to be completed within 4 hours if possible  - Monitor Hgb q8hrs  - Transfuse to keep Hgb >7, plts >50  - Hold anticoagulants if safe to do so per primary team  - If becomes hemodynamically unstable with associated large volume hematochezia, recommend STAT CTA and IR embolization if positive     Thank you for involving us in the care of Zara Gonzáles. Please call with any additional questions, concerns or changes in the patient's clinical status. We will continue to follow.    Lilly Xavier MD  Internal Medicine Resident, PGY-1  [facility]

## 2023-06-26 NOTE — PLAN OF CARE
Pt arrived to IR room 190 via bed on monitor w/ RN & PCT. AAO x4. Name//allergies/procedure verified. Pt will be monitored by RN @ bedside throughout procedure/sedation.

## 2023-06-26 NOTE — PROCEDURES
IR POST PROCEDURE NOTE    Date of Procedure: 6/26/2023    Procedure: Mesenteric angiogram    Pre-Procedure Diagnosis: Lower GI bleed    Post-Procedure Diagnosis: Same    Procedure Personnel: Shyam Roa MD and Melissa Chapman MD    Written Informed Consent Obtained: Yes    Specimen Removed: None    Estimated Blood Loss: Minimal    Findings: Mesenteric angiogram performed via SMA, DARLIN, as well as left colic and sigmoid branches. No evidence of active arterial extravasation. No embolization was performed. 6 Fr AngioSeal at right groin access site.    Complications: None immediate.    Plan: Care per ICU team. Recommend GI consultation. If patient re-bleeds, consider colonoscopy for further evaluation.       Melissa Chapman MD  Fellow, Dept. Of Interventional Radiology  [facility]

## 2023-06-26 NOTE — H&P
[facility] - Cardiac Medical ICU  Critical Care Medicine  History & Physical    Patient Name: Zara Gonzáles  MRN: [id number]  Admission Date: 6/26/2023  Hospital Length of Stay: 0 days  Code Status: Full Code  Attending Physician: Carlos Mirza MD   Primary Care Provider: Judd Honeycutt MD   Principal Problem: Lower GI bleed    Subjective:     HPI:  Mr. Gonzáles is am 89 year old male with PMH notable for GERN, HLD, CAD, and neuropathy who presented to  [facility] with BRBPR and associated dizziness. Per chart review patient reports multiple blood bowel movements and had multiple while in the OSH ED. He arrived hypotensive with SBP in the 90s. In the ED he was given IVF a 3u PRBC. His most recent hemoglobin prior to transfer was 8.6. CTA at OSH: There is new high attenuation material in the distal descending and proximal sigmoid colon consistent with an active lower GI bleed in the distal descending colon. Patient denies any anticoagulation but does take ASA daily.     Notably, he had a recent Upper GI series: WNL. Colonoscopy with diverticulosis and internal hemorrhoids. Critical care medicine at [facility] consulted with plan for IR intervention upon arrival. At the time of transfer he is hemodynamically stable without need for vasopressor support.         Hospital/ICU Course:  No notes on file     Past Medical History:   Diagnosis Date    GERD (gastroesophageal reflux disease)     Hyperlipidemia     Neuropathy        Past Surgical History:   Procedure Laterality Date    COLONOSCOPY N/A 5/22/2017    Procedure: COLONOSCOPY;  Surgeon: Javon Mendez MD;  Location: [facility];  Service: Endoscopy;  Laterality: N/A;    COLONOSCOPY N/A 4/3/2023    Procedure: COLONOSCOPY;  Surgeon: Javon Mendez MD;  Location: [facility];  Service: Endoscopy;  Laterality: N/A;    ESOPHAGOGASTRODUODENOSCOPY N/A 4/3/2023    Procedure: EGD (ESOPHAGOGASTRODUODENOSCOPY);  Surgeon: Javon Mendez MD;   Location: [facility];  Service: Endoscopy;  Laterality: N/A;    EYE SURGERY      TONSILLECTOMY, ADENOIDECTOMY         Review of patient's allergies indicates:   Allergen Reactions    Sulfa (sulfonamide antibiotics)      rash       Family History       Problem Relation (Age of Onset)    Coronary artery disease Mother, Brother          Tobacco Use    Smoking status: Former     Types: Cigarettes     Quit date: 1973     Years since quittin.8    Smokeless tobacco: Never   Substance and Sexual Activity    Alcohol use: No     Alcohol/week: 0.0 standard drinks    Drug use: No    Sexual activity: Not on file      Review of Systems   Constitutional:  Negative for chills, diaphoresis and fever.   HENT:  Negative for rhinorrhea, sneezing, sore throat and trouble swallowing.    Respiratory:  Negative for cough and shortness of breath.    Cardiovascular:  Negative for chest pain and palpitations.   Gastrointestinal:  Positive for anal bleeding and blood in stool. Negative for constipation, diarrhea, nausea and vomiting.   Genitourinary:  Negative for dysuria, frequency and urgency.   Musculoskeletal:  Negative for arthralgias and myalgias.   Skin:  Negative for rash and wound.   Neurological:  Negative for weakness and headaches.   Psychiatric/Behavioral:  Negative for agitation and confusion.    Objective:     Vital Signs (Most Recent):    Vital Signs (24h Range):  Temp:  [97.7 °F (36.5 °C)-98.4 °F (36.9 °C)] 98.3 °F (36.8 °C)  Pulse:  [] 84  Resp:  [10-20] 13  SpO2:  [98 %-100 %] 99 %  BP: ()/(48-86) 93/66      There is no height or weight on file to calculate BMI.      Intake/Output Summary (Last 24 hours) at 2023 0024  Last data filed at 2023  Gross per 24 hour   Intake 923.67 ml   Output --   Net 923.67 ml          Physical Exam  Constitutional:       Appearance: Normal appearance.   HENT:      Head: Normocephalic.   Eyes:      Extraocular Movements: Extraocular movements intact.       Pupils: Pupils are equal, round, and reactive to light.   Cardiovascular:      Rate and Rhythm: Normal rate and regular rhythm.      Pulses: Normal pulses.      Heart sounds: Normal heart sounds. No murmur heard.    No friction rub. No gallop.   Pulmonary:      Effort: Pulmonary effort is normal. No respiratory distress.      Breath sounds: No rales.   Chest:      Chest wall: No tenderness.   Abdominal:      General: Abdomen is flat.      Palpations: Abdomen is soft.      Tenderness: There is no abdominal tenderness. There is no guarding or rebound.   Musculoskeletal:      Cervical back: Normal range of motion and neck supple.      Right lower leg: No edema.      Left lower leg: No edema.   Skin:     General: Skin is warm and dry.   Neurological:      General: No focal deficit present.      Mental Status: He is alert and oriented to person, place, and time.          Vents:     Lines/Drains/Airways       Peripheral Intravenous Line  Duration                  Peripheral IV - Single Lumen 06/25/23 1507 20 G Right Antecubital <1 day         Peripheral IV - Single Lumen 06/25/23 1555 20 G Left Antecubital <1 day                  Significant Labs:    CBC/Anemia Profile:  Recent Labs   Lab 06/25/23  1510 06/25/23  1551 06/25/23  1752 06/25/23  2051   WBC 10.59  --   --  9.48   HGB 8.1*  --  7.8* 8.6*   HCT 26.5*  --   --  27.4*     --   --  175   MCV 78*  --   --  83   RDW 19.6*  --   --  19.1*   OCCULTBLOOD  --  Positive*  --   --         Chemistries:  Recent Labs   Lab 06/25/23  1510   *   K 4.1      CO2 25   BUN 15   CREATININE 1.15   CALCIUM 8.2*   ALBUMIN 3.7   PROT 6.6   BILITOT 0.4   ALKPHOS 56   ALT 21   AST 28       All pertinent labs within the past 24 hours have been reviewed.    Significant Imaging: I have reviewed all pertinent imaging results/findings within the past 24 hours.    Assessment/Plan:     Cardiac/Vascular  CAD (coronary artery disease)  -- Holding ASA given GIB at this  time. Will resume when clinically indicated   -- Continue home statin   -- SBP < 180 goal. Currently normotensive. Will hold home antihypertensives in setting of acute GIB    Mixed hyperlipidemia  -- Continue home statin therapy    Oncology  Acute blood loss anemia  Plan per GI Bleed    GI  * Lower GI bleed  89 year old male with CAD on daily ASA, HLD, GERD and neuropathy who presented to OSH with BRBPR hypotensive with SBP 90s. CTA with active distal descending and proximal sigmoid colon bleed. He was given 1g Rocephin, 1L IVF, and 3u PRBC and transfer requested to [facility] for IR intervention.     -- IR consulted. Appreciate their assistance   -- CBC q8h and trend   -- Maintain current type and screen   -- Transfuse for hgb < 7   -- Maintain large bore IV access   -- MAP > 65 goal. Not currently requiring vasopressor support      Critical Care Daily Checklist:    A: Awake: RASS Goal/Actual Goal:    Actual:     B: Spontaneous Breathing Trial Performed?     C: SAT & SBT Coordinated?  N/A                      D: Delirium: CAM-ICU Overall CAM-ICU: Negative   E: Early Mobility Performed? No   F: Feeding Goal:    Status:     Current Diet Order   Procedures    Diet NPO      AS: Analgesia/Sedation PRN    T: Thromboembolic Prophylaxis Holding in setting of GIB    H: HOB > 300 Yes   U: Stress Ulcer Prophylaxis (if needed)    G: Glucose Control < 180 goal    B: Bowel Function     I: Indwelling Catheter (Lines & Lora) Necessity PIV    D: De-escalation of Antimicrobials/Pharmacotherapies     Plan for the day/ETD Admit to ICU. IR consult    Code Status:  Family/Goals of Care: Full Code  At bedside      Critical Care Time: 45 minutes    Plan of care discussed with Dr. Mckeon. Attestation to follow.     Critical secondary to Patient has a condition that poses threat to life and bodily function: lower GI bleed     Critical care was time spent personally by me on the following activities: development of treatment plan with patient  or surrogate and bedside caregivers, discussions with consultants, evaluation of patient's response to treatment, examination of patient, ordering and performing treatments and interventions, ordering and review of laboratory studies, ordering and review of radiographic studies, pulse oximetry, re-evaluation of patient's condition. This critical care time did not overlap with that of any other provider or involve time for any procedures.     Louis Shah, NP  Critical Care Medicine  [facility] - Cardiac Medical [facility]

## 2023-06-26 NOTE — H&P (VIEW-ONLY)
[facility]  Gastroenterology  Consult Note    Patient Name: Zara Gonzáles  MRN: [id number]  Admission Date: 6/26/2023  Hospital Length of Stay: 0 days  Code Status: Full Code   Attending Provider: Judd Lyon MD   Consulting Provider: Lilly Xavier MD  Primary Care Physician: Judd Honeycutt MD  Principal Problem:Lower GI bleed    Inpatient consult to Gastroenterology  Consult performed by: Lilly Xavier MD  Consult ordered by: Sonja Chau MD      Subjective:     HPI: Zara Gonzáles is a 89 y.o. male with history of GERD, HLD and neuropathy who presented as a transfer from [place] for concern of GI bleed.  He arrived to OSH hypotensive with SBP 90s. Per chart review he had multiple bloody bowel movements in ED and CTA was consistent with active lower GI bleed in the distal descending colon. Hemoglobin at that time was 8.1.  He was subsequently transfused 3 units PRBCs.  He was transferred to [facility] and admitted to ICU for IR intervention.    He reports having bloody diarrhea with 6 episodes of bright and dark colored stools for the past day.  He denies use of anticoagulation but takes ASA 81 mg daily.  He reports a previous history of GI bleed.  Symptoms are associated with fatigue and dizziness.    Most recent EGD in 4/23 within normal limits. Colonoscopy 4/23 revealed multiple diverticula throughout the entire colon and internal hemorrhoids.  Interventional Radiology on 6/26 did not find any evidence of active arterial extravasation and no embolization was performed.    Gastroenterology was consulted for lower GI bleed concerns    Past Medical History:   Diagnosis Date    GERD (gastroesophageal reflux disease)     Hyperlipidemia     Neuropathy        Past Surgical History:   Procedure Laterality Date    COLONOSCOPY N/A 5/22/2017    Procedure: COLONOSCOPY;  Surgeon: Javon Mendez MD;  Location: [facility];  Service: Endoscopy;  Laterality: N/A;    COLONOSCOPY N/A 4/3/2023     Procedure: COLONOSCOPY;  Surgeon: Javon Mendez MD;  Location: [facility];  Service: Endoscopy;  Laterality: N/A;    ESOPHAGOGASTRODUODENOSCOPY N/A 4/3/2023    Procedure: EGD (ESOPHAGOGASTRODUODENOSCOPY);  Surgeon: Javon Mendez MD;  Location: [facility];  Service: Endoscopy;  Laterality: N/A;    EYE SURGERY      TONSILLECTOMY, ADENOIDECTOMY         Family History   Problem Relation Age of Onset    Coronary artery disease Mother     Coronary artery disease Brother        Social History     Socioeconomic History    Marital status:    Tobacco Use    Smoking status: Former     Types: Cigarettes     Quit date: 1973     Years since quittin.8    Smokeless tobacco: Never   Substance and Sexual Activity    Alcohol use: No     Alcohol/week: 0.0 standard drinks    Drug use: No       Current Facility-Administered Medications on File Prior to Encounter   Medication Dose Route Frequency Provider Last Rate Last Admin    [COMPLETED] cefTRIAXone (ROCEPHIN) 1 g in dextrose 5 % in water (D5W) 5 % 100 mL IVPB (MB+)  1 g Intravenous ED 1 Time Johann Voss MD   Stopped at 23 1706    [COMPLETED] iohexoL (OMNIPAQUE 350) injection 80 mL  80 mL Intravenous ONCE PRN Johann Voss MD   80 mL at 23 1710    [COMPLETED] pantoprazole injection 80 mg  80 mg Intravenous ED 1 Time Johann Voss MD   80 mg at 23 1608    [COMPLETED] sodium chloride 0.9% bolus 1,000 mL 1,000 mL  1,000 mL Intravenous Once Johann Voss MD   Stopped at 23 1627    [DISCONTINUED] 0.9%  NaCl infusion (for blood administration)   Intravenous Q24H PRN Johann Voss MD        [DISCONTINUED] 0.9%  NaCl infusion (for blood administration)   Intravenous Q24H PRN Johann Voss MD        [DISCONTINUED] dextrose 50% injection 12.5 g  12.5 g Intravenous PRN Raoul Zeng MD        [DISCONTINUED] dextrose 50% injection 25 g  25 g Intravenous PRN Raoul Zeng MD        [DISCONTINUED] dextrose oral  liquid/gel 15 g  15 g Oral PRN Raoul Zeng MD        [DISCONTINUED] dextrose oral liquid/gel 30 g  30 g Oral PRN Raoul eZng MD        [DISCONTINUED] glucagon (human recombinant) injection 1 mg  1 mg Intramuscular PRN Raoul Zeng MD        [DISCONTINUED] glucose chewable tablet 16 g  16 g Oral PRN Raoul Zeng MD        [DISCONTINUED] glucose chewable tablet 24 g  24 g Oral PRN Raoul Zeng MD        [DISCONTINUED] naloxone 0.4 mg/mL injection 0.02 mg  0.02 mg Intravenous PRN Raoul Zeng MD        [DISCONTINUED] sodium chloride 0.9% flush 10 mL  10 mL Intravenous Q12H PRN Raoul Zeng MD         Current Outpatient Medications on File Prior to Encounter   Medication Sig Dispense Refill    acetaminophen (TYLENOL) 500 MG tablet Take 1,000 mg by mouth every 6 (six) hours as needed (headache).      artificial tears (TEARS LUBRICANT EYE DROP) 0.5 % ophthalmic solution Place 1 drop into both eyes daily as needed (dry eye (s)).      aspirin (ECOTRIN) 81 MG EC tablet Take 1 tablet (81 mg total) by mouth once daily. 30 tablet 11    diltiaZEM (CARDIZEM) 60 MG tablet Take 1 tablet (60 mg total) by mouth every 12 (twelve) hours. 180 tablet 3    ferrous sulfate (FEOSOL) 325 mg (65 mg iron) Tab tablet Take 1 tablet (325 mg total) by mouth every Mon, Wed, Fri. 36 tablet 0    gabapentin (NEURONTIN) 300 MG capsule Take 1 capsule (300 mg total) by mouth 2 (two) times daily.      multivit-min/FA/lycopen/lutein (CENTRUM SILVER MEN ORAL) Take 1 tablet by mouth once daily.      nitroGLYCERIN (NITROSTAT) 0.4 MG SL tablet Place 1 tablet (0.4 mg total) under the tongue every 5 (five) minutes as needed for Chest pain. 30 tablet 0    pravastatin (PRAVACHOL) 40 MG tablet TAKE 1 TABLET(40 MG) BY MOUTH EVERY DAY 90 tablet 1       Review of patient's allergies indicates:   Allergen Reactions    Sulfa (sulfonamide antibiotics)      rash       Review of Systems   Constitutional:  Positive for malaise/fatigue. Negative for fever.    HENT:  Negative for sore throat.    Eyes:  Negative for pain.   Respiratory:  Negative for shortness of breath.    Cardiovascular:  Negative for chest pain.   Gastrointestinal:  Positive for blood in stool. Negative for vomiting.   Genitourinary:  Negative for dysuria.   Musculoskeletal:  Negative for myalgias.   Skin:  Negative for rash.   Neurological:  Negative for headaches.   Psychiatric/Behavioral:  The patient is not nervous/anxious.       Objective:     Vitals:    06/26/23 1015   BP: 130/85   Pulse: 85   Resp: 16   Temp:        Constitutional: NAD, pleasant elderly male patient   HENT: Normocephalic, atraumatic, no obvious deformities   Eyes: sclera non-icteric  Cardiovascular: no murmurs noted, S1/S2 noted   Respiratory: normal respiratory effort, CTAB  Abdominal: Bowel sounds appreciated in all quadrants  Skin: warm, dry  Neurological: Alert and oriented x 3  Rectal: deferred      Significant Labs:  Recent Labs   Lab 06/25/23  2051 06/26/23  0045 06/26/23  0545   HGB 8.6* 9.6* 8.7*       Lab Results   Component Value Date    WBC 11.51 06/26/2023    HGB 8.7 (L) 06/26/2023    HCT 27.5 (L) 06/26/2023    MCV 84 06/26/2023     06/26/2023       Lab Results   Component Value Date     06/26/2023    K 4.1 06/26/2023     06/26/2023    CO2 20 (L) 06/26/2023    BUN 18 06/26/2023    CREATININE 1.0 06/26/2023    CALCIUM 7.7 (L) 06/26/2023    ANIONGAP 9 06/26/2023    ESTGFRAFRICA >60.0 03/24/2022    EGFRNONAA >60.0 03/24/2022       Lab Results   Component Value Date    ALT 8 (L) 06/26/2023    AST 14 06/26/2023    ALKPHOS 41 (L) 06/26/2023    BILITOT 1.3 (H) 06/26/2023       Lab Results   Component Value Date    INR 1.1 06/26/2023    INR 1.1 06/25/2023    INR 1.1 04/02/2023       Significant Imaging:  Reviewed pertinent radiology findings.       Assessment/Plan:     Zara Gonzáles is a 89 y.o. male with history of GERD who presents with multiple episodes of bleeding per rectum and hypotension, now  status post transfusion of 3 units of blood    Problem List:    Bleeding per rectum - Likely diverticular    Patient with known diverticular disease and identified lower GI bleeding on CTA who presents with  continued bleeding per rectum unable to be identified on IR evaluation. Will need colonoscopy for further evaluation.       Recommendations:    - Plan for colonoscopy tomorrow  - Clear liquid diet and NPO at midnight   - Golytely prep to start at 6pm, to be completed within 4 hours if possible  - Monitor Hgb q8hrs  - Transfuse to keep Hgb >7, plts >50  - Hold anticoagulants if safe to do so per primary team  - If becomes hemodynamically unstable with associated large volume hematochezia, recommend STAT CTA and IR embolization if positive     Thank you for involving us in the care of Zara Gonzáles. Please call with any additional questions, concerns or changes in the patient's clinical status. We will continue to follow.    Lilly Xavier MD  Internal Medicine Resident, PGY-1  [facility]

## 2023-06-26 NOTE — ASSESSMENT & PLAN NOTE
-- Holding ASA given GIB at this time. Will resume when clinically indicated   -- Continue home statin   -- SBP < 180 goal. Currently normotensive. Will hold home antihypertensives in setting of acute GIB

## 2023-06-26 NOTE — NURSING
Returned to 6085 from IR, patient stable, vitals WNL. Knee immobilizer in place on R leg, dressing to groin site is clean, dry and intact, DP and PT pulses +2 bilaterally. Patient supine until 12:20.  MD spoke with patient once case was complete to update with findings. Called patients daughter, Vivienne, to give update per patients request.

## 2023-06-26 NOTE — PLAN OF CARE
[facility] - Cardiac Medical ICU  Initial Discharge Assessment       Primary Care Provider: Judd Honeycutt MD    Admission Diagnosis: GI (gastrointestinal bleed) [K92.2]    Admission Date: 6/26/2023  Expected Discharge Date: 6/30/2023    Transition of Care Barriers: None    Payor: MEDICARE / Plan: MEDICARE PART A & B / Product Type: Government /     Extended Emergency Contact Information  Primary Emergency Contact: Vivienne Portillo   [place]  Mobile Phone: [phone]  Relation: Daughter    Discharge Plan A: Home  Discharge Plan B: Home, Home Health      [id number] - [address]  [facility] [address]  Phone: [phone] Fax: [phone]    [facility]. - [facility] [address] [facility] [place] [facility]\""  [address] [facility] [address]  Phone: [phone] Fax: [phone]      Transferred from:     Past Medical History:   Diagnosis Date    GERD (gastroesophageal reflux disease)     Hyperlipidemia     Neuropathy          CM met with patient and Vivienne Portillo (daughter) [phone]  in room for Discharge Planning Assessment.  Patient was able to answer questions.  Per patient, he lives alone in a 1-story home with 1 step(s) to enter.   Per patient, he was independent with ADLS and used no DME for ambulation. Per patient, he is not on dialysis and does not take Coumadin.  Patient will have help from Vivienne Portillo (daughter) [phone]  upon discharge.   Discharge Planning Booklet given to patient/family and discussed.  All questions addressed.  CM will follow for needs.      Initial Assessment (most recent)       Adult Discharge Assessment - 06/26/23 1441          Discharge Assessment    Assessment Type Discharge Planning Assessment     Confirmed/corrected address, phone number and insurance Yes     Confirmed Demographics Correct on Facesheet     Source of Information  patient     When was your last doctors appointment? 06/19/23     Communicated MALCOLM with patient/caregiver Date not available/Unable to determine     Reason For Admission Lower GI bleed     People in Home alone     Facility Arrived From: Home     Do you expect to return to your current living situation? Yes     Do you have help at home or someone to help you manage your care at home? Yes     Who are your caregiver(s) and their phone number(s)? Vivienne Portillo (daughter) [phone]     Prior to hospitilization cognitive status: Alert/Oriented     Current cognitive status: Alert/Oriented     Equipment Currently Used at Home none     Readmission within 30 days? No     Patient currently being followed by outpatient case management? No     Do you currently have service(s) that help you manage your care at home? No     Do you take prescription medications? Yes     Do you have prescription coverage? Yes     Coverage Medicare  Payor:   MEDICARE - MEDICARE PART A & B     Do you have any problems affording any of your prescribed medications? No     Is the patient taking medications as prescribed? yes     Who is going to help you get home at discharge? Vivienne Portillo (daughter) [phone]     How do you get to doctors appointments? car, drives self     Are you on dialysis? No     Do you take coumadin? No     Discharge Plan A Home     Discharge Plan B Home;Home Health     DME Needed Upon Discharge  other (see comments)   TBD    Discharge Plan discussed with: Patient;Adult children     Transition of Care Barriers None        Physical Activity    On average, how many days per week do you engage in moderate to strenuous exercise (like a brisk walk)? 0 days     On average, how many minutes do you engage in exercise at this level? 0 min        Financial Resource Strain    How hard is it for you to pay for the very basics like food, housing, medical care, and heating? Not hard at all        Housing Stability    In the last 12 months, was  there a time when you were not able to pay the mortgage or rent on time? No     In the last 12 months, how many places have you lived? 1     In the last 12 months, was there a time when you did not have a steady place to sleep or slept in a shelter (including now)? No        Transportation Needs    In the past 12 months, has lack of transportation kept you from medical appointments or from getting medications? No     In the past 12 months, has lack of transportation kept you from meetings, work, or from getting things needed for daily living? No        Food Insecurity    Within the past 12 months, you worried that your food would run out before you got the money to buy more. Never true     Within the past 12 months, the food you bought just didn't last and you didn't have money to get more. Never true        Stress    Do you feel stress - tense, restless, nervous, or anxious, or unable to sleep at night because your mind is troubled all the time - these days? To some extent        Social Connections    In a typical week, how many times do you talk on the phone with family, friends, or neighbors? More than three times a week     How often do you get together with friends or relatives? More than three times a week     How often do you attend Druze or Taoism services? Never     Do you belong to any clubs or organizations such as Druze groups, unions, fraternal or athletic groups, or school groups? No     How often do you attend meetings of the clubs or organizations you belong to? Never     Are you , , , , never , or living with a partner?         Alcohol Use    Q1: How often do you have a drink containing alcohol? Never     Q2: How many drinks containing alcohol do you have on a typical day when you are drinking? Patient does not drink     Q3: How often do you have six or more drinks on one occasion? Never                            PCP:  Judd Honeycutt,  MD  [phone]        Pharmacy:    [id number] - [place] [address] [facility]  [address] [facility] [address]  [facility] [address]  Phone: [phone] Fax: [phone]    [facility].Emp.[facility]. - [place] [address] [place] [address] [facility] [address]  Phone: [phone] Fax: [phone]        Emergency Contacts:  Extended Emergency Contact Information  Primary Emergency Contact: Vivienne Portillo   [place]  Mobile Phone: [phone]  Relation: Daughter      Insurance:    Payor: MEDICARE / Plan: MEDICARE PART A & B / Product Type: Government /     Kinsey Bourgeois RN     [phone]      06/26/2023  2:54 PM

## 2023-06-26 NOTE — SUBJECTIVE & OBJECTIVE
Past Medical History:   Diagnosis Date    GERD (gastroesophageal reflux disease)     Hyperlipidemia     Neuropathy        Past Surgical History:   Procedure Laterality Date    COLONOSCOPY N/A 2017    Procedure: COLONOSCOPY;  Surgeon: Javon Mendez MD;  Location: [facility];  Service: Endoscopy;  Laterality: N/A;    COLONOSCOPY N/A 4/3/2023    Procedure: COLONOSCOPY;  Surgeon: Javon Mendez MD;  [facility]: [facility];  Service: Endoscopy;  Laterality: N/A;    ESOPHAGOGASTRODUODENOSCOPY N/A 4/3/2023    Procedure: EGD (ESOPHAGOGASTRODUODENOSCOPY);  Surgeon: Javon Mendez MD;  Location: [facility];  Service: Endoscopy;  Laterality: N/A;    EYE SURGERY      TONSILLECTOMY, ADENOIDECTOMY         Review of patient's allergies indicates:   Allergen Reactions    Sulfa (sulfonamide antibiotics)      rash       Family History       Problem Relation (Age of Onset)    Coronary artery disease Mother, Brother          Tobacco Use    Smoking status: Former     Types: Cigarettes     Quit date: 1973     Years since quittin.8    Smokeless tobacco: Never   Substance and Sexual Activity    Alcohol use: No     Alcohol/week: 0.0 standard drinks    Drug use: No    Sexual activity: Not on file      Review of Systems   Constitutional:  Negative for chills, diaphoresis and fever.   HENT:  Negative for rhinorrhea, sneezing, sore throat and trouble swallowing.    Respiratory:  Negative for cough and shortness of breath.    Cardiovascular:  Negative for chest pain and palpitations.   Gastrointestinal:  Positive for anal bleeding and blood in stool. Negative for constipation, diarrhea, nausea and vomiting.   Genitourinary:  Negative for dysuria, frequency and urgency.   Musculoskeletal:  Negative for arthralgias and myalgias.   Skin:  Negative for rash and wound.   Neurological:  Negative for weakness and headaches.   Psychiatric/Behavioral:  Negative for agitation and confusion.    Objective:     Vital Signs (Most  Recent):    Vital Signs (24h Range):  Temp:  [97.7 °F (36.5 °C)-98.4 °F (36.9 °C)] 98.3 °F (36.8 °C)  Pulse:  [] 84  Resp:  [10-20] 13  SpO2:  [98 %-100 %] 99 %  BP: ()/(48-86) 93/66      There is no height or weight on file to calculate BMI.      Intake/Output Summary (Last 24 hours) at 6/26/2023 0024  Last data filed at 6/25/2023 2043  Gross per 24 hour   Intake 923.67 ml   Output --   Net 923.67 ml          Physical Exam  Constitutional:       Appearance: Normal appearance.   HENT:      Head: Normocephalic.   Eyes:      Extraocular Movements: Extraocular movements intact.      Pupils: Pupils are equal, round, and reactive to light.   Cardiovascular:      Rate and Rhythm: Normal rate and regular rhythm.      Pulses: Normal pulses.      Heart sounds: Normal heart sounds. No murmur heard.    No friction rub. No gallop.   Pulmonary:      Effort: Pulmonary effort is normal. No respiratory distress.      Breath sounds: No rales.   Chest:      Chest wall: No tenderness.   Abdominal:      General: Abdomen is flat.      Palpations: Abdomen is soft.      Tenderness: There is no abdominal tenderness. There is no guarding or rebound.   Musculoskeletal:      Cervical back: Normal range of motion and neck supple.      Right lower leg: No edema.      Left lower leg: No edema.   Skin:     General: Skin is warm and dry.   Neurological:      General: No focal deficit present.      Mental Status: He is alert and oriented to person, place, and time.          Vents:     Lines/Drains/Airways       Peripheral Intravenous Line  Duration                  Peripheral IV - Single Lumen 06/25/23 1507 20 G Right Antecubital <1 day         Peripheral IV - Single Lumen 06/25/23 1555 20 G Left Antecubital <1 day                  Significant Labs:    CBC/Anemia Profile:  Recent Labs   Lab 06/25/23  1510 06/25/23  1551 06/25/23  1752 06/25/23  2051   WBC 10.59  --   --  9.48   HGB 8.1*  --  7.8* 8.6*   HCT 26.5*  --   --  27.4*   PLT  225  --   --  175   MCV 78*  --   --  83   RDW 19.6*  --   --  19.1*   OCCULTBLOOD  --  Positive*  --   --         Chemistries:  Recent Labs   Lab 06/25/23  1510   *   K 4.1      CO2 25   BUN 15   CREATININE 1.15   CALCIUM 8.2*   ALBUMIN 3.7   PROT 6.6   BILITOT 0.4   ALKPHOS 56   ALT 21   AST 28       All pertinent labs within the past 24 hours have been reviewed.    Significant Imaging: I have reviewed all pertinent imaging results/findings within the past 24 hours.

## 2023-06-26 NOTE — H&P
Please see IR consult note dated 6/26/2023    Monika Nickerson PA-C  Interventional Radiology  Spectra [id number]  6/26/2023

## 2023-06-27 ENCOUNTER — ANESTHESIA (OUTPATIENT)
Dept: ENDOSCOPY | Facility: HOSPITAL | Age: 88
DRG: 377 | End: 2023-06-27
Payer: MEDICARE

## 2023-06-27 VITALS
OXYGEN SATURATION: 100 % | RESPIRATION RATE: 14 BRPM | HEART RATE: 78 BPM | DIASTOLIC BLOOD PRESSURE: 53 MMHG | SYSTOLIC BLOOD PRESSURE: 92 MMHG

## 2023-06-27 LAB
ALBUMIN SERPL BCP-MCNC: 2.7 G/DL (ref 3.5–5.2)
ALP SERPL-CCNC: 40 U/L (ref 55–135)
ALT SERPL W/O P-5'-P-CCNC: 10 U/L (ref 10–44)
ANION GAP SERPL CALC-SCNC: 11 MMOL/L (ref 8–16)
ANISOCYTOSIS BLD QL SMEAR: SLIGHT
AST SERPL-CCNC: 17 U/L (ref 10–40)
BASOPHILS # BLD AUTO: 0.02 K/UL (ref 0–0.2)
BASOPHILS # BLD AUTO: 0.03 K/UL (ref 0–0.2)
BASOPHILS # BLD AUTO: 0.03 K/UL (ref 0–0.2)
BASOPHILS NFR BLD: 0.2 % (ref 0–1.9)
BASOPHILS NFR BLD: 0.2 % (ref 0–1.9)
BASOPHILS NFR BLD: 0.3 % (ref 0–1.9)
BILIRUB SERPL-MCNC: 1 MG/DL (ref 0.1–1)
BLD PROD TYP BPU: NORMAL
BLOOD UNIT EXPIRATION DATE: NORMAL
BLOOD UNIT TYPE CODE: 5100
BLOOD UNIT TYPE: NORMAL
BUN SERPL-MCNC: 22 MG/DL (ref 8–23)
CALCIUM SERPL-MCNC: 7.5 MG/DL (ref 8.7–10.5)
CHLORIDE SERPL-SCNC: 112 MMOL/L (ref 95–110)
CO2 SERPL-SCNC: 20 MMOL/L (ref 23–29)
CODING SYSTEM: NORMAL
CREAT SERPL-MCNC: 1 MG/DL (ref 0.5–1.4)
CROSSMATCH INTERPRETATION: NORMAL
DIFFERENTIAL METHOD: ABNORMAL
DISPENSE STATUS: NORMAL
EOSINOPHIL # BLD AUTO: 0 K/UL (ref 0–0.5)
EOSINOPHIL # BLD AUTO: 0 K/UL (ref 0–0.5)
EOSINOPHIL # BLD AUTO: 0.1 K/UL (ref 0–0.5)
EOSINOPHIL NFR BLD: 0.1 % (ref 0–8)
EOSINOPHIL NFR BLD: 0.3 % (ref 0–8)
EOSINOPHIL NFR BLD: 1.1 % (ref 0–8)
ERYTHROCYTE [DISTWIDTH] IN BLOOD BY AUTOMATED COUNT: 17.7 % (ref 11.5–14.5)
ERYTHROCYTE [DISTWIDTH] IN BLOOD BY AUTOMATED COUNT: 18.1 % (ref 11.5–14.5)
ERYTHROCYTE [DISTWIDTH] IN BLOOD BY AUTOMATED COUNT: 18.8 % (ref 11.5–14.5)
EST. GFR  (NO RACE VARIABLE): >60 ML/MIN/1.73 M^2
GIANT PLATELETS BLD QL SMEAR: PRESENT
GLUCOSE SERPL-MCNC: 107 MG/DL (ref 70–110)
HCT VFR BLD AUTO: 16.5 % (ref 40–54)
HCT VFR BLD AUTO: 21.8 % (ref 40–54)
HCT VFR BLD AUTO: 22.4 % (ref 40–54)
HGB BLD-MCNC: 5.3 G/DL (ref 14–18)
HGB BLD-MCNC: 7.1 G/DL (ref 14–18)
HGB BLD-MCNC: 7.4 G/DL (ref 14–18)
IMM GRANULOCYTES # BLD AUTO: 0.05 K/UL (ref 0–0.04)
IMM GRANULOCYTES # BLD AUTO: 0.06 K/UL (ref 0–0.04)
IMM GRANULOCYTES # BLD AUTO: 0.06 K/UL (ref 0–0.04)
IMM GRANULOCYTES NFR BLD AUTO: 0.4 % (ref 0–0.5)
IMM GRANULOCYTES NFR BLD AUTO: 0.5 % (ref 0–0.5)
IMM GRANULOCYTES NFR BLD AUTO: 0.6 % (ref 0–0.5)
LYMPHOCYTES # BLD AUTO: 0.9 K/UL (ref 1–4.8)
LYMPHOCYTES # BLD AUTO: 1.3 K/UL (ref 1–4.8)
LYMPHOCYTES # BLD AUTO: 1.4 K/UL (ref 1–4.8)
LYMPHOCYTES NFR BLD: 11.4 % (ref 18–48)
LYMPHOCYTES NFR BLD: 11.4 % (ref 18–48)
LYMPHOCYTES NFR BLD: 8.7 % (ref 18–48)
MAGNESIUM SERPL-MCNC: 1.8 MG/DL (ref 1.6–2.6)
MCH RBC QN AUTO: 26.8 PG (ref 27–31)
MCH RBC QN AUTO: 26.9 PG (ref 27–31)
MCH RBC QN AUTO: 27.3 PG (ref 27–31)
MCHC RBC AUTO-ENTMCNC: 32.1 G/DL (ref 32–36)
MCHC RBC AUTO-ENTMCNC: 32.6 G/DL (ref 32–36)
MCHC RBC AUTO-ENTMCNC: 33 G/DL (ref 32–36)
MCV RBC AUTO: 82 FL (ref 82–98)
MCV RBC AUTO: 83 FL (ref 82–98)
MCV RBC AUTO: 84 FL (ref 82–98)
MONOCYTES # BLD AUTO: 1.8 K/UL (ref 0.3–1)
MONOCYTES # BLD AUTO: 2.2 K/UL (ref 0.3–1)
MONOCYTES # BLD AUTO: 3.3 K/UL (ref 0.3–1)
MONOCYTES NFR BLD: 17.7 % (ref 4–15)
MONOCYTES NFR BLD: 19 % (ref 4–15)
MONOCYTES NFR BLD: 26.6 % (ref 4–15)
NEUTROPHILS # BLD AUTO: 7.4 K/UL (ref 1.8–7.7)
NEUTROPHILS # BLD AUTO: 7.6 K/UL (ref 1.8–7.7)
NEUTROPHILS # BLD AUTO: 8.1 K/UL (ref 1.8–7.7)
NEUTROPHILS NFR BLD: 60.2 % (ref 38–73)
NEUTROPHILS NFR BLD: 68.8 % (ref 38–73)
NEUTROPHILS NFR BLD: 72.5 % (ref 38–73)
NRBC BLD-RTO: 0 /100 WBC
NUM UNITS TRANS FFP: NORMAL
NUM UNITS TRANS FFP: NORMAL
NUM UNITS TRANS PACKED RBC: NORMAL
NUM UNITS TRANS PACKED RBC: NORMAL
OVALOCYTES BLD QL SMEAR: ABNORMAL
PHOSPHATE SERPL-MCNC: 3.2 MG/DL (ref 2.7–4.5)
PLATELET # BLD AUTO: 105 K/UL (ref 150–450)
PLATELET # BLD AUTO: 109 K/UL (ref 150–450)
PLATELET # BLD AUTO: 123 K/UL (ref 150–450)
PLATELET BLD QL SMEAR: ABNORMAL
PMV BLD AUTO: 10.9 FL (ref 9.2–12.9)
PMV BLD AUTO: 11.3 FL (ref 9.2–12.9)
PMV BLD AUTO: 11.6 FL (ref 9.2–12.9)
POIKILOCYTOSIS BLD QL SMEAR: SLIGHT
POTASSIUM SERPL-SCNC: 3.9 MMOL/L (ref 3.5–5.1)
PROT SERPL-MCNC: 4.6 G/DL (ref 6–8.4)
RBC # BLD AUTO: 1.97 M/UL (ref 4.6–6.2)
RBC # BLD AUTO: 2.65 M/UL (ref 4.6–6.2)
RBC # BLD AUTO: 2.71 M/UL (ref 4.6–6.2)
SODIUM SERPL-SCNC: 143 MMOL/L (ref 136–145)
SPHEROCYTES BLD QL SMEAR: ABNORMAL
WBC # BLD AUTO: 10.42 K/UL (ref 3.9–12.7)
WBC # BLD AUTO: 11.79 K/UL (ref 3.9–12.7)
WBC # BLD AUTO: 12.32 K/UL (ref 3.9–12.7)

## 2023-06-27 PROCEDURE — 99291 CRITICAL CARE FIRST HOUR: CPT | Mod: ,,, | Performed by: INTERNAL MEDICINE

## 2023-06-27 PROCEDURE — D9220A PRA ANESTHESIA: Mod: ANES,,, | Performed by: STUDENT IN AN ORGANIZED HEALTH CARE EDUCATION/TRAINING PROGRAM

## 2023-06-27 PROCEDURE — 45378 DIAGNOSTIC COLONOSCOPY: CPT | Performed by: INTERNAL MEDICINE

## 2023-06-27 PROCEDURE — P9016 RBC LEUKOCYTES REDUCED: HCPCS | Performed by: NURSE PRACTITIONER

## 2023-06-27 PROCEDURE — C9113 INJ PANTOPRAZOLE SODIUM, VIA: HCPCS

## 2023-06-27 PROCEDURE — 84100 ASSAY OF PHOSPHORUS: CPT

## 2023-06-27 PROCEDURE — P9017 PLASMA 1 DONOR FRZ W/IN 8 HR: HCPCS | Performed by: NURSE PRACTITIONER

## 2023-06-27 PROCEDURE — 25000003 PHARM REV CODE 250: Performed by: NURSE ANESTHETIST, CERTIFIED REGISTERED

## 2023-06-27 PROCEDURE — 63600175 PHARM REV CODE 636 W HCPCS

## 2023-06-27 PROCEDURE — 20000000 HC ICU ROOM

## 2023-06-27 PROCEDURE — 37000009 HC ANESTHESIA EA ADD 15 MINS: Performed by: INTERNAL MEDICINE

## 2023-06-27 PROCEDURE — 80053 COMPREHEN METABOLIC PANEL: CPT

## 2023-06-27 PROCEDURE — 25000003 PHARM REV CODE 250: Performed by: NURSE PRACTITIONER

## 2023-06-27 PROCEDURE — 85025 COMPLETE CBC W/AUTO DIFF WBC: CPT | Mod: 91 | Performed by: NURSE PRACTITIONER

## 2023-06-27 PROCEDURE — 99291 PR CRITICAL CARE, E/M 30-74 MINUTES: ICD-10-PCS | Mod: ,,, | Performed by: INTERNAL MEDICINE

## 2023-06-27 PROCEDURE — 83735 ASSAY OF MAGNESIUM: CPT

## 2023-06-27 PROCEDURE — 45378 PR COLONOSCOPY,DIAGNOSTIC: ICD-10-PCS | Mod: 22,,, | Performed by: INTERNAL MEDICINE

## 2023-06-27 PROCEDURE — D9220A PRA ANESTHESIA: ICD-10-PCS | Mod: CRNA,,, | Performed by: NURSE ANESTHETIST, CERTIFIED REGISTERED

## 2023-06-27 PROCEDURE — D9220A PRA ANESTHESIA: ICD-10-PCS | Mod: ANES,,, | Performed by: STUDENT IN AN ORGANIZED HEALTH CARE EDUCATION/TRAINING PROGRAM

## 2023-06-27 PROCEDURE — 63600175 PHARM REV CODE 636 W HCPCS: Performed by: NURSE ANESTHETIST, CERTIFIED REGISTERED

## 2023-06-27 PROCEDURE — 36430 TRANSFUSION BLD/BLD COMPNT: CPT

## 2023-06-27 PROCEDURE — 45378 DIAGNOSTIC COLONOSCOPY: CPT | Mod: 22,,, | Performed by: INTERNAL MEDICINE

## 2023-06-27 PROCEDURE — 94761 N-INVAS EAR/PLS OXIMETRY MLT: CPT

## 2023-06-27 PROCEDURE — 37000008 HC ANESTHESIA 1ST 15 MINUTES: Performed by: INTERNAL MEDICINE

## 2023-06-27 PROCEDURE — D9220A PRA ANESTHESIA: Mod: CRNA,,, | Performed by: NURSE ANESTHETIST, CERTIFIED REGISTERED

## 2023-06-27 RX ORDER — LIDOCAINE HYDROCHLORIDE 20 MG/ML
INJECTION INTRAVENOUS
Status: DISCONTINUED | OUTPATIENT
Start: 2023-06-27 | End: 2023-06-27

## 2023-06-27 RX ORDER — PROPOFOL 10 MG/ML
VIAL (ML) INTRAVENOUS CONTINUOUS PRN
Status: DISCONTINUED | OUTPATIENT
Start: 2023-06-27 | End: 2023-06-27

## 2023-06-27 RX ORDER — HYDROCODONE BITARTRATE AND ACETAMINOPHEN 500; 5 MG/1; MG/1
TABLET ORAL
Status: DISCONTINUED | OUTPATIENT
Start: 2023-06-27 | End: 2023-06-29

## 2023-06-27 RX ORDER — PROPOFOL 10 MG/ML
VIAL (ML) INTRAVENOUS
Status: DISCONTINUED | OUTPATIENT
Start: 2023-06-27 | End: 2023-06-27

## 2023-06-27 RX ORDER — PHENYLEPHRINE HYDROCHLORIDE 10 MG/ML
INJECTION INTRAVENOUS
Status: DISCONTINUED | OUTPATIENT
Start: 2023-06-27 | End: 2023-06-27

## 2023-06-27 RX ADMIN — SODIUM CHLORIDE: 0.9 INJECTION, SOLUTION INTRAVENOUS at 04:06

## 2023-06-27 RX ADMIN — PROPOFOL 30 MG: 10 INJECTION, EMULSION INTRAVENOUS at 04:06

## 2023-06-27 RX ADMIN — PANTOPRAZOLE SODIUM 40 MG: 40 INJECTION, POWDER, FOR SOLUTION INTRAVENOUS at 08:06

## 2023-06-27 RX ADMIN — PHENYLEPHRINE HYDROCHLORIDE 100 MCG: 10 INJECTION INTRAVENOUS at 04:06

## 2023-06-27 RX ADMIN — LIDOCAINE HYDROCHLORIDE 30 MG: 20 INJECTION INTRAVENOUS at 04:06

## 2023-06-27 RX ADMIN — PHENYLEPHRINE HYDROCHLORIDE 50 MCG: 10 INJECTION INTRAVENOUS at 04:06

## 2023-06-27 RX ADMIN — GABAPENTIN 300 MG: 300 CAPSULE ORAL at 08:06

## 2023-06-27 RX ADMIN — Medication 125 MCG/KG/MIN: at 04:06

## 2023-06-27 RX ADMIN — PANTOPRAZOLE SODIUM 40 MG: 40 INJECTION, POWDER, FOR SOLUTION INTRAVENOUS at 09:06

## 2023-06-27 NOTE — TRANSFER OF CARE
Anesthesia Transfer of Care Note    Patient: Zara Gonzáles    Procedure(s) Performed: Procedure(s) (LRB):  COLONOSCOPY (N/A)    Patient location: ICU    Anesthesia Type: general    Transport from OR: Transported from OR on 2-3 L/min O2 by NC with adequate spontaneous ventilation. Continuous ECG monitoring in transport. Continuous SpO2 monitoring in transport    Post pain: adequate analgesia    Post assessment: no apparent anesthetic complications and tolerated procedure well    Post vital signs: stable    Level of consciousness: sedated and responds to stimulation    Nausea/Vomiting: no nausea/vomiting    Complications: none    Transfer of care protocol was followedComments: Patient procedure done at bedside in ICU. ICU given anesthesia handoff, all questions answered. Patient remains monitored per ICU monitors.      Last vitals:   Visit Vitals  BP (!) 92/55   Pulse 77   Temp 36.7 °C (98 °F) (Oral)   Resp 15   Ht 6' (1.829 m)   Wt 84 kg (185 lb 3 oz)   SpO2 100%   BMI 25.12 kg/m²

## 2023-06-27 NOTE — INTERVAL H&P NOTE
The patient has been examined and the H&P has been reviewed:    Pre-Procedure H and P Addendum    Patient seen and examined.  History and exam unchanged from prior history and physical.      Procedure: Colonoscopy   Indication: Lower GI bleeding   ASA Class: per anesthesiology  Airway: normal  Neck Mobility: full range of motion  Mallampatti score: per anesthesia  History of anesthesia problems: no  Family history of anesthesia problems: no  Anesthesia Plan: MAC      Active Hospital Problems    Diagnosis  POA    *Lower GI bleed [K92.2]  Yes    Diverticulosis of colon [K57.30]  Yes    Abnormal CT scan, colon [R93.3]  Yes    Hemorrhagic shock [R57.8]  Yes    CAD (coronary artery disease) [I25.10]  Yes    Acute blood loss anemia [D62]  Yes    Gastroesophageal reflux disease without esophagitis [K21.9]  Yes    Mixed hyperlipidemia [E78.2]  Yes      Resolved Hospital Problems   No resolved problems to display.

## 2023-06-27 NOTE — PROGRESS NOTES
[facility] - Cardiac Medical ICU  Critical Care Medicine  Progress Note    Patient Name: Zara Gonzáles  MRN: [id number]  Admission Date: 6/26/2023  Hospital Length of Stay: 1 days  Code Status: Full Code  Attending Provider: Judd Lyon MD  Primary Care Provider: Judd Honeycutt MD   Principal Problem: Lower GI bleed    Subjective:     HPI:  Mr. Gonzáles is am 89 year old male with PMH notable for GERN, HLD, CAD, and neuropathy who presented to  [facility] with BRBPR and associated dizziness. Per chart review patient reports multiple blood bowel movements and had multiple while in the OSH ED. He arrived hypotensive with SBP in the 90s. In the ED he was given IVF a 3u PRBC. His most recent hemoglobin prior to transfer was 8.6. CTA at OSH: There is new high attenuation material in the distal descending and proximal sigmoid colon consistent with an active lower GI bleed in the distal descending colon. Patient denies any anticoagulation but does take ASA daily.     Notably, he had a recent Upper GI series: WNL. Colonoscopy with diverticulosis and internal hemorrhoids. Critical care medicine at [facility] consulted with plan for IR intervention upon arrival. At the time of transfer he is hemodynamically stable without need for vasopressor support.         Hospital/ICU Course:  No notes on file    Interval History/Significant Events: Active bleed yesterday requiring transfusions of FFP/Plt/PRBC. Golytely and colonoscopy planned today    Review of Systems   Constitutional:  Negative for chills, diaphoresis and fever.   HENT:  Negative for rhinorrhea, sneezing, sore throat and trouble swallowing.    Respiratory:  Negative for cough and shortness of breath.    Cardiovascular:  Negative for chest pain and palpitations.   Gastrointestinal:  Positive for anal bleeding and blood in stool. Negative for constipation, diarrhea, nausea and vomiting.   Genitourinary:  Negative for dysuria, frequency and urgency.    Musculoskeletal:  Negative for arthralgias and myalgias.   Skin:  Negative for rash and wound.   Neurological:  Negative for weakness and headaches.   Psychiatric/Behavioral:  Negative for agitation and confusion.    Objective:     Vital Signs (Most Recent):  Temp: 98.1 °F (36.7 °C) (06/27/23 0701)  Pulse: 83 (06/27/23 0728)  Resp: (!) 23 (06/27/23 0728)  BP: (!) 126/55 (06/27/23 0701)  SpO2: 98 % (06/27/23 0728) Vital Signs (24h Range):  Temp:  [97.8 °F (36.6 °C)-98.7 °F (37.1 °C)] 98.1 °F (36.7 °C)  Pulse:  [] 83  Resp:  [13-32] 23  SpO2:  [89 %-100 %] 98 %  BP: ()/(35-79) 126/55   Weight: 84 kg (185 lb 3 oz)  Body mass index is 25.12 kg/m².      Intake/Output Summary (Last 24 hours) at 6/27/2023 1123  Last data filed at 6/27/2023 0105  Gross per 24 hour   Intake 4661.4 ml   Output --   Net 4661.4 ml          Physical Exam  Constitutional:       Appearance: Normal appearance.   HENT:      Head: Normocephalic.   Eyes:      Extraocular Movements: Extraocular movements intact.      Pupils: Pupils are equal, round, and reactive to light.   Cardiovascular:      Rate and Rhythm: Normal rate and regular rhythm.      Pulses: Normal pulses.      Heart sounds: Normal heart sounds. No murmur heard.    No friction rub. No gallop.   Pulmonary:      Effort: Pulmonary effort is normal. No respiratory distress.      Breath sounds: No rales.   Chest:      Chest wall: No tenderness.   Abdominal:      General: Abdomen is flat.      Palpations: Abdomen is soft.      Tenderness: There is no abdominal tenderness. There is no guarding or rebound.   Musculoskeletal:      Cervical back: Normal range of motion and neck supple.      Right lower leg: No edema.      Left lower leg: No edema.   Skin:     General: Skin is warm and dry.   Neurological:      General: No focal deficit present.      Mental Status: He is alert and oriented to person, place, and time.   Psychiatric:         Mood and Affect: Mood normal.          Behavior: Behavior normal.         Thought Content: Thought content normal.         Judgment: Judgment normal.          Vents:     Lines/Drains/Airways       Peripheral Intravenous Line  Duration                  Peripheral IV - Single Lumen Left;Posterior Wrist -- days         Peripheral IV - Single Lumen 06/25/23 1507 20 G Right Antecubital 1 day         Peripheral IV - Single Lumen 06/25/23 1555 20 G Left Antecubital 1 day                  Significant Labs:    CBC/Anemia Profile:  Recent Labs   Lab 06/25/23  1551 06/25/23  1752 06/26/23  1721 06/27/23  0320 06/27/23  1046   WBC  --    < > 9.13 11.79 12.32   HGB  --    < > 5.8* 7.4* 7.1*   HCT  --    < > 18.6* 22.4* 21.8*   PLT  --    < > 102* 109* 123*   MCV  --    < > 87 83 82   RDW  --    < > 17.8* 17.7* 18.1*   OCCULTBLOOD Positive*  --   --   --   --     < > = values in this interval not displayed.        Chemistries:  Recent Labs   Lab 06/26/23  0045 06/26/23  0545 06/27/23  0320    139 143   K 4.0 4.1 3.9   * 110 112*   CO2 20* 20* 20*   BUN 15 18 22   CREATININE 0.9 1.0 1.0   CALCIUM 7.8* 7.7* 7.5*   ALBUMIN 2.8* 2.7* 2.7*   PROT 5.0* 4.8* 4.6*   BILITOT 1.5* 1.3* 1.0   ALKPHOS 43* 41* 40*   ALT 11 8* 10   AST 17 14 17   MG 1.8 1.9 1.8   PHOS 2.7 2.5* 3.2       All pertinent labs within the past 24 hours have been reviewed.    Significant Imaging:  I have reviewed all pertinent imaging results/findings within the past 24 hours.      ABG  No results for input(s): PH, PO2, PCO2, HCO3, BE in the last 168 hours.  Assessment/Plan:     Cardiac/Vascular  Hemorrhagic shock  Transfuse if vasopressors required with active bleed in 1/1/1 fashion.     CAD (coronary artery disease)  -- Holding ASA given GIB at this time. Will resume when clinically indicated   -- Continue home statin   -- SBP < 180 goal. Currently normotensive. Will hold home antihypertensives in setting of acute GIB    Mixed hyperlipidemia  -- Continue home statin  therapy    Oncology  Acute blood loss anemia  Plan per GI Bleed    GI  * Lower GI bleed  89 year old male with CAD on daily ASA, HLD, GERD and neuropathy who presented to OSH with BRBPR hypotensive with SBP 90s. CTA with active distal descending and proximal sigmoid colon bleed. He was given 1g Rocephin, 1L IVF, and 3u PRBC and transfer requested to [facility] for IR intervention.     -- IR consulted. No site of active bleeding seen on angiogram for possible embolization 6/26. Golytely and plan for colonoscopy today    -- CBC q8h and trend   -- Maintain current type and screen   -- Transfuse for hgb < 7   -- Maintain large bore IV access   -- MAP > 65 goal.        Critical Care Daily Checklist:    A: Awake: RASS Goal/Actual Goal:    Actual: Mcdonnell Agitation Sedation Scale (RASS): Alert and calm   B: Spontaneous Breathing Trial Performed?  NA   C: SAT & SBT Coordinated?  NA                     D: Delirium: CAM-ICU Overall CAM-ICU: Negative   E: Early Mobility Performed? No   F: Feeding Goal:    Status:     Current Diet Order   Procedures    Diet NPO      AS: Analgesia/Sedation NA   T: Thromboembolic Prophylaxis Holding due to GI bleed   H: HOB > 300 Yes   U: Stress Ulcer Prophylaxis (if needed) PPI   G: Glucose Control At goal   B: Bowel Function Stool Occurrence: 1   I: Indwelling Catheter (Lines & Lora) Necessity PIVs   D: De-escalation of Antimicrobials/Pharmacotherapies NA    Plan for the day/ETD Golytely and colonoscopy    Code Status:  Family/Goals of Care: Full Code  Full     Critical Care Time:50 minutes  Critical secondary to Patient has a condition that poses threat to life and bodily function: high risk lower GI bleed      Critical care was time spent personally by me on the following activities: development of treatment plan with patient or surrogate and bedside caregivers, discussions with consultants, evaluation of patient's response to treatment, examination of patient, ordering and performing  treatments and interventions, ordering and review of laboratory studies, ordering and review of radiographic studies, pulse oximetry, re-evaluation of patient's condition. This critical care time did not overlap with that of any other provider or involve time for any procedures.     Judd Lyon MD  Critical Care Medicine  [facility] - Cardiac Medical ICU

## 2023-06-27 NOTE — TREATMENT PLAN
GI Post-Procedure Treatment Plan    Colonoscopy complete    Impression:            - Hemorrhoids found on perianal exam.                          - Blood in the entire examined colon. This was                          completely cleared. Any hematochezia after this                          procedure will indicate recurrent bleeding.                          - Diverticulosis in the sigmoid colon and in the                          descending colon.                          - One 15 mm polyp in the cecum. Resection not                          attempted.                          - Three 3 to 7 mm polyps at the hepatic flexure                          and in the ascending colon. Resection not                          attempted.                          - The examination was otherwise normal on direct                          and retroflexion views.                          - The examined portion of the ileum was normal.                          - No specimens collected. Polyps not removed as                          risks of removal outweigh benefits.                          - Increased procedural services (modifier .22) for                          difficulty and complexity with increased time                          greater than 2x standard deviation.     Recommendation:   - Clear liquid diet  - Continue present medications  - Repeat colonoscopy PRN for signs of recurrent bleeding          Daniel Horn  Gastroenterology Fellow, PGY-IV

## 2023-06-27 NOTE — PLAN OF CARE
"CMICU DAILY GOALS       A: Awake    RASS: Goal -    Actual - RASS (Mcdonnell Agitation-Sedation Scale): alert and calm   Restraint necessity:    B: Breathe   SBT: Not intubated   C: Coordinate A & B, analgesics/sedatives   Pain:  no issues with pain      SAT: Not intubated  D: Delirium   CAM-ICU: Overall CAM-ICU: Negative  E: Early(intubated/ Progressive (non-intubated) Mobility   MOVE Screen: Fail   Activity: Activity Management: Arm raise - L1  FAS: Feeding/Nutrition   Diet order: Diet/Nutrition Received: ice chips, clear liquid,    T: Thrombus   DVT prophylaxis: VTE Required Core Measure: Per order contraindicated for SCDs/Anticoagulants  H: HOB Elevation   Head of Bed (HOB) Positioning: HOB at 30-45 degrees  U: Ulcer Prophylaxis   GI: yes  G: Glucose control   N/a     S: Skin   Bathing/Skin Care: back care, bath, complete, dressed/undressed, electrode patches/site rotation, foot care, incontinence care, linen changed  Device Skin Pressure Protection: adhesive use limited  Pressure Reduction Devices: specialty bed utilized  Pressure Reduction Techniques: weight shift assistance provided  Skin Protection: adhesive use limited, tubing/devices free from skin contact  B: Bowel Function   diarrhea bright red diahrrea with clots at the beginning of shift. As shift went on no clots noted bright red blood now more pinkish and not so much bright red.  I: Indwelling Catheters   Lora necessity:     CVC necessity: No  D: De-escalation Antibiotics   N/a    Family/Goals of care/Code Status   Code Status: Full Code    24H Vital Sign Range  Temp:  [97.7 °F (36.5 °C)-98.7 °F (37.1 °C)]   Pulse:  []   Resp:  [13-32]   BP: ()/(35-85)   SpO2:  [89 %-100 %]      Shift Events   Pt had multiple components of blood replacements. PRBc's x's 2 units. 1 dose of PLT and 2 units of FFP. Pt unable to tolerate entirety of golytely  . Pt drink about 3000 ml. Pt educated on need to drink the entire 4000 ml. Pt stated " I can't. I'm " "going to be sick." Pt had a total of 7 BM's this shift.     VS and assessment per flow sheet, patient progressing towards goals as tolerated, plan of care reviewed with  patient , all concerns addressed, will continue to monitor.    Angelique Hickman   "

## 2023-06-27 NOTE — ASSESSMENT & PLAN NOTE
89 year old male with CAD on daily ASA, HLD, GERD and neuropathy who presented to OSH with BRBPR hypotensive with SBP 90s. CTA with active distal descending and proximal sigmoid colon bleed. He was given 1g Rocephin, 1L IVF, and 3u PRBC and transfer requested to [facility] for IR intervention.     -- IR consulted. No site of active bleeding seen on angiogram for possible embolization 6/26. Golytely and plan for colonoscopy today    -- CBC q8h and trend   -- Maintain current type and screen   -- Transfuse for hgb < 7   -- Maintain large bore IV access   -- MAP > 65 goal.

## 2023-06-27 NOTE — SUBJECTIVE & OBJECTIVE
Interval History/Significant Events: Active bleed yesterday requiring transfusions of FFP/Plt/PRBC. Golytely and colonoscopy planned today    Review of Systems   Constitutional:  Negative for chills, diaphoresis and fever.   HENT:  Negative for rhinorrhea, sneezing, sore throat and trouble swallowing.    Respiratory:  Negative for cough and shortness of breath.    Cardiovascular:  Negative for chest pain and palpitations.   Gastrointestinal:  Positive for anal bleeding and blood in stool. Negative for constipation, diarrhea, nausea and vomiting.   Genitourinary:  Negative for dysuria, frequency and urgency.   Musculoskeletal:  Negative for arthralgias and myalgias.   Skin:  Negative for rash and wound.   Neurological:  Negative for weakness and headaches.   Psychiatric/Behavioral:  Negative for agitation and confusion.    Objective:     Vital Signs (Most Recent):  Temp: 98.1 °F (36.7 °C) (06/27/23 0701)  Pulse: 83 (06/27/23 0728)  Resp: (!) 23 (06/27/23 0728)  BP: (!) 126/55 (06/27/23 0701)  SpO2: 98 % (06/27/23 0728) Vital Signs (24h Range):  Temp:  [97.8 °F (36.6 °C)-98.7 °F (37.1 °C)] 98.1 °F (36.7 °C)  Pulse:  [] 83  Resp:  [13-32] 23  SpO2:  [89 %-100 %] 98 %  BP: ()/(35-79) 126/55   Weight: 84 kg (185 lb 3 oz)  Body mass index is 25.12 kg/m².      Intake/Output Summary (Last 24 hours) at 6/27/2023 1123  Last data filed at 6/27/2023 0105  Gross per 24 hour   Intake 4661.4 ml   Output --   Net 4661.4 ml          Physical Exam  Constitutional:       Appearance: Normal appearance.   HENT:      Head: Normocephalic.   Eyes:      Extraocular Movements: Extraocular movements intact.      Pupils: Pupils are equal, round, and reactive to light.   Cardiovascular:      Rate and Rhythm: Normal rate and regular rhythm.      Pulses: Normal pulses.      Heart sounds: Normal heart sounds. No murmur heard.    No friction rub. No gallop.   Pulmonary:      Effort: Pulmonary effort is normal. No respiratory distress.       Breath sounds: No rales.   Chest:      Chest wall: No tenderness.   Abdominal:      General: Abdomen is flat.      Palpations: Abdomen is soft.      Tenderness: There is no abdominal tenderness. There is no guarding or rebound.   Musculoskeletal:      Cervical back: Normal range of motion and neck supple.      Right lower leg: No edema.      Left lower leg: No edema.   Skin:     General: Skin is warm and dry.   Neurological:      General: No focal deficit present.      Mental Status: He is alert and oriented to person, place, and time.   Psychiatric:         Mood and Affect: Mood normal.         Behavior: Behavior normal.         Thought Content: Thought content normal.         Judgment: Judgment normal.          Vents:     Lines/Drains/Airways       Peripheral Intravenous Line  Duration                  Peripheral IV - Single Lumen Left;Posterior Wrist -- days         Peripheral IV - Single Lumen 06/25/23 1507 20 G Right Antecubital 1 day         Peripheral IV - Single Lumen 06/25/23 1555 20 G Left Antecubital 1 day                  Significant Labs:    CBC/Anemia Profile:  Recent Labs   Lab 06/25/23  1551 06/25/23  1752 06/26/23  1721 06/27/23  0320 06/27/23  1046   WBC  --    < > 9.13 11.79 12.32   HGB  --    < > 5.8* 7.4* 7.1*   HCT  --    < > 18.6* 22.4* 21.8*   PLT  --    < > 102* 109* 123*   MCV  --    < > 87 83 82   RDW  --    < > 17.8* 17.7* 18.1*   OCCULTBLOOD Positive*  --   --   --   --     < > = values in this interval not displayed.        Chemistries:  Recent Labs   Lab 06/26/23  0045 06/26/23  0545 06/27/23  0320    139 143   K 4.0 4.1 3.9   * 110 112*   CO2 20* 20* 20*   BUN 15 18 22   CREATININE 0.9 1.0 1.0   CALCIUM 7.8* 7.7* 7.5*   ALBUMIN 2.8* 2.7* 2.7*   PROT 5.0* 4.8* 4.6*   BILITOT 1.5* 1.3* 1.0   ALKPHOS 43* 41* 40*   ALT 11 8* 10   AST 17 14 17   MG 1.8 1.9 1.8   PHOS 2.7 2.5* 3.2       All pertinent labs within the past 24 hours have been reviewed.    Significant  Imaging:  I have reviewed all pertinent imaging results/findings within the past 24 hours.

## 2023-06-27 NOTE — PROVATION PATIENT INSTRUCTIONS
Discharge Summary/Instructions after an Endoscopic Procedure  Patient Name: Zara Gonzáles  Patient MRN: [id number]  Patient YOB: 1933 Tuesday, June 27, 2023  Darvin Erickson MD  Dear patient,  As a result of recent federal legislation (The Federal Cures Act), you may   receive lab or pathology results from your procedure in your [facility]   account before your physician is able to contact you. Your physician or   their representative will relay the results to you with their   recommendations at their soonest availability.  Thank you,  RESTRICTIONS:  During your procedure today, you received medications for sedation.  These   medications may affect your judgment, balance and coordination.  Therefore,   for 24 hours, you have the following restrictions:   - DO NOT drive a car, operate machinery, make legal/financial decisions,   sign important papers or drink alcohol.    ACTIVITY:  Today: no heavy lifting, straining or running due to procedural   sedation/anesthesia.  The following day: return to full activity including work.  DIET:  Eat and drink normally unless instructed otherwise.     TREATMENT FOR COMMON SIDE EFFECTS:  - Mild abdominal pain, nausea, belching, bloating or excessive gas:  rest,   eat lightly and use a heating pad.  - Sore Throat: treat with throat lozenges and/or gargle with warm salt   water.  - Because air was used during the procedure, expelling large amounts of air   from your rectum or belching is normal.  - If a bowel prep was taken, you may not have a bowel movement for 1-3 days.    This is normal.  SYMPTOMS TO WATCH FOR AND REPORT TO YOUR PHYSICIAN:  1. Abdominal pain or bloating, other than gas cramps.  2. Chest pain.  3. Back pain.  4. Signs of infection such as: chills or fever occurring within 24 hours   after the procedure.  5. Rectal bleeding, which would show as bright red, maroon, or black stools.   (A tablespoon of blood from the rectum is not serious, especially if    hemorrhoids are present.)  6. Vomiting.  7. Weakness or dizziness.  GO DIRECTLY TO THE NEAREST EMERGENCY ROOM IF YOU HAVE ANY OF THE FOLLOWING:      Difficulty breathing              Chills and/or fever over 101 F   Persistent vomiting and/or vomiting blood   Severe abdominal pain   Severe chest pain   Black, tarry stools   Bleeding- more than one tablespoon   Any other symptom or condition that you feel may need urgent attention  Your doctor recommends these additional instructions:  If any biopsies were taken, your doctors clinic will contact you in 1 to 2   weeks with any results.  - Observe patient in hospital.   - Clear liquid diet.   - Continue present medications.   - Repeat colonoscopy PRN for retreatment for signs of recurrent bleeding.      For questions, problems or results please call your physician - Darvin Erickson MD at Work:  [phone].  [facility], EMERGENCY ROOM PHONE NUMBER: [phone]  IF A COMPLICATION OR EMERGENCY SITUATION ARISES AND YOU ARE UNABLE TO REACH   YOUR PHYSICIAN - GO DIRECTLY TO THE EMERGENCY ROOM.  Darvin Erickson MD  6/27/2023 6:00:19 PM  This report has been verified and signed electronically.  Dear patient,  As a result of recent federal legislation (The Federal Cures Act), you may   receive lab or pathology results from your procedure in your [facility]   account before your physician is able to contact you. Your physician or   their representative will relay the results to you with their   recommendations at their soonest availability.  Thank you,  PROVATION

## 2023-06-28 PROBLEM — R57.8 HEMORRHAGIC SHOCK: Status: RESOLVED | Noted: 2023-06-26 | Resolved: 2023-06-28

## 2023-06-28 LAB
ALBUMIN SERPL BCP-MCNC: 2.6 G/DL (ref 3.5–5.2)
ALP SERPL-CCNC: 37 U/L (ref 55–135)
ALT SERPL W/O P-5'-P-CCNC: 12 U/L (ref 10–44)
ANION GAP SERPL CALC-SCNC: 9 MMOL/L (ref 8–16)
ANISOCYTOSIS BLD QL SMEAR: SLIGHT
AST SERPL-CCNC: 24 U/L (ref 10–40)
BASOPHILS # BLD AUTO: 0.02 K/UL (ref 0–0.2)
BASOPHILS # BLD AUTO: 0.03 K/UL (ref 0–0.2)
BASOPHILS # BLD AUTO: ABNORMAL K/UL (ref 0–0.2)
BASOPHILS NFR BLD: 0.2 % (ref 0–1.9)
BASOPHILS NFR BLD: 0.3 % (ref 0–1.9)
BASOPHILS NFR BLD: 1 % (ref 0–1.9)
BILIRUB SERPL-MCNC: 0.6 MG/DL (ref 0.1–1)
BUN SERPL-MCNC: 22 MG/DL (ref 8–23)
BURR CELLS BLD QL SMEAR: ABNORMAL
CALCIUM SERPL-MCNC: 7.2 MG/DL (ref 8.7–10.5)
CHLORIDE SERPL-SCNC: 110 MMOL/L (ref 95–110)
CO2 SERPL-SCNC: 22 MMOL/L (ref 23–29)
CREAT SERPL-MCNC: 0.9 MG/DL (ref 0.5–1.4)
DIFFERENTIAL METHOD: ABNORMAL
EOSINOPHIL # BLD AUTO: 0.1 K/UL (ref 0–0.5)
EOSINOPHIL # BLD AUTO: 0.2 K/UL (ref 0–0.5)
EOSINOPHIL # BLD AUTO: ABNORMAL K/UL (ref 0–0.5)
EOSINOPHIL NFR BLD: 0 % (ref 0–8)
EOSINOPHIL NFR BLD: 1.2 % (ref 0–8)
EOSINOPHIL NFR BLD: 1.7 % (ref 0–8)
ERYTHROCYTE [DISTWIDTH] IN BLOOD BY AUTOMATED COUNT: 17.2 % (ref 11.5–14.5)
ERYTHROCYTE [DISTWIDTH] IN BLOOD BY AUTOMATED COUNT: 17.3 % (ref 11.5–14.5)
ERYTHROCYTE [DISTWIDTH] IN BLOOD BY AUTOMATED COUNT: 17.4 % (ref 11.5–14.5)
EST. GFR  (NO RACE VARIABLE): >60 ML/MIN/1.73 M^2
GIANT PLATELETS BLD QL SMEAR: PRESENT
GLUCOSE SERPL-MCNC: 93 MG/DL (ref 70–110)
HCT VFR BLD AUTO: 21.5 % (ref 40–54)
HCT VFR BLD AUTO: 21.7 % (ref 40–54)
HCT VFR BLD AUTO: 23 % (ref 40–54)
HGB BLD-MCNC: 7.1 G/DL (ref 14–18)
HGB BLD-MCNC: 7.2 G/DL (ref 14–18)
HGB BLD-MCNC: 7.6 G/DL (ref 14–18)
HYPOCHROMIA BLD QL SMEAR: ABNORMAL
IMM GRANULOCYTES # BLD AUTO: 0.06 K/UL (ref 0–0.04)
IMM GRANULOCYTES # BLD AUTO: 0.09 K/UL (ref 0–0.04)
IMM GRANULOCYTES # BLD AUTO: ABNORMAL K/UL (ref 0–0.04)
IMM GRANULOCYTES NFR BLD AUTO: 0.6 % (ref 0–0.5)
IMM GRANULOCYTES NFR BLD AUTO: 0.8 % (ref 0–0.5)
IMM GRANULOCYTES NFR BLD AUTO: ABNORMAL % (ref 0–0.5)
LYMPHOCYTES # BLD AUTO: 1 K/UL (ref 1–4.8)
LYMPHOCYTES # BLD AUTO: 1.1 K/UL (ref 1–4.8)
LYMPHOCYTES # BLD AUTO: ABNORMAL K/UL (ref 1–4.8)
LYMPHOCYTES NFR BLD: 10.4 % (ref 18–48)
LYMPHOCYTES NFR BLD: 10.5 % (ref 18–48)
LYMPHOCYTES NFR BLD: 24 % (ref 18–48)
MAGNESIUM SERPL-MCNC: 1.9 MG/DL (ref 1.6–2.6)
MCH RBC QN AUTO: 28.3 PG (ref 27–31)
MCH RBC QN AUTO: 28.4 PG (ref 27–31)
MCH RBC QN AUTO: 28.8 PG (ref 27–31)
MCHC RBC AUTO-ENTMCNC: 32.7 G/DL (ref 32–36)
MCHC RBC AUTO-ENTMCNC: 33 G/DL (ref 32–36)
MCHC RBC AUTO-ENTMCNC: 33.5 G/DL (ref 32–36)
MCV RBC AUTO: 86 FL (ref 82–98)
MCV RBC AUTO: 86 FL (ref 82–98)
MCV RBC AUTO: 87 FL (ref 82–98)
MONOCYTES # BLD AUTO: 2.6 K/UL (ref 0.3–1)
MONOCYTES # BLD AUTO: 2.7 K/UL (ref 0.3–1)
MONOCYTES # BLD AUTO: ABNORMAL K/UL (ref 0.3–1)
MONOCYTES NFR BLD: 15 % (ref 4–15)
MONOCYTES NFR BLD: 25.7 % (ref 4–15)
MONOCYTES NFR BLD: 26.1 % (ref 4–15)
NEUTROPHILS # BLD AUTO: 6.1 K/UL (ref 1.8–7.7)
NEUTROPHILS # BLD AUTO: 6.6 K/UL (ref 1.8–7.7)
NEUTROPHILS NFR BLD: 57 % (ref 38–73)
NEUTROPHILS NFR BLD: 60.9 % (ref 38–73)
NEUTROPHILS NFR BLD: 61.6 % (ref 38–73)
NEUTS BAND NFR BLD MANUAL: 3 %
NRBC BLD-RTO: 0 /100 WBC
OVALOCYTES BLD QL SMEAR: ABNORMAL
PHOSPHATE SERPL-MCNC: 3 MG/DL (ref 2.7–4.5)
PLATELET # BLD AUTO: 88 K/UL (ref 150–450)
PLATELET # BLD AUTO: 94 K/UL (ref 150–450)
PLATELET # BLD AUTO: 97 K/UL (ref 150–450)
PLATELET BLD QL SMEAR: ABNORMAL
PMV BLD AUTO: 11.1 FL (ref 9.2–12.9)
PMV BLD AUTO: 11.4 FL (ref 9.2–12.9)
PMV BLD AUTO: 11.4 FL (ref 9.2–12.9)
POIKILOCYTOSIS BLD QL SMEAR: SLIGHT
POLYCHROMASIA BLD QL SMEAR: ABNORMAL
POTASSIUM SERPL-SCNC: 3.2 MMOL/L (ref 3.5–5.1)
PROT SERPL-MCNC: 4.4 G/DL (ref 6–8.4)
RBC # BLD AUTO: 2.5 M/UL (ref 4.6–6.2)
RBC # BLD AUTO: 2.51 M/UL (ref 4.6–6.2)
RBC # BLD AUTO: 2.68 M/UL (ref 4.6–6.2)
SCHISTOCYTES BLD QL SMEAR: ABNORMAL
SCHISTOCYTES BLD QL SMEAR: PRESENT
SODIUM SERPL-SCNC: 141 MMOL/L (ref 136–145)
SPHEROCYTES BLD QL SMEAR: ABNORMAL
STOMATOCYTES BLD QL SMEAR: PRESENT
WBC # BLD AUTO: 10.04 K/UL (ref 3.9–12.7)
WBC # BLD AUTO: 10.22 K/UL (ref 3.9–12.7)
WBC # BLD AUTO: 10.66 K/UL (ref 3.9–12.7)

## 2023-06-28 PROCEDURE — 84100 ASSAY OF PHOSPHORUS: CPT

## 2023-06-28 PROCEDURE — 97530 THERAPEUTIC ACTIVITIES: CPT

## 2023-06-28 PROCEDURE — 25000003 PHARM REV CODE 250: Performed by: NURSE PRACTITIONER

## 2023-06-28 PROCEDURE — 99233 PR SUBSEQUENT HOSPITAL CARE,LEVL III: ICD-10-PCS | Mod: ,,, | Performed by: INTERNAL MEDICINE

## 2023-06-28 PROCEDURE — 80053 COMPREHEN METABOLIC PANEL: CPT

## 2023-06-28 PROCEDURE — C9113 INJ PANTOPRAZOLE SODIUM, VIA: HCPCS

## 2023-06-28 PROCEDURE — 63600175 PHARM REV CODE 636 W HCPCS

## 2023-06-28 PROCEDURE — 83735 ASSAY OF MAGNESIUM: CPT

## 2023-06-28 PROCEDURE — 85007 BL SMEAR W/DIFF WBC COUNT: CPT | Performed by: NURSE PRACTITIONER

## 2023-06-28 PROCEDURE — 85025 COMPLETE CBC W/AUTO DIFF WBC: CPT | Mod: 91 | Performed by: INTERNAL MEDICINE

## 2023-06-28 PROCEDURE — 85025 COMPLETE CBC W/AUTO DIFF WBC: CPT | Performed by: NURSE PRACTITIONER

## 2023-06-28 PROCEDURE — 97165 OT EVAL LOW COMPLEX 30 MIN: CPT

## 2023-06-28 PROCEDURE — 94761 N-INVAS EAR/PLS OXIMETRY MLT: CPT

## 2023-06-28 PROCEDURE — 99233 SBSQ HOSP IP/OBS HIGH 50: CPT | Mod: ,,, | Performed by: INTERNAL MEDICINE

## 2023-06-28 PROCEDURE — 85027 COMPLETE CBC AUTOMATED: CPT | Performed by: NURSE PRACTITIONER

## 2023-06-28 PROCEDURE — 20600001 HC STEP DOWN PRIVATE ROOM

## 2023-06-28 RX ADMIN — PANTOPRAZOLE SODIUM 40 MG: 40 INJECTION, POWDER, FOR SOLUTION INTRAVENOUS at 08:06

## 2023-06-28 RX ADMIN — GABAPENTIN 300 MG: 300 CAPSULE ORAL at 08:06

## 2023-06-28 RX ADMIN — POTASSIUM BICARBONATE 50 MEQ: 978 TABLET, EFFERVESCENT ORAL at 06:06

## 2023-06-28 RX ADMIN — PRAVASTATIN SODIUM 40 MG: 40 TABLET ORAL at 08:06

## 2023-06-28 NOTE — TREATMENT PLAN
GI Treatment Plan    Zara Gonzáles is a 89 y.o. male admitted to hospital 6/26/2023 (Hospital Day: 3) due to Lower GI bleed.     Interval History  Hgb down to 5.3 overnight but no BM since colonoscopy yesterday  HDS  No complaints this morning    Objective  Temp:  [97.9 °F (36.6 °C)-98.3 °F (36.8 °C)] 97.9 °F (36.6 °C) (06/28 0701)  Pulse:  [] 87 (06/28 0900)  BP: ()/(45-61) 105/54 (06/28 0900)  Resp:  [8-42] 28 (06/28 0900)  SpO2:  [80 %-100 %] 99 % (06/28 0900)    General: Alert, Oriented x3, no distress  Abdomen: Non-distended. Normal tympany. Soft. Non-tender. No peritoneal signs.    Laboratory    Recent Labs   Lab 06/27/23  1046 06/27/23  1844 06/28/23  0459   HGB 7.1* 5.3* 7.6*          Significant Imaging:  Reviewed pertinent radiology findings.         Assessment/Plan:      Zara Gonzáles is a 89 y.o. male with history of GERD who presents with multiple episodes of bleeding per rectum and hypotension, now status post transfusion of 3 units of blood     Problem List:     Bleeding per rectum - Likely diverticular     Patient with known diverticular disease and identified active extrav in sigmoid on CTA but not detectable by time of IR angiography, no embolization performed.     Colonoscopy 6/27 showed old blood in colon but no active bleeding. Suspect diverticular source. No bleeding since colonoscopy.        Recommendations:     - Can advance diet as tolerated  - Would continue to monitor for evidence of rebleeding for 24h  - Monitor Hgb q8hrs  - Transfuse to keep Hgb >7, plts >50  - Hold anticoagulants if safe to do so per primary team  - If becomes hemodynamically unstable with associated large volume hematochezia, recommend STAT CTA and IR embolization if positive          - We will continue to follow.    Thank you for involving us in the care of Zara Gonzáles. Please call with any additional questions, concerns or changes in the patient's clinical status.    Daniel Horn,  MD  Gastroenterology Fellow, PGY IV

## 2023-06-28 NOTE — PLAN OF CARE
CMICU DAILY GOALS       A: Awake    RASS: Goal -    Actual - RASS (Mcdonnell Agitation-Sedation Scale): alert and calm   Restraint necessity:    B: Breathe   SBT: Not intubated   C: Coordinate A & B, analgesics/sedatives   Pain: managed    SAT: Not intubated  D: Delirium   CAM-ICU: Overall CAM-ICU: Negative  E: Early(intubated/ Progressive (non-intubated) Mobility   MOVE Screen: Pass   Activity: Activity Management: Arm raise - L1, Rolling - L1  FAS: Feeding/Nutrition   Diet order: Diet/Nutrition Received: clear liquid,    T: Thrombus   DVT prophylaxis: VTE Required Core Measure: Per order contraindicated for SCDs/Anticoagulants  H: HOB Elevation   Head of Bed (HOB) Positioning: HOB at 30-45 degrees  U: Ulcer Prophylaxis   GI: yes  G: Glucose control   managed    S: Skin   Bathing/Skin Care: bath, complete, linen changed  Device Skin Pressure Protection: absorbent pad utilized/changed, adhesive use limited, skin-to-device areas padded, skin-to-skin areas padded  Pressure Reduction Devices: specialty bed utilized, pressure-redistributing mattress utilized  Pressure Reduction Techniques: frequent weight shift encouraged  Skin Protection: adhesive use limited, incontinence pads utilized, silicone foam dressing in place, skin-to-device areas padded, skin-to-skin areas padded, transparent dressing maintained, tubing/devices free from skin contact  B: Bowel Function   no issues   I: Indwelling Catheters   Lora necessity:     CVC necessity: No  D: De-escalation Antibiotics   No    Family/Goals of care/Code Status   Code Status: Full Code    24H Vital Sign Range  Temp:  [97.9 °F (36.6 °C)-98.3 °F (36.8 °C)]   Pulse:  []   Resp:  [13-42]   BP: ()/(46-61)   SpO2:  [92 %-100 %]      Shift Events   No acute events throughout shift; Ambulated nunes with PT/OT; remained free from fall/injury; No BM or bleeding today.Stepdown orders received;awaiting bed.    VS and assessment per flow sheet, patient progressing towards  goals as tolerated, plan of care reviewed with patient/daughter, all concerns addressed, will continue to monitor.    Yamile Gaviria

## 2023-06-28 NOTE — PT/OT/SLP EVAL
Occupational Therapy   Evaluation    Name: Zara Gonzáles  MRN: [id number]  Admitting Diagnosis: Lower GI bleed  Recent Surgery: Procedure(s) (LRB):  COLONOSCOPY (N/A) 1 Day Post-Op    Recommendations:     Discharge Recommendations: home  Discharge Equipment Recommendations:  none  Barriers to discharge:  None    Assessment:     Zara Gonzáles is a 89 y.o. male with a medical diagnosis of Lower GI bleed. Pt. currently demonstrates decreased (I) with ADLs, functional mobility & t/fs decreased overall strength, ROM, endurance and balance. Pt would benefit from skilled OT services to address these deficits and to facilitate improving (I) with daily tasks. Performance deficits affecting function: impaired endurance, impaired self care skills, impaired functional mobility, gait instability, impaired balance, decreased coordination.      Rehab Prognosis: Good; patient would benefit from acute skilled OT services to address these deficits and reach maximum level of function.       Plan:     Patient to be seen 3 x/week to address the above listed problems via self-care/home management, therapeutic activities, therapeutic exercises  Plan of Care Expires: 07/28/23  Plan of Care Reviewed with: patient, daughter    Subjective     Occupational Profile:  Living Environment: Pt lives alone in a split-level condo with 4 steps / 1 HR inside as well as a walk-in shower with a chair.  Previous level of function: (I) with ADLs / mobility; drives; cooks.  Equipment Used at Home: bedside commode, wheelchair, rollator, shower chair  Assistance upon Discharge: family    Pain/Comfort:  Pain Rating 1: 0/10    Patients cultural, spiritual, Amish conflicts given the current situation:      Objective:     Communicated with: rn prior to session.  Patient found supine with telemetry, pulse ox (continuous), blood pressure cuff upon OT entry to room.    General Precautions: Standard, fall  Orthopedic Precautions:    Braces:    Respiratory  Status: Room air    Occupational Performance:    Bed Mobility:    Patient completed Rolling/Turning to Left with  supervision  Patient completed Scooting/Bridging with supervision  Patient completed Supine to Sit with stand by assistance    Functional Mobility/Transfers:  Patient completed Sit <> Stand Transfer with stand by assistance  with  no assistive device   Patient completed Bed <> Chair Transfer using Step Transfer technique with contact guard assistance with hand-held assist  Functional Mobility: Pt walked 120' HHA with 1 LOB at the end when turning.    Activities of Daily Living:  Feeding:  independence  Lower Body Dressing: stand by assistance     Cognitive/Visual Perceptual:  Cognitive/Psychosocial Skills:     -       Oriented to: Person, Place, Time, and Situation   -       Safety awareness/insight to disability: intact     Physical Exam:  BUE AROM/MMT: WNL  EOB balance = Good    AMPAC 6 Click ADL:  AMPAC Total Score: 21    Treatment & Education:  UE ROM/MMT  Bed mobility training / assessment  Functional mobility assessment  Sit/standing balance assessment  Educated on importance of sitting OOB in bedside chair to promote increased strength, endurance & breathing.  Discussed OT POC / Post-acute plan    Patient left up in chair with all lines intact and call button in reach    GOALS:   Multidisciplinary Problems       Occupational Therapy Goals          Problem: Occupational Therapy    Goal Priority Disciplines Outcome Interventions   Occupational Therapy Goal     OT, PT/OT Ongoing, Progressing    Description: Goals to be met by: 7/5/23    Patient will increase functional independence with ADLs by performing:    Grooming while standing at sink with Set-up Assistance.  Toileting from toilet with Set-up Assistance for hygiene and clothing management.   Supine to sit with [place].  Toilet transfer to toilet with Supervision.                         History:     Past Medical History:   Diagnosis Date     GERD (gastroesophageal reflux disease)     Hyperlipidemia     Neuropathy          Past Surgical History:   Procedure Laterality Date    COLONOSCOPY N/A 5/22/2017    Procedure: COLONOSCOPY;  Surgeon: Javon Mendez MD;  Location: [facility];  Service: Endoscopy;  Laterality: N/A;    COLONOSCOPY N/A 4/3/2023    Procedure: COLONOSCOPY;  Surgeon: Javon Mendez MD;  Location: [facility];  Service: Endoscopy;  Laterality: N/A;    COLONOSCOPY N/A 6/27/2023    Procedure: COLONOSCOPY;  Surgeon: Darvin Erickson MD;  Location: [address]);  Service: Endoscopy;  Laterality: N/A;    ESOPHAGOGASTRODUODENOSCOPY N/A 4/3/2023    Procedure: EGD (ESOPHAGOGASTRODUODENOSCOPY);  Surgeon: Javon Mendez MD;  Location: [facility];  Service: Endoscopy;  Laterality: N/A;    EYE SURGERY      TONSILLECTOMY, ADENOIDECTOMY         Time Tracking:     OT Date of Treatment: 06/28/23  OT Start Time: 1318  OT Stop Time: 1340  OT Total Time (min): 22 min    Billable Minutes:Evaluation 10  Therapeutic Activity 12    6/28/2023

## 2023-06-28 NOTE — RESIDENT HANDOFF
ICU Transfer of Care Note  Critical Care Medicine    Admit Date: 6/26/2023  LOS: 2    CC: Lower GI bleed    Code Status: Full Code         HPI and Hospital Course:     HPI:  Mr. Gonzáles is am 89 year old male with PMH notable for GERN, HLD, CAD, and neuropathy who presented to  [facility] with BRBPR and associated dizziness. Per chart review patient reports multiple blood bowel movements and had multiple while in the OSH ED. He arrived hypotensive with SBP in the 90s. In the ED he was given IVF a 3u PRBC. His most recent hemoglobin prior to transfer was 8.6. CTA at OSH: There is new high attenuation material in the distal descending and proximal sigmoid colon consistent with an active lower GI bleed in the distal descending colon. Patient denies any anticoagulation but does take ASA daily.     Notably, he had a recent Upper GI series: WNL. Colonoscopy with diverticulosis and internal hemorrhoids. Critical care medicine at [facility] consulted with plan for IR intervention upon arrival. At the time of transfer he is hemodynamically stable without need for vasopressor support.       Hospital/ICU Course:  Presented from OSH with lower GI bleed for IR embolization. CTA showed signs active extravasation. Angiogram did not show active bleeding. Pt had acute bleed requiring blood products and short duration of vasopressors. Pt had colonoscopy the following day with multiple polyps noted on exam, without removal or evidence of active bleeding. Has received 5 Units PRBC, 2 units FFP and 1 unit Plts at our facility, 3 units PRBC at outside hospital.        To Follow Up:     Monitor for rebleeding; scheduled CBC      Discharge Plan:     Home vs rehab    Call with questions.      Moira Yu, [facility]  Pulmonary Critical Care  [id number]

## 2023-06-28 NOTE — PLAN OF CARE
CMICU DAILY GOALS       A: Awake    RASS: Goal -    Actual - RASS (Mcdonnell Agitation-Sedation Scale): alert and calm   Restraint necessity:    B: Breathe   SBT: Not intubated   C: Coordinate A & B, analgesics/sedatives   Pain: managed    SAT: Not intubated  D: Delirium   CAM-ICU: Overall CAM-ICU: Negative  E: Early(intubated/ Progressive (non-intubated) Mobility   MOVE Screen: Fail   Activity: Activity Management: Arm raise - L1, Rolling - L1  FAS: Feeding/Nutrition   Diet order: Diet/Nutrition Received: NPO,    T: Thrombus   DVT prophylaxis: VTE Required Core Measure: Per order contraindicated for SCDs/Anticoagulants  H: HOB Elevation   Head of Bed (HOB) Positioning: HOB at 30-45 degrees  U: Ulcer Prophylaxis   GI: yes  G: Glucose control   managed    S: Skin   Bathing/Skin Care: back care, bath, complete, dressed/undressed, electrode patches/site rotation, foot care, incontinence care, linen changed  Device Skin Pressure Protection: absorbent pad utilized/changed, adhesive use limited, skin-to-device areas padded, skin-to-skin areas padded  Pressure Reduction Devices: specialty bed utilized, pressure-redistributing mattress utilized  Pressure Reduction Techniques: weight shift assistance provided  Skin Protection: adhesive use limited, incontinence pads utilized, silicone foam dressing in place, skin-to-device areas padded, skin-to-skin areas padded, transparent dressing maintained, tubing/devices free from skin contact  B: Bowel Function   Bloody liquid stools    I: Indwelling Catheters   Lora necessity:     CVC necessity: No  D: De-escalation Antibiotics   No    Family/Goals of care/Code Status   Code Status: Full Code    24H Vital Sign Range  Temp:  [97.9 °F (36.6 °C)-98.7 °F (37.1 °C)]   Pulse:  []   Resp:  [8-36]   BP: ()/(45-73)   SpO2:  [80 %-100 %]      Shift Events   Colonoscopy completed ; tolerated well;see GI progress note; Clear liquid diet; monitor H/H and any new bleeding.    VS and  assessment per flow sheet, patient progressing towards goals as tolerated, plan of care reviewed with patient/family, all concerns addressed, will continue to monitor.    Yamile Gaviria

## 2023-06-28 NOTE — PLAN OF CARE
Problem: Occupational Therapy  Goal: Occupational Therapy Goal  Description: Goals to be met by: 7/5/23    Patient will increase functional independence with ADLs by performing:    Grooming while standing at sink with Set-up Assistance.  Toileting from toilet with Set-up Assistance for hygiene and clothing management.   Supine to sit with [place].  Toilet transfer to toilet with Supervision.    Outcome: Ongoing, Progressing

## 2023-06-28 NOTE — HOSPITAL COURSE
Presented from OSH with lower GI bleed for IR embolization. CTA showed signs active extravasation. Angiogram did not show active bleeding. Pt had acute bleed requiring blood products and short duration of vasopressors. Pt had colonoscopy the following day with multiple polyps noted on exam, without removal or evidence of active bleeding. Has received 5 Units PRBC, 2 units FFP and 1 unit Plts at our facility, 3 units PRBC at outside hospital.

## 2023-06-28 NOTE — ASSESSMENT & PLAN NOTE
89 year old male with CAD on daily ASA, HLD, GERD and neuropathy who presented to OSH with BRBPR hypotensive with SBP 90s. CTA with active distal descending and proximal sigmoid colon bleed. He was given 1g Rocephin, 1L IVF, and 3u PRBC and transfer requested to [facility] for IR intervention. Has thus far received 8 Units PRBC, 2 Units FFP, 1 Unit of Plt    -- IR consulted. No site of active bleeding seen on angiogram for possible embolization 6/26. Colonoscopy showing multiple polyps without evidence of active bleeding.   -- if further bleeding reach out to GI. If requires further PRBC should be transfused a Unit of platelets and 2 Units of FFP.   -- CBC q8h and trend   -- Maintain current type and screen   -- Transfuse for hgb < 7   -- Maintain large bore IV access   -- MAP > 65 goal.

## 2023-06-28 NOTE — ANESTHESIA POSTPROCEDURE EVALUATION
Anesthesia Post Evaluation    Patient: Zara Gonzáles    Procedure(s) Performed: Procedure(s) (LRB):  COLONOSCOPY (N/A)    Final Anesthesia Type: general      Patient location during evaluation: PACU  Patient participation: Yes- Able to Participate  Level of consciousness: awake and alert, awake and oriented  Post-procedure vital signs: reviewed and stable  Pain management: adequate  Airway patency: patent    PONV status at discharge: No PONV  Anesthetic complications: no      Cardiovascular status: blood pressure returned to baseline, hemodynamically stable and stable  Respiratory status: unassisted, spontaneous ventilation and room air  Hydration status: euvolemic  Follow-up not needed.          Vitals Value Taken Time   /58 06/28/23 0547   Temp 36.7 °C (98 °F) 06/28/23 0301   Pulse 75 06/28/23 0710   Resp 16 06/28/23 0710   SpO2 97 % 06/28/23 0710   Vitals shown include unvalidated device data.      No case tracking events are documented in the log.      Pain/Nish Score: No data recorded

## 2023-06-28 NOTE — SUBJECTIVE & OBJECTIVE
Interval History/Significant Events: 2 Units PRBC transfused overnight after colonoscopy without evidence of bleeding.     Review of Systems   Constitutional:  Negative for chills, diaphoresis and fever.   HENT:  Negative for rhinorrhea, sneezing, sore throat and trouble swallowing.    Respiratory:  Negative for cough and shortness of breath.    Cardiovascular:  Negative for chest pain and palpitations.   Gastrointestinal:  Negative for anal bleeding, blood in stool, constipation, diarrhea, nausea and vomiting.   Genitourinary:  Negative for dysuria, frequency and urgency.   Musculoskeletal:  Negative for arthralgias and myalgias.   Skin:  Negative for rash and wound.   Neurological:  Negative for weakness and headaches.   Psychiatric/Behavioral:  Negative for agitation and confusion.    Objective:     Vital Signs (Most Recent):  Temp: 97.9 °F (36.6 °C) (06/28/23 0701)  Pulse: 76 (06/28/23 1100)  Resp: 18 (06/28/23 1100)  BP: (!) 109/53 (06/28/23 1100)  SpO2: 98 % (06/28/23 1100) Vital Signs (24h Range):  Temp:  [97.9 °F (36.6 °C)-98.3 °F (36.8 °C)] 97.9 °F (36.6 °C)  Pulse:  [] 76  Resp:  [8-42] 18  SpO2:  [80 %-100 %] 98 %  BP: ()/(45-61) 109/53   Weight: 84 kg (185 lb 3 oz)  Body mass index is 25.12 kg/m².      Intake/Output Summary (Last 24 hours) at 6/28/2023 1225  Last data filed at 6/28/2023 1100  Gross per 24 hour   Intake 955.78 ml   Output 800 ml   Net 155.78 ml          Physical Exam  Nursing note reviewed.   Constitutional:       Appearance: Normal appearance.   HENT:      Head: Normocephalic.   Eyes:      Extraocular Movements: Extraocular movements intact.      Pupils: Pupils are equal, round, and reactive to light.   Cardiovascular:      Rate and Rhythm: Normal rate and regular rhythm.      Pulses: Normal pulses.      Heart sounds: Normal heart sounds. No murmur heard.    No friction rub. No gallop.   Pulmonary:      Effort: Pulmonary effort is normal. No respiratory distress.      Breath  sounds: No rales.   Chest:      Chest wall: No tenderness.   Abdominal:      General: Abdomen is flat.      Palpations: Abdomen is soft.      Tenderness: There is no abdominal tenderness. There is no guarding or rebound.   Musculoskeletal:      Cervical back: Normal range of motion and neck supple.      Right lower leg: No edema.      Left lower leg: No edema.   Skin:     General: Skin is warm and dry.   Neurological:      General: No focal deficit present.      Mental Status: He is alert and oriented to person, place, and time.   Psychiatric:         Mood and Affect: Mood normal.         Behavior: Behavior normal.         Thought Content: Thought content normal.         Judgment: Judgment normal.          Vents:     Lines/Drains/Airways       Peripheral Intravenous Line  Duration                  Peripheral IV - Single Lumen 06/25/23 1507 20 G Right Antecubital 2 days                  Significant Labs:    CBC/Anemia Profile:  Recent Labs   Lab 06/27/23  1046 06/27/23  1844 06/28/23  0459   WBC 12.32 10.42 10.22   HGB 7.1* 5.3* 7.6*   HCT 21.8* 16.5* 23.0*   * 105* 97*   MCV 82 84 86   RDW 18.1* 18.8* 17.3*        Chemistries:  Recent Labs   Lab 06/27/23  0320 06/28/23  0459    141   K 3.9 3.2*   * 110   CO2 20* 22*   BUN 22 22   CREATININE 1.0 0.9   CALCIUM 7.5* 7.2*   ALBUMIN 2.7* 2.6*   PROT 4.6* 4.4*   BILITOT 1.0 0.6   ALKPHOS 40* 37*   ALT 10 12   AST 17 24   MG 1.8 1.9   PHOS 3.2 3.0       All pertinent labs within the past 24 hours have been reviewed.    Significant Imaging:  I have reviewed all pertinent imaging results/findings within the past 24 hours.

## 2023-06-28 NOTE — PROGRESS NOTES
[facility] - Cardiac Medical [facility]  Critical Care Medicine  Progress Note    Patient Name: Zara Gonzáles  MRN: [id number]  Admission Date: 6/26/2023  Hospital Length of Stay: 2 days  Code Status: Full Code  Attending Provider: Judd Lyon MD  Primary Care Provider: Judd Honeycutt MD   Principal Problem: Lower GI bleed    Subjective:     HPI:  Mr. Gonzáles is am 89 year old male with PMH notable for GERN, HLD, CAD, and neuropathy who presented to  [facility] with BRBPR and associated dizziness. Per chart review patient reports multiple blood bowel movements and had multiple while in the OSH ED. He arrived hypotensive with SBP in the 90s. In the ED he was given IVF a 3u PRBC. His most recent hemoglobin prior to transfer was 8.6. CTA at OSH: There is new high attenuation material in the distal descending and proximal sigmoid colon consistent with an active lower GI bleed in the distal descending colon. Patient denies any anticoagulation but does take ASA daily.     Notably, he had a recent Upper GI series: WNL. Colonoscopy with diverticulosis and internal hemorrhoids. Critical care medicine at [facility] consulted with plan for IR intervention upon arrival. At the time of transfer he is hemodynamically stable without need for vasopressor support.         Hospital/ICU Course:  Presented from OSH with lower GI bleed for IR embolization. CTA showed signs active extravasation. Angiogram did not show active bleeding. Pt had acute bleed requiring blood products and short duration of vasopressors. Pt had colonoscopy the following day with multiple polyps noted on exam, without removal or evidence of active bleeding. Has received 5 Units PRBC, 2 units FFP and 1 unit Plts at our facility, 3 units PRBC at outside hospital.        Interval History/Significant Events: 2 Units PRBC transfused overnight after colonoscopy without evidence of bleeding.     Review of Systems   Constitutional:  Negative for chills,  diaphoresis and fever.   HENT:  Negative for rhinorrhea, sneezing, sore throat and trouble swallowing.    Respiratory:  Negative for cough and shortness of breath.    Cardiovascular:  Negative for chest pain and palpitations.   Gastrointestinal:  Negative for anal bleeding, blood in stool, constipation, diarrhea, nausea and vomiting.   Genitourinary:  Negative for dysuria, frequency and urgency.   Musculoskeletal:  Negative for arthralgias and myalgias.   Skin:  Negative for rash and wound.   Neurological:  Negative for weakness and headaches.   Psychiatric/Behavioral:  Negative for agitation and confusion.    Objective:     Vital Signs (Most Recent):  Temp: 97.9 °F (36.6 °C) (06/28/23 0701)  Pulse: 76 (06/28/23 1100)  Resp: 18 (06/28/23 1100)  BP: (!) 109/53 (06/28/23 1100)  SpO2: 98 % (06/28/23 1100) Vital Signs (24h Range):  Temp:  [97.9 °F (36.6 °C)-98.3 °F (36.8 °C)] 97.9 °F (36.6 °C)  Pulse:  [] 76  Resp:  [8-42] 18  SpO2:  [80 %-100 %] 98 %  BP: ()/(45-61) 109/53   Weight: 84 kg (185 lb 3 oz)  Body mass index is 25.12 kg/m².      Intake/Output Summary (Last 24 hours) at 6/28/2023 1225  Last data filed at 6/28/2023 1100  Gross per 24 hour   Intake 955.78 ml   Output 800 ml   Net 155.78 ml          Physical Exam  Nursing note reviewed.   Constitutional:       Appearance: Normal appearance.   HENT:      Head: Normocephalic.   Eyes:      Extraocular Movements: Extraocular movements intact.      Pupils: Pupils are equal, round, and reactive to light.   Cardiovascular:      Rate and Rhythm: Normal rate and regular rhythm.      Pulses: Normal pulses.      Heart sounds: Normal heart sounds. No murmur heard.    No friction rub. No gallop.   Pulmonary:      Effort: Pulmonary effort is normal. No respiratory distress.      Breath sounds: No rales.   Chest:      Chest wall: No tenderness.   Abdominal:      General: Abdomen is flat.      Palpations: Abdomen is soft.      Tenderness: There is no abdominal  tenderness. There is no guarding or rebound.   Musculoskeletal:      Cervical back: Normal range of motion and neck supple.      Right lower leg: No edema.      Left lower leg: No edema.   Skin:     General: Skin is warm and dry.   Neurological:      General: No focal deficit present.      Mental Status: He is alert and oriented to person, place, and time.   Psychiatric:         Mood and Affect: Mood normal.         Behavior: Behavior normal.         Thought Content: Thought content normal.         Judgment: Judgment normal.          Vents:     Lines/Drains/Airways       Peripheral Intravenous Line  Duration                  Peripheral IV - Single Lumen 06/25/23 1507 20 G Right Antecubital 2 days                  Significant Labs:    CBC/Anemia Profile:  Recent Labs   Lab 06/27/23  1046 06/27/23  1844 06/28/23  0459   WBC 12.32 10.42 10.22   HGB 7.1* 5.3* 7.6*   HCT 21.8* 16.5* 23.0*   * 105* 97*   MCV 82 84 86   RDW 18.1* 18.8* 17.3*        Chemistries:  Recent Labs   Lab 06/27/23  0320 06/28/23  0459    141   K 3.9 3.2*   * 110   CO2 20* 22*   BUN 22 22   CREATININE 1.0 0.9   CALCIUM 7.5* 7.2*   ALBUMIN 2.7* 2.6*   PROT 4.6* 4.4*   BILITOT 1.0 0.6   ALKPHOS 40* 37*   ALT 10 12   AST 17 24   MG 1.8 1.9   PHOS 3.2 3.0       All pertinent labs within the past 24 hours have been reviewed.    Significant Imaging:  I have reviewed all pertinent imaging results/findings within the past 24 hours.      ABG  No results for input(s): PH, PO2, PCO2, HCO3, BE in the last 168 hours.  Assessment/Plan:     Cardiac/Vascular  CAD (coronary artery disease)  -- Holding ASA given GIB at this time. Will resume when clinically indicated   -- Continue home statin   -- SBP < 180 goal. Currently normotensive. Will hold home antihypertensives in setting of acute GIB    Mixed hyperlipidemia  -- Continue home statin therapy    Oncology  Acute blood loss anemia  Plan per GI Bleed    GI  * Lower GI bleed  89 year old male  with CAD on daily ASA, HLD, GERD and neuropathy who presented to OSH with BRBPR hypotensive with SBP 90s. CTA with active distal descending and proximal sigmoid colon bleed. He was given 1g Rocephin, 1L IVF, and 3u PRBC and transfer requested to [facility] for IR intervention. Has thus far received 8 Units PRBC, 2 Units FFP, 1 Unit of Plt    -- IR consulted. No site of active bleeding seen on angiogram for possible embolization 6/26. Colonoscopy showing multiple polyps without evidence of active bleeding.   -- if further bleeding reach out to GI. If requires further PRBC should be transfused a Unit of platelets and 2 Units of FFP.   -- CBC q8h and trend   -- Maintain current type and screen   -- Transfuse for hgb < 7   -- Maintain large bore IV access   -- MAP > 65 goal.        Judd Lyon MD  Critical Care Medicine  [facility] - Cardiac Medical ICU

## 2023-06-29 PROBLEM — E83.39 HYPOPHOSPHATEMIA: Status: ACTIVE | Noted: 2023-06-29

## 2023-06-29 LAB
ABO + RH BLD: NORMAL
ALBUMIN SERPL BCP-MCNC: 2.4 G/DL (ref 3.5–5.2)
ALP SERPL-CCNC: 39 U/L (ref 55–135)
ALT SERPL W/O P-5'-P-CCNC: 12 U/L (ref 10–44)
ANION GAP SERPL CALC-SCNC: 4 MMOL/L (ref 8–16)
ANISOCYTOSIS BLD QL SMEAR: SLIGHT
ANISOCYTOSIS BLD QL SMEAR: SLIGHT
AST SERPL-CCNC: 25 U/L (ref 10–40)
BASOPHILS # BLD AUTO: 0.01 K/UL (ref 0–0.2)
BASOPHILS # BLD AUTO: 0.02 K/UL (ref 0–0.2)
BASOPHILS NFR BLD: 0.1 % (ref 0–1.9)
BASOPHILS NFR BLD: 0.2 % (ref 0–1.9)
BILIRUB SERPL-MCNC: 0.3 MG/DL (ref 0.1–1)
BLD GP AB SCN CELLS X3 SERPL QL: NORMAL
BLD PROD TYP BPU: NORMAL
BLOOD UNIT EXPIRATION DATE: NORMAL
BLOOD UNIT TYPE CODE: 5100
BLOOD UNIT TYPE: NORMAL
BUN SERPL-MCNC: 17 MG/DL (ref 8–23)
CALCIUM SERPL-MCNC: 7.4 MG/DL (ref 8.7–10.5)
CHLORIDE SERPL-SCNC: 110 MMOL/L (ref 95–110)
CO2 SERPL-SCNC: 25 MMOL/L (ref 23–29)
CODING SYSTEM: NORMAL
CREAT SERPL-MCNC: 0.9 MG/DL (ref 0.5–1.4)
CROSSMATCH INTERPRETATION: NORMAL
DIFFERENTIAL METHOD: ABNORMAL
DISPENSE STATUS: NORMAL
EOSINOPHIL # BLD AUTO: 0.1 K/UL (ref 0–0.5)
EOSINOPHIL # BLD AUTO: 0.2 K/UL (ref 0–0.5)
EOSINOPHIL # BLD AUTO: 0.2 K/UL (ref 0–0.5)
EOSINOPHIL NFR BLD: 0.5 % (ref 0–8)
EOSINOPHIL NFR BLD: 0.6 % (ref 0–8)
EOSINOPHIL NFR BLD: 0.9 % (ref 0–8)
EOSINOPHIL NFR BLD: 1.6 % (ref 0–8)
EOSINOPHIL NFR BLD: 2.3 % (ref 0–8)
ERYTHROCYTE [DISTWIDTH] IN BLOOD BY AUTOMATED COUNT: 16.6 % (ref 11.5–14.5)
ERYTHROCYTE [DISTWIDTH] IN BLOOD BY AUTOMATED COUNT: 16.6 % (ref 11.5–14.5)
ERYTHROCYTE [DISTWIDTH] IN BLOOD BY AUTOMATED COUNT: 16.9 % (ref 11.5–14.5)
ERYTHROCYTE [DISTWIDTH] IN BLOOD BY AUTOMATED COUNT: 16.9 % (ref 11.5–14.5)
ERYTHROCYTE [DISTWIDTH] IN BLOOD BY AUTOMATED COUNT: 17.4 % (ref 11.5–14.5)
EST. GFR  (NO RACE VARIABLE): >60 ML/MIN/1.73 M^2
GIANT PLATELETS BLD QL SMEAR: PRESENT
GIANT PLATELETS BLD QL SMEAR: PRESENT
GLUCOSE SERPL-MCNC: 107 MG/DL (ref 70–110)
HCT VFR BLD AUTO: 21.1 % (ref 40–54)
HCT VFR BLD AUTO: 23.6 % (ref 40–54)
HCT VFR BLD AUTO: 24.1 % (ref 40–54)
HCT VFR BLD AUTO: 24.6 % (ref 40–54)
HCT VFR BLD AUTO: 25.4 % (ref 40–54)
HGB BLD-MCNC: 6.9 G/DL (ref 14–18)
HGB BLD-MCNC: 7.6 G/DL (ref 14–18)
HGB BLD-MCNC: 7.8 G/DL (ref 14–18)
HGB BLD-MCNC: 8.1 G/DL (ref 14–18)
HGB BLD-MCNC: 8.4 G/DL (ref 14–18)
HYPOCHROMIA BLD QL SMEAR: ABNORMAL
HYPOCHROMIA BLD QL SMEAR: ABNORMAL
IMM GRANULOCYTES # BLD AUTO: 0.04 K/UL (ref 0–0.04)
IMM GRANULOCYTES # BLD AUTO: 0.05 K/UL (ref 0–0.04)
IMM GRANULOCYTES # BLD AUTO: 0.06 K/UL (ref 0–0.04)
IMM GRANULOCYTES # BLD AUTO: 0.06 K/UL (ref 0–0.04)
IMM GRANULOCYTES # BLD AUTO: 0.07 K/UL (ref 0–0.04)
IMM GRANULOCYTES NFR BLD AUTO: 0.3 % (ref 0–0.5)
IMM GRANULOCYTES NFR BLD AUTO: 0.5 % (ref 0–0.5)
IMM GRANULOCYTES NFR BLD AUTO: 0.5 % (ref 0–0.5)
IMM GRANULOCYTES NFR BLD AUTO: 0.6 % (ref 0–0.5)
IMM GRANULOCYTES NFR BLD AUTO: 0.7 % (ref 0–0.5)
LYMPHOCYTES # BLD AUTO: 0.7 K/UL (ref 1–4.8)
LYMPHOCYTES # BLD AUTO: 0.8 K/UL (ref 1–4.8)
LYMPHOCYTES # BLD AUTO: 1 K/UL (ref 1–4.8)
LYMPHOCYTES # BLD AUTO: 1.1 K/UL (ref 1–4.8)
LYMPHOCYTES # BLD AUTO: 1.1 K/UL (ref 1–4.8)
LYMPHOCYTES NFR BLD: 11.8 % (ref 18–48)
LYMPHOCYTES NFR BLD: 5.5 % (ref 18–48)
LYMPHOCYTES NFR BLD: 6.8 % (ref 18–48)
LYMPHOCYTES NFR BLD: 9 % (ref 18–48)
LYMPHOCYTES NFR BLD: 9.5 % (ref 18–48)
MAGNESIUM SERPL-MCNC: 1.8 MG/DL (ref 1.6–2.6)
MCH RBC QN AUTO: 28.4 PG (ref 27–31)
MCH RBC QN AUTO: 28.5 PG (ref 27–31)
MCH RBC QN AUTO: 28.6 PG (ref 27–31)
MCH RBC QN AUTO: 29 PG (ref 27–31)
MCH RBC QN AUTO: 29.1 PG (ref 27–31)
MCHC RBC AUTO-ENTMCNC: 32.2 G/DL (ref 32–36)
MCHC RBC AUTO-ENTMCNC: 32.4 G/DL (ref 32–36)
MCHC RBC AUTO-ENTMCNC: 32.7 G/DL (ref 32–36)
MCHC RBC AUTO-ENTMCNC: 32.9 G/DL (ref 32–36)
MCHC RBC AUTO-ENTMCNC: 33.1 G/DL (ref 32–36)
MCV RBC AUTO: 87 FL (ref 82–98)
MCV RBC AUTO: 88 FL (ref 82–98)
MONOCYTES # BLD AUTO: 2.3 K/UL (ref 0.3–1)
MONOCYTES # BLD AUTO: 3 K/UL (ref 0.3–1)
MONOCYTES # BLD AUTO: 3.4 K/UL (ref 0.3–1)
MONOCYTES # BLD AUTO: 3.6 K/UL (ref 0.3–1)
MONOCYTES # BLD AUTO: 3.6 K/UL (ref 0.3–1)
MONOCYTES NFR BLD: 25.5 % (ref 4–15)
MONOCYTES NFR BLD: 27.6 % (ref 4–15)
MONOCYTES NFR BLD: 28.2 % (ref 4–15)
MONOCYTES NFR BLD: 29.6 % (ref 4–15)
MONOCYTES NFR BLD: 32.7 % (ref 4–15)
NEUTROPHILS # BLD AUTO: 5.4 K/UL (ref 1.8–7.7)
NEUTROPHILS # BLD AUTO: 6.2 K/UL (ref 1.8–7.7)
NEUTROPHILS # BLD AUTO: 6.6 K/UL (ref 1.8–7.7)
NEUTROPHILS # BLD AUTO: 7.2 K/UL (ref 1.8–7.7)
NEUTROPHILS # BLD AUTO: 8.2 K/UL (ref 1.8–7.7)
NEUTROPHILS NFR BLD: 56.6 % (ref 38–73)
NEUTROPHILS NFR BLD: 59.5 % (ref 38–73)
NEUTROPHILS NFR BLD: 61 % (ref 38–73)
NEUTROPHILS NFR BLD: 62.6 % (ref 38–73)
NEUTROPHILS NFR BLD: 64.7 % (ref 38–73)
NRBC BLD-RTO: 0 /100 WBC
NUM UNITS TRANS PACKED RBC: NORMAL
OVALOCYTES BLD QL SMEAR: ABNORMAL
OVALOCYTES BLD QL SMEAR: ABNORMAL
PHOSPHATE SERPL-MCNC: 1.9 MG/DL (ref 2.7–4.5)
PLATELET # BLD AUTO: 105 K/UL (ref 150–450)
PLATELET # BLD AUTO: 118 K/UL (ref 150–450)
PLATELET # BLD AUTO: 125 K/UL (ref 150–450)
PLATELET # BLD AUTO: 134 K/UL (ref 150–450)
PLATELET # BLD AUTO: 83 K/UL (ref 150–450)
PLATELET BLD QL SMEAR: ABNORMAL
PLATELET BLD QL SMEAR: ABNORMAL
PMV BLD AUTO: 11.1 FL (ref 9.2–12.9)
PMV BLD AUTO: 11.4 FL (ref 9.2–12.9)
PMV BLD AUTO: 11.4 FL (ref 9.2–12.9)
PMV BLD AUTO: 11.7 FL (ref 9.2–12.9)
PMV BLD AUTO: 12.2 FL (ref 9.2–12.9)
POIKILOCYTOSIS BLD QL SMEAR: SLIGHT
POIKILOCYTOSIS BLD QL SMEAR: SLIGHT
POLYCHROMASIA BLD QL SMEAR: ABNORMAL
POLYCHROMASIA BLD QL SMEAR: ABNORMAL
POTASSIUM SERPL-SCNC: 4 MMOL/L (ref 3.5–5.1)
PROT SERPL-MCNC: 4.2 G/DL (ref 6–8.4)
RBC # BLD AUTO: 2.43 M/UL (ref 4.6–6.2)
RBC # BLD AUTO: 2.67 M/UL (ref 4.6–6.2)
RBC # BLD AUTO: 2.73 M/UL (ref 4.6–6.2)
RBC # BLD AUTO: 2.79 M/UL (ref 4.6–6.2)
RBC # BLD AUTO: 2.89 M/UL (ref 4.6–6.2)
SCHISTOCYTES BLD QL SMEAR: ABNORMAL
SODIUM SERPL-SCNC: 139 MMOL/L (ref 136–145)
SPECIMEN OUTDATE: NORMAL
SPHEROCYTES BLD QL SMEAR: ABNORMAL
WBC # BLD AUTO: 10.88 K/UL (ref 3.9–12.7)
WBC # BLD AUTO: 11.02 K/UL (ref 3.9–12.7)
WBC # BLD AUTO: 11.44 K/UL (ref 3.9–12.7)
WBC # BLD AUTO: 12.61 K/UL (ref 3.9–12.7)
WBC # BLD AUTO: 9.13 K/UL (ref 3.9–12.7)

## 2023-06-29 PROCEDURE — 36415 COLL VENOUS BLD VENIPUNCTURE: CPT | Performed by: NURSE PRACTITIONER

## 2023-06-29 PROCEDURE — 99233 SBSQ HOSP IP/OBS HIGH 50: CPT | Mod: ,,, | Performed by: INTERNAL MEDICINE

## 2023-06-29 PROCEDURE — 99233 SBSQ HOSP IP/OBS HIGH 50: CPT | Mod: ,,, | Performed by: HOSPITALIST

## 2023-06-29 PROCEDURE — 99233 PR SUBSEQUENT HOSPITAL CARE,LEVL III: ICD-10-PCS | Mod: ,,, | Performed by: HOSPITALIST

## 2023-06-29 PROCEDURE — 85025 COMPLETE CBC W/AUTO DIFF WBC: CPT | Mod: 91 | Performed by: NURSE PRACTITIONER

## 2023-06-29 PROCEDURE — 94761 N-INVAS EAR/PLS OXIMETRY MLT: CPT

## 2023-06-29 PROCEDURE — 84100 ASSAY OF PHOSPHORUS: CPT | Performed by: INTERNAL MEDICINE

## 2023-06-29 PROCEDURE — 36430 TRANSFUSION BLD/BLD COMPNT: CPT

## 2023-06-29 PROCEDURE — 99233 PR SUBSEQUENT HOSPITAL CARE,LEVL III: ICD-10-PCS | Mod: ,,, | Performed by: INTERNAL MEDICINE

## 2023-06-29 PROCEDURE — 25000003 PHARM REV CODE 250: Performed by: NURSE PRACTITIONER

## 2023-06-29 PROCEDURE — 25000003 PHARM REV CODE 250: Performed by: HOSPITALIST

## 2023-06-29 PROCEDURE — 80053 COMPREHEN METABOLIC PANEL: CPT | Performed by: INTERNAL MEDICINE

## 2023-06-29 PROCEDURE — 83735 ASSAY OF MAGNESIUM: CPT | Performed by: INTERNAL MEDICINE

## 2023-06-29 PROCEDURE — 97161 PT EVAL LOW COMPLEX 20 MIN: CPT

## 2023-06-29 PROCEDURE — P9016 RBC LEUKOCYTES REDUCED: HCPCS | Performed by: NURSE PRACTITIONER

## 2023-06-29 PROCEDURE — 97110 THERAPEUTIC EXERCISES: CPT

## 2023-06-29 PROCEDURE — 20600001 HC STEP DOWN PRIVATE ROOM

## 2023-06-29 PROCEDURE — 86900 BLOOD TYPING SEROLOGIC ABO: CPT | Performed by: NURSE PRACTITIONER

## 2023-06-29 RX ORDER — SODIUM,POTASSIUM PHOSPHATES 280-250MG
2 POWDER IN PACKET (EA) ORAL
Status: DISCONTINUED | OUTPATIENT
Start: 2023-06-29 | End: 2023-06-30 | Stop reason: HOSPADM

## 2023-06-29 RX ORDER — HYDROCODONE BITARTRATE AND ACETAMINOPHEN 500; 5 MG/1; MG/1
TABLET ORAL
Status: DISCONTINUED | OUTPATIENT
Start: 2023-06-29 | End: 2023-06-30 | Stop reason: HOSPADM

## 2023-06-29 RX ADMIN — POTASSIUM & SODIUM PHOSPHATES POWDER PACK 280-160-250 MG 2 PACKET: 280-160-250 PACK at 09:06

## 2023-06-29 RX ADMIN — PRAVASTATIN SODIUM 40 MG: 40 TABLET ORAL at 09:06

## 2023-06-29 RX ADMIN — GABAPENTIN 300 MG: 300 CAPSULE ORAL at 09:06

## 2023-06-29 NOTE — HOSPITAL COURSE
Presented from OSH with lower GI bleed for IR embolization. CTA showed signs active extravasation. Angiogram did not show active bleeding. Pt had acute bleed requiring blood products and short duration of vasopressors. Pt had colonoscopy the following day with multiple polyps noted on exam, without removal or evidence of active bleeding. Has received 5 Units PRBC, 2 units FFP and 1 unit Plts at our facility, 3 units PRBC at outside hospital    6/29 Transfer to hospital medicine . H/o GERD who presents with multiple episodes of bleeding per rectum and hypotension s/p 3 units of blood - Likely diverticular bleed as with    known diverticular disease and identified active extrav in sigmoid on CTA but not detectable by time of IR angiography, no embolization performed. s/p GI eval  - Colonoscopy 6/27 showed old blood in colon but no active bleeding. Suspect diverticular source.   Diverticulosis in the sigmoid colon and in the  descending colon.                          - One 15 mm polyp in the cecum. Resection not                          attempted.                          - Three 3 to 7 mm polyps at the hepatic flexure                          and in the ascending colon. Resection not                          attempted.   No recurrent  bleeding since colonoscopy. advance diet as tolerated If becomes hemodynamically unstable with associated large volume hematochezia, recommend STAT CTA and IR embolization if positive .  Hb stable at 8.1. P replaced   6/30 Hb stable at 7.6 since last night. OK to discharge from GI perspective off ASA. orthostatics today

## 2023-06-29 NOTE — ASSESSMENT & PLAN NOTE
89 year old male with CAD on daily ASA, HLD, GERD and neuropathy who presented to OSH with BRBPR hypotensive with SBP 90s. CTA with active distal descending and proximal sigmoid colon bleed. He was given 1g Rocephin, 1L IVF, and 3u PRBC and transfer requested to [facility] for IR intervention. Has thus far received 8 Units PRBC, 2 Units FFP, 1 Unit of Plt     -- IR consulted. No site of active bleeding seen on angiogram for possible embolization 6/26. Colonoscopy showing multiple polyps without evidence of active bleeding.   -- if further bleeding reach out to GI. If requires further PRBC should be transfused a Unit of platelets and 2 Units of FFP.   -- CBC q8h and trend   -- Maintain current type and screen   -- Transfuse for hgb < 7   -- Maintain large bore IV access   -- MAP > 65 goal.     6/29  Patient's with Normocytic anemia.. Hemoglobin stable. Etiology likely due to acute blood loss .  Current CBC reviewed-  Recent Labs   Lab 06/28/23  1852 06/28/23  2358 06/29/23  0606   HGB 7.2* 6.9* 8.1*         Component Value Date/Time    MCV 88 06/29/2023 0606    RDW 16.9 (H) 06/29/2023 0606    IRON 53 12/07/2022 1436    FERRITIN 23 12/07/2022 1436    [id number] 925 12/07/2022 1436    OCCULTBLOOD Positive (A) 06/25/2023 1551     Monitor CBC and transfuse if H/H drops below 7/21.     6/29 Transfer to hospital medicine . H/o GERD who presents with multiple episodes of bleeding per rectum and hypotension s/p 3 units of blood - Likely diverticular bleed as with    known diverticular disease and identified active extrav in sigmoid on CTA but not detectable by time of IR angiography, no embolization performed. s/p GI eval  - Colonoscopy 6/27 showed old blood in colon but no active bleeding. Suspect diverticular source.

## 2023-06-29 NOTE — PLAN OF CARE
Problem: Physical Therapy  Goal: Physical Therapy Goal  Description: Goals to be met by:      Patient will increase functional independence with mobility by performin. Supine to sit with [place]  2. Gait  x 500 feet with Supervision using No Assistive Device.   3. Ascend/descend 8 stair with right Handrails Supervision using No Assistive Device.     Outcome: Ongoing, Progressing   Evaluated and content appropriate. 23

## 2023-06-29 NOTE — PROGRESS NOTES
[facility] Sun - Telemetry [facility]  Progress Note    Patient Name: Zara Gonzáles  MRN: [id number]  Patient Class: IP- Inpatient   Admission Date: 6/26/2023  Length of Stay: 3 days  Attending Physician: Jeronimo Del Rosario MD  Primary Care Provider: Judd Honeycutt MD        Subjective:     Principal Problem:Lower GI bleed        HPI:  Mr. Gonzáles is am 89 year old male with PMH notable for GERN, HLD, CAD, and neuropathy who presented to  [facility] with BRBPR and associated dizziness. Per chart review patient reports multiple blood bowel movements and had multiple while in the OSH ED. He arrived hypotensive with SBP in the 90s. In the ED he was given IVF a 3u PRBC. His most recent hemoglobin prior to transfer was 8.6. CTA at OSH: There is new high attenuation material in the distal descending and proximal sigmoid colon consistent with an active lower GI bleed in the distal descending colon. Patient denies any anticoagulation but does take ASA daily.      Notably, he had a recent Upper GI series: WNL. Colonoscopy with diverticulosis and internal hemorrhoids. [place] [facility] [place] with plan for IR intervention upon arrival. At the time of transfer he is hemodynamically stable without need for vasopressor support.              Overview/Hospital Course:      Presented from OSH with lower GI bleed for IR embolization. CTA showed signs active extravasation. Angiogram did not show active bleeding. Pt had acute bleed requiring blood products and short duration of vasopressors. Pt had colonoscopy the following day with multiple polyps noted on exam, without removal or evidence of active bleeding. Has received 5 Units PRBC, 2 units FFP and 1 unit Plts at our facility, 3 units PRBC at outside hospital    6/29 Transfer to hospital medicine . H/o GERD who presents with multiple episodes of bleeding per rectum and hypotension s/p 3 units of blood - Likely diverticular bleed  as with    known diverticular disease and identified active extrav in sigmoid on CTA but not detectable by time of IR angiography, no embolization performed. s/p GI eval  - Colonoscopy 6/27 showed old blood in colon but no active bleeding. Suspect diverticular source.   Diverticulosis in the sigmoid colon and in the  descending colon.                          - One 15 mm polyp in the cecum. Resection not                          attempted.                          - Three 3 to 7 mm polyps at the hepatic flexure                          and in the ascending colon. Resection not                          attempted.   No recurrent  bleeding since colonoscopy. advance diet as tolerated If becomes hemodynamically unstable with associated large volume hematochezia, recommend STAT CTA and IR embolization if positive .  Hb stable at 8.1. P replaced           Review of Systems:   Pain scale:   Constitutional:  fever,  chills, headache, vision loss, hearing loss, weight loss, Generalized weakness, falls, loss of smell, loss of taste, poor appetite,  sore throat  Respiratory: cough, shortness of breath.   Cardiovascular: chest pain, dizziness, palpitations, orthopnea, swelling of feet, syncope  Gastrointestinal: nausea, vomiting, abdominal pain, diarrhea, black stool,  blood in stool, change in bowel habits  Genitourinary: hematuria, dysuria, urgency, frequency  Integument/Breast: rash,  pruritis  Hematologic/Lymphatic: easy bruising, lymphadenopathy  Musculoskeletal: arthralgias , myalgias, back pain, neck pain, knee pain  Neurological: confusion, seizures, tremors, slurred speech  Behavioral/Psych:  depression, anxiety, auditory or visual hallucinations     OBJECTIVE:     Physical Exam:  Body mass index is 25.12 kg/m².    Constitutional: Appears well-developed and well-nourished.   Head: Normocephalic and atraumatic.   Neck: Normal range of motion. Neck supple.   Cardiovascular: Normal heart rate.  Regular heart  rhythm.  Pulmonary/Chest: Effort normal.   Abdominal: No distension.  No tenderness  Musculoskeletal: Normal range of motion. No edema.   Neurological: Alert and oriented to person, place, and time.   Skin: Skin is warm and dry.   Psychiatric: Normal mood and affect. Behavior is normal.                  Vital Signs  Temp: 99.5 °F (37.5 °C) (06/29/23 1547)  Pulse: 96 (06/29/23 1557)  Resp: 18 (06/29/23 1547)  BP: (Abnormal) 129/53 (06/29/23 1547)  SpO2: 98 % (06/29/23 1547)     24 Hour VS Range    Temp:  [97.9 °F (36.6 °C)-99.5 °F (37.5 °C)]   Pulse:  []   Resp:  [7-34]   BP: ()/(53-90)   SpO2:  [95 %-100 %]     Intake/Output Summary (Last 24 hours) at 6/29/2023 1828  Last data filed at 6/29/2023 1200  Gross per 24 hour   Intake 582 ml   Output 1950 ml   Net -1368 ml         I/O This Shift:  I/O this shift:  In: 582 [P.O.:582]  Out: 1000 [Urine:1000]    Wt Readings from Last 3 Encounters:   06/26/23 84 kg (185 lb 3 oz)   06/25/23 83 kg (183 lb)   06/19/23 83 kg (183 lb)       I have personally reviewed the vitals and recorded Intake/Output     Laboratory/Diagnostic Data:    CBC/Anemia Labs: Coags:    Recent Labs   Lab 06/25/23  1551 06/25/23  1752 06/28/23  2358 06/29/23  0606 06/29/23  1111   WBC  --    < > 9.13 10.88 12.61   HGB  --    < > 6.9* 8.1* 8.4*   HCT  --    < > 21.1* 24.6* 25.4*   PLT  --    < > 83* 105* 118*   MCV  --    < > 87 88 88   RDW  --    < > 17.4* 16.9* 16.6*   OCCULTBLOOD Positive*  --   --   --   --     < > = values in this interval not displayed.    Recent Labs   Lab 06/25/23  1510 06/26/23  0056   INR 1.1 1.1   APTT  --  27.7        Chemistries: ABG:   Recent Labs   Lab 06/27/23  0320 06/28/23  0459 06/29/23  0134    141 139   K 3.9 3.2* 4.0   * 110 110   CO2 20* 22* 25   BUN 22 22 17   CREATININE 1.0 0.9 0.9   CALCIUM 7.5* 7.2* 7.4*   PROT 4.6* 4.4* 4.2*   BILITOT 1.0 0.6 0.3   ALKPHOS 40* 37* 39*   ALT 10 12 12   AST 17 24 25   MG 1.8 1.9 1.8   PHOS 3.2 3.0 1.9*     No results for input(s): PH, PCO2, PO2, HCO3, POCSATURATED, BE in the last 168 hours.     POCT Glucose: HbA1c:    No results for input(s): POCTGLUCOSE in the last 168 hours. Hemoglobin A1C   Date Value Ref Range Status   10/12/2020 5.4 4.0 - 5.6 % Final     Comment:     ADA Screening Guidelines:  5.7-6.4%  Consistent with prediabetes  >or=6.5%  Consistent with diabetes  High levels of fetal hemoglobin interfere with the HbA1C  assay. Heterozygous hemoglobin variants (HbS, HgC, etc)do  not significantly interfere with this assay.   However, presence of multiple variants may affect accuracy.          Cardiac Enzymes: Ejection Fractions:    No results for input(s): CPK, CPKMB, MB, TROPONINI in the last 72 hours. EF   Date Value Ref Range Status   06/13/2023 60 % Final   11/25/2022 55 % Final     Nuc Stress EF   Date Value Ref Range Status   11/26/2022 62 % Final          No results for input(s): COLORU, APPEARANCEUA, PHUR, SPECGRAV, PROTEINUA, GLUCUA, KETONESU, BILIRUBINUA, OCCULTUA, NITRITE, UROBILINOGEN, LEUKOCYTESUR, RBCUA, WBCUA, BACTERIA, SQUAMEPITHEL, HYALINECASTS in the last 48 hours.    Invalid input(s): WRIGHTSUR    Procalcitonin (ng/mL)   Date Value   06/25/2023 0.06     Lactate (Lactic Acid) (mmol/L)   Date Value   06/25/2023 1.2     BNP (pg/mL)   Date Value   06/26/2023 31     No results found for: CRP, SEDRATE  No results found for: DDIMER  Ferritin (ng/mL)   Date Value   12/07/2022 23     No results found for: LDH  Troponin I (ng/mL)   Date Value   04/02/2023 <0.012   11/25/2022 <0.012   11/25/2022 <0.012   11/25/2022 <0.012   11/25/2022 <0.012   07/10/2019 <0.012   04/17/2018 <0.012     No results found for this or any previous visit.  SARS-CoV2 (COVID-19) Qualitative PCR (no units)   Date Value   04/02/2023 Negative     SARS-CoV-2 RNA, Amplification, Qual (no units)   Date Value   11/25/2022 Negative       Microbiology labs for the last week  Microbiology Results (last 7 days)       ** No results found  for the last 168 hours. **            Reviewed and noted in plan where applicable- Please see chart for full lab data.    Lines/Drains:       Peripheral IV - Single Lumen 06/28/23 1159 20 G;1 3/4 in Left Forearm (Active)   Site Assessment Dry;Intact 06/29/23 1500   Extremity Assessment Distal to IV No swelling;No warmth 06/29/23 0301   Line Status Saline locked;Flushed 06/29/23 1500   Dressing Status Dry;New drainage 06/29/23 1500   Dressing Intervention Integrity maintained 06/29/23 1500   Dressing Change Due 07/02/23 06/29/23 0301   Site Change Due 07/02/23 06/28/23 1901   Reason Not Rotated Not due 06/29/23 1500   Number of days: 1            Peripheral IV - Single Lumen 06/29/23 1112 20 G Anterior;Distal;Left Upper Arm (Active)   Number of days: 0       Imaging      Results for orders placed during the hospital encounter of 06/13/23    Echo    Interpretation Summary  · Overall the study quality was poor.  · The left ventricle is normal in size with concentric remodeling and normal systolic function.  · The estimated ejection fraction is 55 to 60%.  · LV walls not well visualized due to poor sonic windows, but no gross wall motion abnormalities noted.  · Normal left ventricular diastolic function.  · Normal right ventricular size with normal right ventricular systolic function.  · Mild aortic regurgitation.  · Moderate mitral regurgitation.  · Moderate tricuspid regurgitation.  · Mild pulmonic regurgitation.  · Normal central venous pressure (3 mmHg).  · The estimated PA systolic pressure is 30 mmHg.  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      IR Angiogram Visceral  Narrative: EXAMINATION:  Mesenteric angiography    Procedural Personnel    Attending physician(s): Shyam Roa MD    Fellow physician(s): Melissa Chapman MD    Resident physician(s): None    Advanced practice provider(s): None    Pre-procedure diagnosis: Lower GI hemorrhage    Post-procedure diagnosis: Same    Indication: Gastrointestinal  bleeding    Additional clinical history:    Complications: No immediate complications.    CLINICAL HISTORY:  89-year-old male with history of cardiovascular disease on aspirin presents with spontaneous lower GI hemorrhage.  IR evaluation requested.    TECHNIQUE:  - Arterial puncture with ultrasound guidance    - Selective mesenteric angiography: Superior mesenteric and inferior mesenteric angiography    - Superselective mesenteric angiography: Performed as described below    - Additional procedure(s): None    FINDINGS:  Pre-procedure    Consent: Informed consent for the procedure was obtained and time-out was performed prior to the procedure.    Preparation: The site was prepared and draped using maximal sterile barrier technique including cutaneous antisepsis.    Anesthesia/sedation    Level of anesthesia/sedation: Moderate sedation (conscious sedation)    Anesthesia/sedation administered by: Independent trained observer under attending supervision with continuous monitoring of the patient's level of consciousness and physiologic status    Total intra-service sedation time (minutes): 26    Access    Local anesthesia was administered. The vessel was sonographically evaluated and judged to be patent. Real time ultrasound was used to visualize needle entry into the vessel and a permanent image was stored. A 5 Macanese sheath was placed.    Vessel accessed: Right common femoral artery    Access technique: Micropuncture set with 21 gauge needle    Mesenteric angiography    The mesenteric arterial system was catheterized using 5 Macanese reverse curve catheter and 2.8 Macanese microcatheter.    Variant anatomy: None    Vessel catheterized: Superior mesenteric artery    Digital subtraction angiography demonstrates patency without evidence of stenosis, occlusion, or arterial injury.    Vessel catheterized: Inferior mesenteric artery    Digital subtraction angiography demonstrates patency without evidence of stenosis, occlusion,  or arterial injury.    Vessel catheterized: Left colic artery    Digital subtraction angiography demonstrates patency without evidence of occlusion or arterial injury.  Vasospasm of ascending left colic artery branches noted.  There is no evidence of active extravasation.    Vessel catheterized: Sigmoid artery    Digital subtraction angiography demonstrates patency without evidence of occlusion or arterial injury.  There is no evidence of active extravasation.    Closure    Access site angiography performed: Yes    Patent vessel with appropriate access level    Arterial closure technique: Angioseal    Hemostasis achieved from closure technique: Yes    Duration of manual compression (minutes): 2    Contrast    Contrast agent: Omnipaque 300    Contrast volume (mL): 60    Radiation Dose    Fluoroscopy time ( minutes): 11.3    Reference air kerma ( mGy): 150    Kerma area product ( uGy-m2): 4208    Additional Details    Additional description of procedure: None    Equipment details: None    Specimens removed: None    Estimated blood loss (mL): Less than 10    Standardized report: [email or web address]    Attestation    Signer name: Shyam Roa MD    I attest that I was present for the entire procedure. I reviewed the stored images and agree with the report as written.  Impression: Mesenteric angiography performed via superior mesenteric and inferior mesenteric artery branches, as above.  No evidence of active extravasation.  No embolization was performed.    Plan:    Care per ICU team.  Recommend GI consultation and colonoscopy if patient rebleeds.    _______________________________________________________________    Electronically signed by resident: Melissa Chapman  Date:    06/26/2023  Time:    12:27    Electronically signed by: Shyam Roa  Date:    06/26/2023  Time:    13:27  CTA Acute GI Frederica, Abdomen and Pelvis  Narrative: EXAMINATION:  CT a of the abdomen and pelvis with coronal and sagittal MIP  images    CLINICAL HISTORY:  GI bleed    TECHNIQUE:  Patient was injected with 80 cc of Omnipaque 350    COMPARISON:  None    FINDINGS:  There are increased markings in the lung bases felt to be chronic.  Liver appears normal.  Spleen appears normal.  Pancreas appears normal.  There is a small cholelithiasis without evidence of acute cholecystitis.  The adrenals are not enlarged.  Kidneys appear normal.  There is a simple cyst of the left kidney.  Aorta shows no evidence of an aneurysm.  The appendix appears normal.  There are changes consistent with active GI bleeding in the distal descending and proximal sigmoid colon.  Impression: Changes consistent with an active GI bleed in the distal descending and proximal sigmoid colon..    [employer] concordance    Electronically signed by: Eladio Cruz MD  Date:    06/26/2023  Time:    08:16      Labs, Imaging, EKG and Diagnostic results from 6/29/2023 were reviewed.    Medications:  Medication list was reviewed and changes noted under Assessment/Plan.  No current facility-administered medications on file prior to encounter.     Current Outpatient Medications on File Prior to Encounter   Medication Sig Dispense Refill    diltiaZEM (CARDIZEM) 60 MG tablet Take 1 tablet (60 mg total) by mouth every 12 (twelve) hours. 180 tablet 3    ferrous sulfate (FEOSOL) 325 mg (65 mg iron) Tab tablet Take 1 tablet (325 mg total) by mouth every Mon, Wed, Fri. 36 tablet 0    gabapentin (NEURONTIN) 300 MG capsule Take 1 capsule (300 mg total) by mouth 2 (two) times daily.      pravastatin (PRAVACHOL) 40 MG tablet TAKE 1 TABLET(40 MG) BY MOUTH EVERY DAY 90 tablet 1    acetaminophen (TYLENOL) 500 MG tablet Take 1,000 mg by mouth every 6 (six) hours as needed (headache).      artificial tears (TEARS LUBRICANT EYE DROP) 0.5 % ophthalmic solution Place 1 drop into both eyes daily as needed (dry eye (s)).      aspirin (ECOTRIN) 81 MG EC tablet Take 1 tablet (81 mg total) by mouth once daily. 30  tablet 11    multivit-min/FA/lycopen/lutein (CENTRUM SILVER MEN ORAL) Take 1 tablet by mouth once daily.      nitroGLYCERIN (NITROSTAT) 0.4 MG SL tablet Place 1 tablet (0.4 mg total) under the tongue every 5 (five) minutes as needed for Chest pain. 30 tablet 0     Scheduled Medications:  gabapentin, 300 mg, Oral, BID  potassium, sodium phosphates, 2 packet, Oral, QID (AC & HS)  pravastatin, 40 mg, Oral, Daily      PRN: sodium chloride, acetaminophen, ondansetron, prochlorperazine, sodium chloride 0.9%  Infusions:   Estimated Creatinine Clearance: 61.1 mL/min (based on SCr of 0.9 mg/dL).    Assessment/Plan:      * Lower GI bleed  as below    Hypophosphatemia  replaced       Diverticulosis of colon    6/29   s/p GI eval  - Colonoscopy 6/27 showed old blood in colon but no active bleeding. Suspect diverticular source.   Diverticulosis in the sigmoid colon and in the  descending colon.                          - One 15 mm polyp in the cecum. Resection not                          attempted.                          - Three 3 to 7 mm polyps at the hepatic flexure                          and in the ascending colon. Resection not                          attempted.   No recurrent  bleeding since colonoscopy. advance diet as tolerated If becomes hemodynamically unstable with associated large volume hematochezia, recommend STAT CTA and IR embolization if positive .  Hb stable at 8.1. P replaced       CAD (coronary artery disease)     -- Holding ASA given GIB at this time. Will resume when clinically indicated   -- Continue home statin   -- SBP < 180 goal. Currently normotensive. Will hold home antihypertensives in setting of acute GIB        Acute blood loss anemia    89 year old male with CAD on daily ASA, HLD, GERD and neuropathy who presented to OSH with BRBPR hypotensive with SBP 90s. CTA with active distal descending and proximal sigmoid colon bleed. He was given 1g Rocephin, 1L IVF, and 3u PRBC and transfer requested  to [facility] for IR intervention. Has thus far received 8 Units PRBC, 2 Units FFP, 1 Unit of Plt     -- IR consulted. No site of active bleeding seen on angiogram for possible embolization 6/26. Colonoscopy showing multiple polyps without evidence of active bleeding.   -- if further bleeding reach out to GI. If requires further PRBC should be transfused a Unit of platelets and 2 Units of FFP.   -- CBC q8h and trend   -- Maintain current type and screen   -- Transfuse for hgb < 7   -- Maintain large bore IV access   -- MAP > 65 goal.     6/29  Patient's with Normocytic anemia.. Hemoglobin stable. Etiology likely due to acute blood loss .  Current CBC reviewed-  Recent Labs   Lab 06/28/23  1852 06/28/23  2358 06/29/23  0606   HGB 7.2* 6.9* 8.1*         Component Value Date/Time    MCV 88 06/29/2023 0606    RDW 16.9 (H) 06/29/2023 0606    IRON 53 12/07/2022 1436    FERRITIN 23 12/07/2022 1436    [id number] 925 12/07/2022 1436    OCCULTBLOOD Positive (A) 06/25/2023 1551     Monitor CBC and transfuse if H/H drops below 7/21.     6/29 Transfer to hospital medicine . H/o GERD who presents with multiple episodes of bleeding per rectum and hypotension s/p 3 units of blood - Likely diverticular bleed as with    known diverticular disease and identified active extrav in sigmoid on CTA but not detectable by time of IR angiography, no embolization performed. s/p GI eval  - Colonoscopy 6/27 showed old blood in colon but no active bleeding. Suspect diverticular source.        Gastroesophageal reflux disease without esophagitis  no active issues on EGD        Mixed hyperlipidemia     Continue home statin therapy         VTE Risk Mitigation (From admission, onward)           Ordered     IP VTE HIGH RISK PATIENT  Once         06/26/23 0032                    Discharge Planning   MALCOLM: 6/30/2023     Code Status: Full Code   Is the patient medically ready for discharge?: (No Documentation)    Reason for patient still in hospital (select  all that apply): Treatment  Discharge Plan A: Home                  Jeronimo Del Rosario MD  Department of Hospital Medicine   [facility] maritza - Telemetry Stepdown

## 2023-06-29 NOTE — HPI
Mr. Gonzáles is am 89 year old male with PMH notable for GERN, HLD, CAD, and neuropathy who presented to  [facility] with BRBPR and associated dizziness. Per chart review patient reports multiple blood bowel movements and had multiple while in the OSH ED. He arrived hypotensive with SBP in the 90s. In the ED he was given IVF a 3u PRBC. His most recent hemoglobin prior to transfer was 8.6. CTA at OSH: There is new high attenuation material in the distal descending and proximal sigmoid colon consistent with an active lower GI bleed in the distal descending colon. Patient denies any anticoagulation but does take ASA daily.      Notably, he had a recent Upper GI series: WNL. Colonoscopy with diverticulosis and internal hemorrhoids. Critical care medicine at [facility] [place] [facility] Sun consulted with plan for IR intervention upon arrival. At the time of transfer he is hemodynamically stable without need for vasopressor support.

## 2023-06-29 NOTE — PT/OT/SLP EVAL
Physical Therapy Co-Evaluation and Treatment with OT    Patient Name:  Zara Gonzáles   MRN:  [id number]  Admit Date: 2023  Admitting Diagnosis:  Lower GI bleed  Length of Stay: 3 days  Recent Surgery: Procedure(s) (LRB):  COLONOSCOPY (N/A) 2 Days Post-Op    Recommendations:   Discharge Recommendations:  home   Discharge Equipment Recommendations:  (will assess closer to discharge)   Barriers to discharge: None  Plan:   During this hospitalization, patient to be seen 3 x/week to address the above listed problems via gait training, therapeutic activities, therapeutic exercises, neuromuscular re-education  Plan of Care Expires:  23  Plan of Care Reviewed with: patient  Assessment:     Zara Gonzáles is a 89 y.o. male admitted with a medical diagnosis of Lower GI bleed.  Pt tolerated treatment well with gait 200 ft SBA VSS, indicating ability for ambulation for household and short community distances. Pt unable to gait further due to fatigue, indicating decreased cardiovascular and musculoskeletal endurance. Pt displayed a very positive attitude towards treatment. Pt has neuropathy in both legs but condition did not affect functional mobility. Pt is below PLOF and would benefit from skilled PT 3 x/week to improve functional mobility and cardiovascular endurance. Pt able to be discharged home when medically stable.    Problem List: impaired endurance, impaired functional mobility, decreased lower extremity function  Rehab Prognosis: Good; patient would benefit from acute skilled PT services to address these deficits and reach maximum level of function.      GOALS:   Multidisciplinary Problems       Physical Therapy Goals          Problem: Physical Therapy    Goal Priority Disciplines Outcome Goal Variances Interventions   Physical Therapy Goal     PT, PT/OT Ongoing, Progressing     Description: Goals to be met by:      Patient will increase functional independence with mobility by performin.  Supine to sit with [place]  2. Gait  x 500 feet with Supervision using No Assistive Device.   3. Ascend/descend 8 stair with right Handrails Supervision using No Assistive Device.                        Subjective     Communicated with RN prior to session.  Patient found supine upon PT entry to room, agreeable to evaluation. Zara Gonzáles's alone present during session.    Chief Complaint: No chief complaint on file.    Patient/Family Comments/goals: Get better and return to PLOF  Pain/Comfort:  Pain Rating 1: 0/10    Social History:  Residence: lives alone [facility], number of outside stairs: 4 with 1 hand rail, and walk-in shower  Support available: family  Equipment Used: bedside commode, wheelchair, rollator, shower chair  Equipment Owned (not using): None  Prior level of function: independent  Hand Dominance: right.   Work: Retired.   Managing Medicines/Managing Home: yes.   Hobbies: spending time with family    Objective:   Patient found with: telemetry, blood pressure cuff, pulse ox (continuous)     General Precautions: Standard, Cardiac     Orthopedic Precautions:    Braces:     Oxygen Device: Room Air  Vitals: BP (!) 142/90 (BP Location: Left arm, Patient Position: Lying)   Pulse 101   Temp 98.4 °F (36.9 °C) (Axillary)   Resp (!) 31   Ht 6' (1.829 m)   Wt 84 kg (185 lb 3 oz)   SpO2 96%   BMI 25.12 kg/m²     Exams:  Cognition:   Oriented X 4, Alert, and Cooperative  Command following: Follows multistep verbal commands  Fluency: clear/fluent  Hearing: Intact  Vision:  wears glasses      Left UE Left LE Right UE Right LE   Edema absent absent absent absent   ROM AROM WFL AROM WFL AROM WFL AROM WFL          Strength 5/5 5/5 5/5 5/5   Sensation intact to light touch impaired to light touch intact to light touch impaired to light touch            Outcome Measures:  AM-PAC 6 CLICK MOBILITY  Turning over in bed (including adjusting bedclothes, sheets and blankets)?: 4  Sitting down on and  standing up from a chair with arms (e.g., wheelchair, bedside commode, etc.): 4  Moving from lying on back to sitting on the side of the bed?: 4  Moving to and from a bed to a chair (including a wheelchair)?: 4  Need to walk in hospital room?: 3  Climbing 3-5 steps with a railing?: 2  Basic Mobility Total Score: 21     Functional Mobility:  Additional staff present: Supervising PT  Bed Mobility:  Rolling/Turning to Left: supervision  Supine to Sit: supervision; HOB elevated  Scooting anteriorly to EOB to have both feet planted on floor: supervision  Pt returned to bedside chair      Transfers:   Sit <> Stand Transfer: stand by assistance with no assistive device   Stand <> Sit Transfer: stand by assistance with no assistive device   [id number] from EOB and [id number] from bedside chair      Gait:   Patient ambulated: 200 ft on room air portable monitor to follow   Patient required: standy by assistance  Patient used: no assistive device  Gait Pattern observed: reciprocal gait  Gait Speed:  Below normal- Limited Community Ambulator  Gait Deviation(s): decreased step length and decreased mckenzie  Impairments due to: decreased strength and decreased endurance  Comments: Pt gait 200 ft with 2 standing rest breaks. Pt's HR increased to 135 BPM after 1st 100 ft and returned to baseline after standing rest break. VSS throughout remaining gait. No SOB or LOB and patient unable to gait further due to fatigue.    Pt white board updated with current therapists name and level of mobility assistance needed.       Education:  Pt instructed on role of PT, PT POC, and LE there ex in sitting and verbally expressed understanding of such.    Patient left up in chair, with head in midline, neutral pelvis & heels floated for skin protection with all lines intact, call button in reach and RN notified.    History:     Past Medical History:   Diagnosis Date    GERD (gastroesophageal reflux disease)     Hyperlipidemia     Neuropathy        Past  Surgical History:   Procedure Laterality Date    COLONOSCOPY N/A 2017    Procedure: COLONOSCOPY;  Surgeon: Javon Mendez MD;  Location: [facility];  Service: Endoscopy;  Laterality: N/A;    COLONOSCOPY N/A 4/3/2023    Procedure: COLONOSCOPY;  Surgeon: Javon Mendez MD;  Location: [facility];  Service: Endoscopy;  Laterality: N/A;    COLONOSCOPY N/A 2023    Procedure: COLONOSCOPY;  Surgeon: Darvin Erickson MD;  [facility]  Service: Endoscopy;  Laterality: N/A;    ESOPHAGOGASTRODUODENOSCOPY N/A 4/3/2023    Procedure: EGD (ESOPHAGOGASTRODUODENOSCOPY);  Surgeon: Javon Mendez MD;  Location: [facility];  Service: Endoscopy;  Laterality: N/A;    EYE SURGERY      TONSILLECTOMY, ADENOIDECTOMY         Family History   Problem Relation Age of Onset    Coronary artery disease Mother     Coronary artery disease Brother        Social History     Socioeconomic History    Marital status:    Tobacco Use    Smoking status: Former     Types: Cigarettes     Quit date: 1973     Years since quittin.9    Smokeless tobacco: Never   Substance and Sexual Activity    Alcohol use: No     Alcohol/week: 0.0 standard drinks    Drug use: No     Social Determinants of Health     Financial Resource Strain: Low Risk     Difficulty of Paying Living Expenses: Not hard at all   Food Insecurity: No Food Insecurity    Worried About Running Out of Food in the Last Year: Never true    Ran Out of Food in the Last Year: Never true   Transportation Needs: No Transportation Needs    Lack of Transportation (Medical): No    Lack of Transportation (Non-Medical): No   Physical Activity: Inactive    Days of Exercise per Week: 0 days    Minutes of Exercise per Session: 0 min   Stress: Stress Concern Present    Feeling of Stress : To some extent   Social Connections: Socially Isolated    Frequency of Communication with Friends and Family: More than three times a week    Frequency of Social Gatherings with  Friends and Family: More than three times a week    Attends Anab[employer]ist Services: Never    Active Member of Clubs or Organizations: No    Attends Club or Organization Meetings: Never    Marital Status:    Housing Stability: Low Risk     Unable to Pay for Housing in the Last Year: No    Number of Places Lived in the Last Year: 1    Unstable Housing in the Last Year: No     Time Tracking:     PT Received On: 06/29/23  PT Start Time: 0919     PT Stop Time: 0940  PT Total Time (min): 21 min       Billable Minutes: Evaluation: 10 min Therapeutic Exercise 11 min    Zana Wooten, SPT  6/29/2023

## 2023-06-29 NOTE — PROGRESS NOTES
[facility] - Telemetry Stepdown  Critical Care Medicine  Progress Note    Patient Name: Zara Gonzáles  MRN: [id number]  Admission Date: 6/26/2023  Hospital Length of Stay: 3 days  Code Status: Full Code  Attending Provider: Pamela Waite MD  Primary Care Provider: Judd Honeycutt MD   Principal Problem: Lower GI bleed    Subjective:     HPI:  Mr. Gonzáles is am 89 year old male with PMH notable for GERN, HLD, CAD, and neuropathy who presented to  [facility] with BRBPR and associated dizziness. Per chart review patient reports multiple blood bowel movements and had multiple while in the OSH ED. He arrived hypotensive with SBP in the 90s. In the ED he was given IVF a 3u PRBC. His most recent hemoglobin prior to transfer was 8.6. CTA at OSH: There is new high attenuation material in the distal descending and proximal sigmoid colon consistent with an active lower GI bleed in the distal descending colon. Patient denies any anticoagulation but does take ASA daily.     Notably, he had a recent Upper GI series: WNL. Colonoscopy with diverticulosis and internal hemorrhoids. Critical care medicine at [facility] consulted with plan for IR intervention upon arrival. At the time of transfer he is hemodynamically stable without need for vasopressor support.         Hospital/ICU Course:  Presented from OSH with lower GI bleed for IR embolization. CTA showed signs active extravasation. Angiogram did not show active bleeding. Pt had acute bleed requiring blood products and short duration of vasopressors. Pt had colonoscopy the following day with multiple polyps noted on exam, without removal or evidence of active bleeding. Has received 5 Units PRBC, 2 units FFP and 1 unit Plts at our facility, 3 units PRBC at outside hospital.        Interval History/Significant Events: NAEON. Transfused 1U for Hgb of 6.9. stable Hgb today.     Review of Systems   Constitutional:  Negative for chills, diaphoresis and fever.   HENT:   Negative for rhinorrhea, sneezing, sore throat and trouble swallowing.    Respiratory:  Negative for cough and shortness of breath.    Cardiovascular:  Negative for chest pain and palpitations.   Gastrointestinal:  Negative for anal bleeding, blood in stool, constipation, diarrhea, nausea and vomiting.   Genitourinary:  Negative for dysuria, frequency and urgency.   Musculoskeletal:  Negative for arthralgias and myalgias.   Skin:  Negative for rash and wound.   Neurological:  Negative for weakness and headaches.   Psychiatric/Behavioral:  Negative for agitation and confusion.    Objective:     Vital Signs (Most Recent):  Temp: 99.1 °F (37.3 °C) (06/29/23 1430)  Pulse: 90 (06/29/23 1430)  Resp: (!) 21 (06/29/23 1430)  BP: (!) 97/58 (06/29/23 1430)  SpO2: 95 % (06/29/23 1430) Vital Signs (24h Range):  Temp:  [97.9 °F (36.6 °C)-99.1 °F (37.3 °C)] 99.1 °F (37.3 °C)  Pulse:  [] 90  Resp:  [7-34] 21  SpO2:  [85 %-100 %] 95 %  BP: ()/(46-90) 97/58   Weight: 84 kg (185 lb 3 oz)  Body mass index is 25.12 kg/m².      Intake/Output Summary (Last 24 hours) at 6/29/2023 1533  Last data filed at 6/29/2023 1200  Gross per 24 hour   Intake 582 ml   Output 1950 ml   Net -1368 ml          Physical Exam  Nursing note reviewed.   Constitutional:       Appearance: Normal appearance.   HENT:      Head: Normocephalic.   Eyes:      Extraocular Movements: Extraocular movements intact.      Pupils: Pupils are equal, round, and reactive to light.   Cardiovascular:      Rate and Rhythm: Normal rate and regular rhythm.      Pulses: Normal pulses.      Heart sounds: Normal heart sounds. No murmur heard.    No friction rub. No gallop.   Pulmonary:      Effort: Pulmonary effort is normal. No respiratory distress.      Breath sounds: No rales.   Chest:      Chest wall: No tenderness.   Abdominal:      General: Abdomen is flat.      Palpations: Abdomen is soft.      Tenderness: There is no abdominal tenderness. There is no guarding or  rebound.   Musculoskeletal:      Cervical back: Normal range of motion and neck supple.      Right lower leg: No edema.      Left lower leg: No edema.   Skin:     General: Skin is warm and dry.   Neurological:      General: No focal deficit present.      Mental Status: He is alert and oriented to person, place, and time.   Psychiatric:         Mood and Affect: Mood normal.         Behavior: Behavior normal.         Thought Content: Thought content normal.         Judgment: Judgment normal.          Vents:     Lines/Drains/Airways       Peripheral Intravenous Line  Duration                  Peripheral IV - Single Lumen 06/28/23 1159 20 G;1 3/4 in Left Forearm 1 day         Peripheral IV - Single Lumen 06/29/23 1112 20 G Anterior;Distal;Left Upper Arm <1 day                  Significant Labs:    CBC/Anemia Profile:  Recent Labs   Lab 06/28/23  2358 06/29/23  0606 06/29/23  1111   WBC 9.13 10.88 12.61   HGB 6.9* 8.1* 8.4*   HCT 21.1* 24.6* 25.4*   PLT 83* 105* 118*   MCV 87 88 88   RDW 17.4* 16.9* 16.6*        Chemistries:  Recent Labs   Lab 06/28/23  0459 06/29/23  0134    139   K 3.2* 4.0    110   CO2 22* 25   BUN 22 17   CREATININE 0.9 0.9   CALCIUM 7.2* 7.4*   ALBUMIN 2.6* 2.4*   PROT 4.4* 4.2*   BILITOT 0.6 0.3   ALKPHOS 37* 39*   ALT 12 12   AST 24 25   MG 1.9 1.8   PHOS 3.0 1.9*       All pertinent labs within the past 24 hours have been reviewed.    Significant Imaging:  I have reviewed all pertinent imaging results/findings within the past 24 hours.      ABG  No results for input(s): PH, PO2, PCO2, HCO3, BE in the last 168 hours.  Assessment/Plan:     Cardiac/Vascular  CAD (coronary artery disease)  -- Holding ASA given GIB at this time. Will resume when clinically indicated   -- Continue home statin   -- SBP < 180 goal. Currently normotensive. Will hold home antihypertensives in setting of acute GIB    Mixed hyperlipidemia  -- Continue home statin therapy    Oncology  Acute blood loss anemia  Plan  per GI Bleed    GI  * Lower GI bleed  89 year old male with CAD on daily ASA, HLD, GERD and neuropathy who presented to OSH with BRBPR hypotensive with SBP 90s. CTA with active distal descending and proximal sigmoid colon bleed. He was given 1g Rocephin, 1L IVF, and 3u PRBC and transfer requested to [facility] for IR intervention. Has thus far received 8 Units PRBC, 2 Units FFP, 1 Unit of Plt    -- IR consulted. No site of active bleeding seen on angiogram for possible embolization 6/26. Colonoscopy showing multiple polyps without evidence of active bleeding.   -- if further bleeding reach out to GI. If requires further PRBC should be transfused a Unit of platelets and 2 Units of FFP.   -- CBC q8h and trend   -- Maintain current type and screen   -- Transfuse for hgb < 7   -- Maintain large bore IV access   -- MAP > 65 goal.       Judd Lyon MD  Critical Care Medicine  [facility] - Telemetry Stepdown

## 2023-06-29 NOTE — PLAN OF CARE
CMICU DAILY GOALS       A: Awake    RASS: Goal -    Actual - RASS (Mcdonnell Agitation-Sedation Scale): alert and calm   Restraint necessity:    B: Breathe   SBT: Not intubated   C: Coordinate A & B, analgesics/sedatives   Pain: managed    SAT: Not intubated  D: Delirium   CAM-ICU: Overall CAM-ICU: Negative  E: Early(intubated/ Progressive (non-intubated) Mobility   MOVE Screen: Pass   Activity: Activity Management: Bridge - L1, Leg kicks - L2, Rolling - L1  FAS: Feeding/Nutrition   Diet order: Diet/Nutrition Received: regular,    T: Thrombus   DVT prophylaxis: VTE Required Core Measure: Pharmacological prophylaxis initiated/maintained  H: HOB Elevation   Head of Bed (HOB) Positioning: HOB elevated  U: Ulcer Prophylaxis   GI: no  G: Glucose control   managed    S: Skin   Bathing/Skin Care: linen changed  Device Skin Pressure Protection: absorbent pad utilized/changed  Pressure Reduction Devices: specialty bed utilized  Pressure Reduction Techniques: weight shift assistance provided  Skin Protection: adhesive use limited  B: Bowel Function   no issues   I: Indwelling Catheters   Lora necessity:     CVC necessity: No  D: De-escalation Antibiotics   No    Family/Goals of care/Code Status   Code Status: Full Code    24H Vital Sign Range  Temp:  [97.9 °F (36.6 °C)-99.1 °F (37.3 °C)]   Pulse:  []   Resp:  [7-34]   BP: ()/(46-90)   SpO2:  [85 %-100 %]      Shift Events   No acute events throughout shift. Patient prepared for stepdown from ICU.    VS and assessment per flow sheet, patient progressing towards goals as tolerated, plan of care reviewed with  patient and daughter , concerns addressed, will continue to monitor.    Ailyn May

## 2023-06-29 NOTE — TREATMENT PLAN
GI Treatment Plan    Zara Gonzáles is a 89 y.o. male admitted to hospital 6/26/2023 (Hospital Day: 4) due to Lower GI bleed.     Interval History  Hgb down to 5.3 overnight but no BM since colonoscopy yesterday  HDS  No complaints this morning    Objective  Temp:  [97.9 °F (36.6 °C)-99.1 °F (37.3 °C)] 99.1 °F (37.3 °C) (06/29 1430)  Pulse:  [] 90 (06/29 1430)  BP: ()/(46-90) 97/58 (06/29 1430)  Resp:  [7-34] 21 (06/29 1430)  SpO2:  [85 %-100 %] 95 % (06/29 1430)    General: Alert, Oriented x3, no distress  Abdomen: Non-distended. Normal tympany. Soft. Non-tender. No peritoneal signs.    Laboratory    Recent Labs   Lab 06/28/23  2358 06/29/23  0606 06/29/23  1111   HGB 6.9* 8.1* 8.4*          Significant Imaging:  Reviewed pertinent radiology findings.         Assessment/Plan:      Zara Gonzáles is a 89 y.o. male with history of GERD who presents with multiple episodes of bleeding per rectum and hypotension, now status post transfusion of 3 units of blood     Problem List:     Bleeding per rectum - Likely diverticular     Patient with known diverticular disease and identified active extrav in sigmoid on CTA but not detectable by time of IR angiography, no embolization performed.     Colonoscopy 6/27 showed old blood in colon but no active bleeding. Suspect diverticular source. No bleeding since colonoscopy.        Recommendations:     - Ok for regular diet  - Would continue to monitor for evidence of rebleeding for 24h  - Monitor Hgb q12 hrs  - Transfuse to keep Hgb >7, plts >50  - Hold anticoagulants if safe to do so per primary team  - If no bleeding by 6/30 with stable Hgb, ok to discharge     - We will continue to follow.    Thank you for involving us in the care of Zara Gonzáles. Please call with any additional questions, concerns or changes in the patient's clinical status.    Daniel Horn MD  Gastroenterology Fellow, PGY IV

## 2023-06-29 NOTE — ASSESSMENT & PLAN NOTE
89 year old male with CAD on daily ASA, HLD, GERD and neuropathy who presented to OSH with BRBPR hypotensive with SBP 90s. CTA with active distal descending and proximal sigmoid colon bleed. He was given 1g Rocephin, 1L IVF, and 3u PRBC and transfer requested to [facility] for IR intervention. Has thus far received 8 Units PRBC, 2 Units FFP, 1 Unit of Plt    -- IR consulted. No site of active bleeding seen on angiogram for possible embolization 6/26. Colonoscopy showing multiple polyps without evidence of active bleeding.   -- if further bleeding reach out to GI. If requires further PRBC should be transfused a Unit of platelets and 2 Units of FFP.   -- CBC q8h and trend   -- Maintain current type and screen   -- Transfuse for hgb < 7   -- Maintain large bore IV access   -- MAP > 65 goal.

## 2023-06-29 NOTE — NURSING TRANSFER
Nursing Transfer Note      6/29/2023     Reason patient is being transferred: stepdown    Transfer To: [id number]    Transfer via wheelchair    Transfer with cardiac monitoring    Transported by RN and PCT    Telemetry: Rhythm NSR    Medicines sent: no    Any special needs or follow-up needed: CBC at 1600    Chart send with patient: Yes    Notified: daughter    Patient reassessed at: 6/30, 1500   1  Upon arrival to floor: cardiac monitor applied, patient oriented to room, call bell in reach, and bed in lowest position

## 2023-06-29 NOTE — SUBJECTIVE & OBJECTIVE
Interval History/Significant Events: NAEON. Transfused 1U for Hgb of 6.9. stable Hgb today.     Review of Systems   Constitutional:  Negative for chills, diaphoresis and fever.   HENT:  Negative for rhinorrhea, sneezing, sore throat and trouble swallowing.    Respiratory:  Negative for cough and shortness of breath.    Cardiovascular:  Negative for chest pain and palpitations.   Gastrointestinal:  Negative for anal bleeding, blood in stool, constipation, diarrhea, nausea and vomiting.   Genitourinary:  Negative for dysuria, frequency and urgency.   Musculoskeletal:  Negative for arthralgias and myalgias.   Skin:  Negative for rash and wound.   Neurological:  Negative for weakness and headaches.   Psychiatric/Behavioral:  Negative for agitation and confusion.    Objective:     Vital Signs (Most Recent):  Temp: 99.1 °F (37.3 °C) (06/29/23 1430)  Pulse: 90 (06/29/23 1430)  Resp: (!) 21 (06/29/23 1430)  BP: (!) 97/58 (06/29/23 1430)  SpO2: 95 % (06/29/23 1430) Vital Signs (24h Range):  Temp:  [97.9 °F (36.6 °C)-99.1 °F (37.3 °C)] 99.1 °F (37.3 °C)  Pulse:  [] 90  Resp:  [7-34] 21  SpO2:  [85 %-100 %] 95 %  BP: ()/(46-90) 97/58   Weight: 84 kg (185 lb 3 oz)  Body mass index is 25.12 kg/m².      Intake/Output Summary (Last 24 hours) at 6/29/2023 1533  Last data filed at 6/29/2023 1200  Gross per 24 hour   Intake 582 ml   Output 1950 ml   Net -1368 ml          Physical Exam  Nursing note reviewed.   Constitutional:       Appearance: Normal appearance.   HENT:      Head: Normocephalic.   Eyes:      Extraocular Movements: Extraocular movements intact.      Pupils: Pupils are equal, round, and reactive to light.   Cardiovascular:      Rate and Rhythm: Normal rate and regular rhythm.      Pulses: Normal pulses.      Heart sounds: Normal heart sounds. No murmur heard.    No friction rub. No gallop.   Pulmonary:      Effort: Pulmonary effort is normal. No respiratory distress.      Breath sounds: No rales.   Chest:       Chest wall: No tenderness.   Abdominal:      General: Abdomen is flat.      Palpations: Abdomen is soft.      Tenderness: There is no abdominal tenderness. There is no guarding or rebound.   Musculoskeletal:      Cervical back: Normal range of motion and neck supple.      Right lower leg: No edema.      Left lower leg: No edema.   Skin:     General: Skin is warm and dry.   Neurological:      General: No focal deficit present.      Mental Status: He is alert and oriented to person, place, and time.   Psychiatric:         Mood and Affect: Mood normal.         Behavior: Behavior normal.         Thought Content: Thought content normal.         Judgment: Judgment normal.          Vents:     Lines/Drains/Airways       Peripheral Intravenous Line  Duration                  Peripheral IV - Single Lumen 06/28/23 1159 20 G;1 3/4 in Left Forearm 1 day         Peripheral IV - Single Lumen 06/29/23 1112 20 G Anterior;Distal;Left Upper Arm <1 day                  Significant Labs:    CBC/Anemia Profile:  Recent Labs   Lab 06/28/23  2358 06/29/23  0606 06/29/23  1111   WBC 9.13 10.88 12.61   HGB 6.9* 8.1* 8.4*   HCT 21.1* 24.6* 25.4*   PLT 83* 105* 118*   MCV 87 88 88   RDW 17.4* 16.9* 16.6*        Chemistries:  Recent Labs   Lab 06/28/23  0459 06/29/23  0134    139   K 3.2* 4.0    110   CO2 22* 25   BUN 22 17   CREATININE 0.9 0.9   CALCIUM 7.2* 7.4*   ALBUMIN 2.6* 2.4*   PROT 4.4* 4.2*   BILITOT 0.6 0.3   ALKPHOS 37* 39*   ALT 12 12   AST 24 25   MG 1.9 1.8   PHOS 3.0 1.9*       All pertinent labs within the past 24 hours have been reviewed.    Significant Imaging:  I have reviewed all pertinent imaging results/findings within the past 24 hours.

## 2023-06-30 VITALS
HEIGHT: 72 IN | SYSTOLIC BLOOD PRESSURE: 124 MMHG | DIASTOLIC BLOOD PRESSURE: 63 MMHG | TEMPERATURE: 99 F | WEIGHT: 185.19 LBS | HEART RATE: 77 BPM | OXYGEN SATURATION: 97 % | BODY MASS INDEX: 25.08 KG/M2 | RESPIRATION RATE: 20 BRPM

## 2023-06-30 LAB
ALBUMIN SERPL BCP-MCNC: 2.5 G/DL (ref 3.5–5.2)
ALP SERPL-CCNC: 42 U/L (ref 55–135)
ALT SERPL W/O P-5'-P-CCNC: 14 U/L (ref 10–44)
ANION GAP SERPL CALC-SCNC: 9 MMOL/L (ref 8–16)
AST SERPL-CCNC: 23 U/L (ref 10–40)
BASOPHILS # BLD AUTO: 0.02 K/UL (ref 0–0.2)
BASOPHILS # BLD AUTO: 0.02 K/UL (ref 0–0.2)
BASOPHILS NFR BLD: 0.2 % (ref 0–1.9)
BASOPHILS NFR BLD: 0.2 % (ref 0–1.9)
BILIRUB SERPL-MCNC: 0.7 MG/DL (ref 0.1–1)
BUN SERPL-MCNC: 11 MG/DL (ref 8–23)
CALCIUM SERPL-MCNC: 8.1 MG/DL (ref 8.7–10.5)
CHLORIDE SERPL-SCNC: 106 MMOL/L (ref 95–110)
CO2 SERPL-SCNC: 26 MMOL/L (ref 23–29)
CREAT SERPL-MCNC: 0.9 MG/DL (ref 0.5–1.4)
DIFFERENTIAL METHOD: ABNORMAL
DIFFERENTIAL METHOD: ABNORMAL
EOSINOPHIL # BLD AUTO: 0.1 K/UL (ref 0–0.5)
EOSINOPHIL # BLD AUTO: 0.1 K/UL (ref 0–0.5)
EOSINOPHIL NFR BLD: 1 % (ref 0–8)
EOSINOPHIL NFR BLD: 1 % (ref 0–8)
ERYTHROCYTE [DISTWIDTH] IN BLOOD BY AUTOMATED COUNT: 16.9 % (ref 11.5–14.5)
ERYTHROCYTE [DISTWIDTH] IN BLOOD BY AUTOMATED COUNT: 17.2 % (ref 11.5–14.5)
EST. GFR  (NO RACE VARIABLE): >60 ML/MIN/1.73 M^2
GLUCOSE SERPL-MCNC: 95 MG/DL (ref 70–110)
HCT VFR BLD AUTO: 22.9 % (ref 40–54)
HCT VFR BLD AUTO: 24.8 % (ref 40–54)
HGB BLD-MCNC: 7.6 G/DL (ref 14–18)
HGB BLD-MCNC: 8.1 G/DL (ref 14–18)
HYPOCHROMIA BLD QL SMEAR: ABNORMAL
IMM GRANULOCYTES # BLD AUTO: 0.05 K/UL (ref 0–0.04)
IMM GRANULOCYTES # BLD AUTO: 0.06 K/UL (ref 0–0.04)
IMM GRANULOCYTES NFR BLD AUTO: 0.5 % (ref 0–0.5)
IMM GRANULOCYTES NFR BLD AUTO: 0.6 % (ref 0–0.5)
LYMPHOCYTES # BLD AUTO: 0.9 K/UL (ref 1–4.8)
LYMPHOCYTES # BLD AUTO: 1 K/UL (ref 1–4.8)
LYMPHOCYTES NFR BLD: 10.2 % (ref 18–48)
LYMPHOCYTES NFR BLD: 9.2 % (ref 18–48)
MAGNESIUM SERPL-MCNC: 2 MG/DL (ref 1.6–2.6)
MCH RBC QN AUTO: 28.9 PG (ref 27–31)
MCH RBC QN AUTO: 29.1 PG (ref 27–31)
MCHC RBC AUTO-ENTMCNC: 32.7 G/DL (ref 32–36)
MCHC RBC AUTO-ENTMCNC: 33.2 G/DL (ref 32–36)
MCV RBC AUTO: 88 FL (ref 82–98)
MCV RBC AUTO: 89 FL (ref 82–98)
MONOCYTES # BLD AUTO: 3.2 K/UL (ref 0.3–1)
MONOCYTES # BLD AUTO: 3.5 K/UL (ref 0.3–1)
MONOCYTES NFR BLD: 33.6 % (ref 4–15)
MONOCYTES NFR BLD: 34.2 % (ref 4–15)
NEUTROPHILS # BLD AUTO: 5.3 K/UL (ref 1.8–7.7)
NEUTROPHILS # BLD AUTO: 5.5 K/UL (ref 1.8–7.7)
NEUTROPHILS NFR BLD: 53.9 % (ref 38–73)
NEUTROPHILS NFR BLD: 55.4 % (ref 38–73)
NRBC BLD-RTO: 0 /100 WBC
NRBC BLD-RTO: 0 /100 WBC
PHOSPHATE SERPL-MCNC: 3.2 MG/DL (ref 2.7–4.5)
PLATELET # BLD AUTO: 130 K/UL (ref 150–450)
PLATELET # BLD AUTO: 140 K/UL (ref 150–450)
PLATELET BLD QL SMEAR: ABNORMAL
PMV BLD AUTO: 11.4 FL (ref 9.2–12.9)
PMV BLD AUTO: 11.5 FL (ref 9.2–12.9)
POTASSIUM SERPL-SCNC: 4.1 MMOL/L (ref 3.5–5.1)
PROT SERPL-MCNC: 4.9 G/DL (ref 6–8.4)
RBC # BLD AUTO: 2.61 M/UL (ref 4.6–6.2)
RBC # BLD AUTO: 2.8 M/UL (ref 4.6–6.2)
SODIUM SERPL-SCNC: 141 MMOL/L (ref 136–145)
WBC # BLD AUTO: 10.18 K/UL (ref 3.9–12.7)
WBC # BLD AUTO: 9.64 K/UL (ref 3.9–12.7)

## 2023-06-30 PROCEDURE — 99239 PR HOSPITAL DISCHARGE DAY,>30 MIN: ICD-10-PCS | Mod: ,,, | Performed by: HOSPITALIST

## 2023-06-30 PROCEDURE — 99232 SBSQ HOSP IP/OBS MODERATE 35: CPT | Mod: ,,,

## 2023-06-30 PROCEDURE — 85025 COMPLETE CBC W/AUTO DIFF WBC: CPT | Performed by: NURSE PRACTITIONER

## 2023-06-30 PROCEDURE — 99232 PR SUBSEQUENT HOSPITAL CARE,LEVL II: ICD-10-PCS | Mod: ,,,

## 2023-06-30 PROCEDURE — 99239 HOSP IP/OBS DSCHRG MGMT >30: CPT | Mod: ,,, | Performed by: HOSPITALIST

## 2023-06-30 PROCEDURE — 25000003 PHARM REV CODE 250: Performed by: HOSPITALIST

## 2023-06-30 PROCEDURE — 25000003 PHARM REV CODE 250: Performed by: NURSE PRACTITIONER

## 2023-06-30 PROCEDURE — 97116 GAIT TRAINING THERAPY: CPT | Mod: CQ

## 2023-06-30 PROCEDURE — 80053 COMPREHEN METABOLIC PANEL: CPT | Performed by: HOSPITALIST

## 2023-06-30 PROCEDURE — 84100 ASSAY OF PHOSPHORUS: CPT | Performed by: HOSPITALIST

## 2023-06-30 PROCEDURE — 97530 THERAPEUTIC ACTIVITIES: CPT | Mod: CQ

## 2023-06-30 PROCEDURE — 36415 COLL VENOUS BLD VENIPUNCTURE: CPT | Performed by: HOSPITALIST

## 2023-06-30 PROCEDURE — 83735 ASSAY OF MAGNESIUM: CPT | Performed by: HOSPITALIST

## 2023-06-30 PROCEDURE — 36415 COLL VENOUS BLD VENIPUNCTURE: CPT | Performed by: NURSE PRACTITIONER

## 2023-06-30 RX ADMIN — GABAPENTIN 300 MG: 300 CAPSULE ORAL at 08:06

## 2023-06-30 RX ADMIN — POTASSIUM & SODIUM PHOSPHATES POWDER PACK 280-160-250 MG 2 PACKET: 280-160-250 PACK at 06:06

## 2023-06-30 RX ADMIN — PRAVASTATIN SODIUM 40 MG: 40 TABLET ORAL at 08:06

## 2023-06-30 RX ADMIN — POTASSIUM & SODIUM PHOSPHATES POWDER PACK 280-160-250 MG 2 PACKET: 280-160-250 PACK at 12:06

## 2023-06-30 NOTE — PLAN OF CARE
Problem: Adult Inpatient Plan of Care  Goal: Plan of Care Review  Outcome: Ongoing, Progressing  Goal: Patient-Specific Goal (Individualized)  Outcome: Ongoing, Progressing  Goal: Absence of Hospital-Acquired Illness or Injury  Outcome: Ongoing, Progressing  Goal: Optimal Comfort and Wellbeing  Outcome: Ongoing, Progressing  Goal: Readiness for Transition of Care  Outcome: Ongoing, Progressing     Problem: Skin Injury Risk Increased  Goal: Skin Health and Integrity  Outcome: Ongoing, Progressing     Problem: Fall Injury Risk  Goal: Absence of Fall and Fall-Related Injury  Outcome: Ongoing, Progressing  Intervention: Identify and Manage Contributors  Flowsheets (Taken 6/30/2023 1150)  Self-Care Promotion:   independence encouraged   BADL personal objects within reach  Medication Review/Management: medications reviewed    PT alert and oriented x 4. Able to voice all wants and needs. All needs met.  He has remained free of falls and injuries. Safety eduction and plan of care reviewed with PT and he voiced understanding. Bed is low and locked with call light with in easy reach. Bed alarm set.

## 2023-06-30 NOTE — ASSESSMENT & PLAN NOTE
89 year old male with CAD on daily ASA, HLD, GERD and neuropathy who presented to OSH with BRBPR hypotensive with SBP 90s. CTA with active distal descending and proximal sigmoid colon bleed. He was given 1g Rocephin, 1L IVF, and 3u PRBC and transfer requested to [facility] for IR intervention. Has thus far received 8 Units PRBC, 2 Units FFP, 1 Unit of Plt     -- IR consulted. No site of active bleeding seen on angiogram for possible embolization 6/26. Colonoscopy showing multiple polyps without evidence of active bleeding.   -- if further bleeding reach out to GI. If requires further PRBC should be transfused a Unit of platelets and 2 Units of FFP.   -- CBC q8h and trend   -- Maintain current type and screen   -- Transfuse for hgb < 7   -- Maintain large bore IV access   -- MAP > 65 goal.     6/29  Patient's with Normocytic anemia.. Hemoglobin stable. Etiology likely due to acute blood loss .  Current CBC reviewed-    Recent Labs   Lab 06/29/23  1801 06/29/23  2249 06/30/23  0411   HGB 7.8* 7.6* 7.6*         Component Value Date/Time    MCV 88 06/30/2023 0411    RDW 16.9 (H) 06/30/2023 0411    IRON 53 12/07/2022 1436    FERRITIN 23 12/07/2022 1436    PVXLAOWU25 925 12/07/2022 1436    OCCULTBLOOD Positive (A) 06/25/2023 1551     Monitor CBC and transfuse if H/H drops below 7/21.     6/29 Transfer to hospital medicine . H/o GERD who presents with multiple episodes of bleeding per rectum and hypotension s/p 3 units of blood - Likely diverticular bleed as with    known diverticular disease and identified active extrav in sigmoid on CTA but not detectable by time of IR angiography, no embolization performed. s/p GI eval  - Colonoscopy 6/27 showed old blood in colon but no active bleeding. Suspect diverticular source.   6/30 Hb stable at 7.6 since last night. OK to discharge from GI perspective off ASA.

## 2023-06-30 NOTE — ASSESSMENT & PLAN NOTE
Blood countshave remained stable with no further episodes of hematochezia.     --Cbc as clinically indicated  --Close outpatient followup with PCP to monitor blood counts.  --Strict ER precautions reviewed with patient.

## 2023-06-30 NOTE — PT/OT/SLP PROGRESS
Physical Therapy Treatment    Patient Name:  Zara Gonzáles   MRN:  [id number]    Recommendations:     Discharge Recommendations: home  Discharge Equipment Recommendations: other (see comments)  Barriers to discharge: None    Assessment:     Zara Gonzáles is a 89 y.o. male admitted with a medical diagnosis of Lower GI bleed.  He presents with the following impairments/functional limitations: impaired endurance, impaired functional mobility, gait instability, decreased lower extremity function.    Rehab Prognosis: Good; patient would benefit from acute skilled PT services to address these deficits and reach maximum level of function.    Recent Surgery: Procedure(s) (LRB):  COLONOSCOPY (N/A) 3 Days Post-Op    Plan:     During this hospitalization, patient to be seen 3 x/week to address the identified rehab impairments via gait training, therapeutic activities, therapeutic exercises, neuromuscular re-education and progress toward the following goals:    Plan of Care Expires:  07/28/23    Subjective     Chief Complaint: none verbalized  Patient/Family Comments/goals: to get better  Pain/Comfort:  Pain Rating 1: 0/10      Objective:     Communicated with RN prior to session.  Patient found HOB elevated with telemetry upon PT entry to room.     General Precautions: Standard, fall  Orthopedic Precautions: N/A  Braces: N/A  Respiratory Status: Room air     Functional Mobility:  Bed Mobility:     Supine to Sit: supervision  Sit to Supine: supervision  Transfers:     Sit to Stand:  supervision with no AD  Gait: Pt ambulated 200' SBA no AD. Pt demonstrated unsteady gait  Stairs: Pt ascending/descending 10 stairs with CGA and L HR    AM-PAC 6 CLICK MOBILITY  Turning over in bed (including adjusting bedclothes, sheets and blankets)?: 4  Sitting down on and standing up from a chair with arms (e.g., wheelchair, bedside commode, etc.): 4  Moving from lying on back to sitting on the side of the bed?: 4  Moving to and from a bed  to a chair (including a wheelchair)?: 4  Need to walk in hospital room?: 3  Climbing 3-5 steps with a railing?: 3  Basic Mobility Total Score: 22       Treatment & Education:  Pt educated on:  - Role of PT and POC/goals for therapy   - Safety with mobility and fall risk   - Instructed to call nursing staff for assistance with mobility as needed   -Tips to reduce fall risk  -energy conservation techniques      Patient left HOB elevated with all lines intact, call button in reach, and RN notified..    GOALS:   Multidisciplinary Problems       Physical Therapy Goals          Problem: Physical Therapy    Goal Priority Disciplines Outcome Goal Variances Interventions   Physical Therapy Goal     PT, PT/OT Ongoing, Progressing     Description: Goals to be met by:      Patient will increase functional independence with mobility by performin. Supine to sit with [place]  2. Gait  x 500 feet with Supervision using No Assistive Device.   3. Ascend/descend 8 stair with right Handrails Supervision using No Assistive Device.                          Time Tracking:     PT Received On: 23  PT Start Time: 902     PT Stop Time: 925  PT Total Time (min): 23 min     Billable Minutes: Gait Training 13 mins and Therapeutic Activity 10 mins    Treatment Type: Treatment  PT/PTA: PTA     Number of PTA visits since last PT visit: 2023

## 2023-06-30 NOTE — DISCHARGE SUMMARY
Dhiraj Sun - Telemetry [facility] Medicine  Discharge Summary      Patient Name: Zara Gonzáles  MRN: [id number]  CELSO: [id number]  Patient Class: IP- Inpatient  Admission Date: 6/26/2023  Hospital Length of Stay: 4 days  Discharge Date and Time:  06/30/2023 8:02 PM  Attending Physician: Jeronimo Del Rosario MD   Discharging Provider: Jeronimo Del Rosario MD  Primary Care Provider: Judd Honeycutt MD  Hospital Medicine Team: [facility] HOSP MED R Jeronimo Del Rosario MD  Primary Care Team: [facility] R    HPI:   Mr. Gonzáles is am 89 year old male with PMH notable for GERN, HLD, CAD, and neuropathy who presented to  [facility] with BRBPR and associated dizziness. Per chart review patient reports multiple blood bowel movements and had multiple while in the OSH ED. He arrived hypotensive with SBP in the 90s. In the ED he was given IVF a 3u PRBC. His most recent hemoglobin prior to transfer was 8.6. CTA at OSH: There is new high attenuation material in the distal descending and proximal sigmoid colon consistent with an active lower GI bleed in the distal descending colon. Patient denies any anticoagulation but does take ASA daily.      Notably, he had a recent Upper GI series: WNL. Colonoscopy with diverticulosis and internal hemorrhoids. Critical care medicine at [facility] Dhiraj Sun consulted with plan for IR intervention upon arrival. At the time of transfer he is hemodynamically stable without need for vasopressor support.              Procedure(s) (LRB):  COLONOSCOPY (N/A)      Hospital Course:       Presented from OSH with lower GI bleed for IR embolization. CTA showed signs active extravasation. Angiogram did not show active bleeding. Pt had acute bleed requiring blood products and short duration of vasopressors. Pt had colonoscopy the following day with multiple polyps noted on exam, without removal or evidence of active bleeding. Has received 5 Units PRBC, 2 units FFP and 1 unit Plts at our facility, 3 units  PRBC at outside hospital    6/29 Transfer to hospital medicine . H/o GERD who presents with multiple episodes of bleeding per rectum and hypotension s/p 3 units of blood - Likely diverticular bleed as with    known diverticular disease and identified active extrav in sigmoid on CTA but not detectable by time of IR angiography, no embolization performed. s/p GI eval  - Colonoscopy 6/27 showed old blood in colon but no active bleeding. Suspect diverticular source.   Diverticulosis in the sigmoid colon and in the  descending colon.                          - One 15 mm polyp in the cecum. Resection not                          attempted.                          - Three 3 to 7 mm polyps at the hepatic flexure                          and in the ascending colon. Resection not                          attempted.   No recurrent  bleeding since colonoscopy. advance diet as tolerated If becomes hemodynamically unstable with associated large volume hematochezia, recommend STAT CTA and IR embolization if positive .  Hb stable at 8.1. P replaced   6/30 Hb stable at 7.6 since last night. OK to discharge from GI perspective off ASA. orthostatics today       Goals of Care Treatment Preferences:  Code Status: Full Code      Consults:   Consults (From admission, onward)          Status Ordering Provider     Inpatient consult to Midline team  Once        Provider:  (Not yet assigned)    Completed IVONNE SARGENT     Inpatient consult to Gastroenterology  Once        Provider:  (Not yet assigned)    Completed JEWEL WARREN-ON     Inpatient consult to Interventional Radiology  Once        Provider:  (Not yet assigned)    Completed JEWEL WARREN-ON          Assessment/Plan:      * Lower GI bleed  as below     Hypophosphatemia  replaced         Diverticulosis of colon     6/29   s/p GI eval  - Colonoscopy 6/27 showed old blood in colon but no active bleeding. Suspect diverticular source.   Diverticulosis in the sigmoid colon and in the   descending colon.                          - One 15 mm polyp in the cecum. Resection not                          attempted.                          - Three 3 to 7 mm polyps at the hepatic flexure                          and in the ascending colon. Resection not                          attempted.   No recurrent  bleeding since colonoscopy. advance diet as tolerated If becomes hemodynamically unstable with associated large volume hematochezia, recommend STAT CTA and IR embolization if positive .  Hb stable at 8.1. P replaced         CAD (coronary artery disease)     -- Holding ASA given GIB at this time. Will resume when clinically indicated   -- Continue home statin   -- SBP < 180 goal. Currently normotensive. Will hold home antihypertensives in setting of acute GIB         Acute blood loss anemia     89 year old male with CAD on daily ASA, HLD, GERD and neuropathy who presented to OSH with BRBPR hypotensive with SBP 90s. CTA with active distal descending and proximal sigmoid colon bleed. He was given 1g Rocephin, 1L IVF, and 3u PRBC and transfer requested to [facility] for IR intervention. Has thus far received 8 Units PRBC, 2 Units FFP, 1 Unit of Plt     -- IR consulted. No site of active bleeding seen on angiogram for possible embolization 6/26. Colonoscopy showing multiple polyps without evidence of active bleeding.   -- if further bleeding reach out to GI. If requires further PRBC should be transfused a Unit of platelets and 2 Units of FFP.   -- CBC q8h and trend   -- Maintain current type and screen   -- Transfuse for hgb < 7   -- Maintain large bore IV access   -- MAP > 65 goal.     6/29  Patient's with Normocytic anemia.. Hemoglobin stable. Etiology likely due to acute blood loss .        Current CBC reviewed-  Recent Labs   Lab 06/28/23  1852 06/28/23  2358 06/29/23  0606   HGB 7.2* 6.9* 8.1*                Component Value Date/Time     MCV 88 06/29/2023 0606     RDW 16.9 (H) 06/29/2023 0606     IRON 53  12/07/2022 1436     FERRITIN 23 12/07/2022 1436     [id number] 925 12/07/2022 1436     OCCULTBLOOD Positive (A) 06/25/2023 1551      Monitor CBC and transfuse if H/H drops below 7/21.      6/29 Transfer to hospital medicine . H/o GERD who presents with multiple episodes of bleeding per rectum and hypotension s/p 3 units of blood - Likely diverticular bleed as with    known diverticular disease and identified active extrav in sigmoid on CTA but not detectable by time of IR angiography, no embolization performed. s/p GI eval  - Colonoscopy 6/27 showed old blood in colon but no active bleeding. Suspect diverticular source.         Gastroesophageal reflux disease without esophagitis  no active issues on EGD          Mixed hyperlipidemia      Continue home statin therapy         Final Active Diagnoses:    Diagnosis Date Noted POA    PRINCIPAL PROBLEM:  Lower GI bleed [K92.2] 06/25/2023 Yes    Hypophosphatemia [E83.39] 06/29/2023 Yes    Diverticulosis of colon [K57.30] 06/26/2023 Yes    Abnormal CT scan, colon [R93.3] 06/26/2023 Yes    CAD (coronary artery disease) [I25.10] 06/25/2023 Yes    Acute blood loss anemia [D62] 04/03/2023 Yes    Gastroesophageal reflux disease without esophagitis [K21.9] 05/20/2017 Yes    Mixed hyperlipidemia [E78.2]  Yes      Problems Resolved During this Admission:    Diagnosis Date Noted Date Resolved POA    Hemorrhagic shock [R57.8] 06/26/2023 06/28/2023 Yes       Medications:  Reconciled Home Medications:      Medication List        Continue taking these medications      CENTRUM SILVER MEN ORAL  Take 1 tablet by mouth once daily.     ferrous sulfate 325 mg (65 mg iron) Tab tablet  Commonly known as: FEOSOL  Take 1 tablet (325 mg total) by mouth every Mon, Wed, Fri.     gabapentin 300 MG capsule  Commonly known as: NEURONTIN  Take 1 capsule (300 mg total) by mouth 2 (two) times daily.     nitroGLYCERIN 0.4 MG SL tablet  Commonly known as: NITROSTAT  Place 1 tablet (0.4 mg total) under the  tongue every 5 (five) minutes as needed for Chest pain.     pravastatin 40 MG tablet  Commonly known as: PRAVACHOL  TAKE 1 TABLET(40 MG) BY MOUTH EVERY DAY     TEARS LUBRICANT EYE DROP 0.5 % ophthalmic solution  Generic drug: artificial tears  Place 1 drop into both eyes daily as needed (dry eye (s)).            Stop taking these medications      acetaminophen 500 MG tablet  Commonly known as: TYLENOL     aspirin 81 MG EC tablet  Commonly known as: ECOTRIN     diltiaZEM 60 MG tablet  Commonly known as: CARDIZEM                Discharged Condition: fair    Disposition: Home or Self Care             Follow Up:     Patient Instructions:   No discharge procedures on file.    Laboratory/Diagnostic Data:    CBC/Anemia Labs: Coags:    Recent Labs   Lab 06/25/23  1551 06/25/23  1752 06/29/23  1801 06/29/23  2249 06/30/23  0411   WBC  --    < > 11.44 11.02 10.18   HGB  --    < > 7.8* 7.6* 7.6*   HCT  --    < > 24.1* 23.6* 22.9*   PLT  --    < > 125* 134* 130*   MCV  --    < > 88 88 88   RDW  --    < > 16.9* 16.6* 16.9*   OCCULTBLOOD Positive*  --   --   --   --     < > = values in this interval not displayed.    Recent Labs   Lab 06/25/23  1510 06/26/23  0056   INR 1.1 1.1   APTT  --  27.7        Chemistries: ABG:   Recent Labs   Lab 06/27/23  0320 06/28/23  0459 06/29/23  0134    141 139   K 3.9 3.2* 4.0   * 110 110   CO2 20* 22* 25   BUN 22 22 17   CREATININE 1.0 0.9 0.9   CALCIUM 7.5* 7.2* 7.4*   PROT 4.6* 4.4* 4.2*   BILITOT 1.0 0.6 0.3   ALKPHOS 40* 37* 39*   ALT 10 12 12   AST 17 24 25   MG 1.8 1.9 1.8   PHOS 3.2 3.0 1.9*    No results for input(s): PH, PCO2, PO2, HCO3, POCSATURATED, BE in the last 168 hours.     POCT Glucose: HbA1c:    No results for input(s): POCTGLUCOSE in the last 168 hours. Hemoglobin A1C   Date Value Ref Range Status   10/12/2020 5.4 4.0 - 5.6 % Final     Comment:     ADA Screening Guidelines:  5.7-6.4%  Consistent with prediabetes  >or=6.5%  Consistent with diabetes  High levels of  fetal hemoglobin interfere with the HbA1C  assay. Heterozygous hemoglobin variants (HbS, HgC, etc)do  not significantly interfere with this assay.   However, presence of multiple variants may affect accuracy.          Cardiac Enzymes: Ejection Fractions:    No results for input(s): CPK, CPKMB, MB, TROPONINI in the last 72 hours. EF   Date Value Ref Range Status   06/13/2023 60 % Final   11/25/2022 55 % Final     Nuc Stress EF   Date Value Ref Range Status   11/26/2022 62 % Final          No results for input(s): COLORU, APPEARANCEUA, PHUR, SPECGRAV, PROTEINUA, GLUCUA, KETONESU, BILIRUBINUA, OCCULTUA, NITRITE, UROBILINOGEN, LEUKOCYTESUR, RBCUA, WBCUA, BACTERIA, SQUAMEPITHEL, HYALINECASTS in the last 48 hours.    Invalid input(s): WRIGHTSUR    Procalcitonin (ng/mL)   Date Value   06/25/2023 0.06     Lactate (Lactic Acid) (mmol/L)   Date Value   06/25/2023 1.2     BNP (pg/mL)   Date Value   06/26/2023 31     No results found for: CRP, SEDRATE  No results found for: DDIMER  Ferritin (ng/mL)   Date Value   12/07/2022 23     No results found for: LDH  Troponin I (ng/mL)   Date Value   04/02/2023 <0.012   11/25/2022 <0.012   11/25/2022 <0.012   11/25/2022 <0.012   11/25/2022 <0.012   07/10/2019 <0.012   04/17/2018 <0.012     No results found for this or any previous visit.  SARS-CoV2 (COVID-19) Qualitative PCR (no units)   Date Value   04/02/2023 Negative     SARS-CoV-2 RNA, Amplification, Qual (no units)   Date Value   11/25/2022 Negative       Microbiology labs for the last week  Microbiology Results (last 7 days)       ** No results found for the last 168 hours. **              Reviewed and noted in plan where applicable- Please see chart for full lab data.    Lines/Drains:       Peripheral IV - Single Lumen 06/28/23 1159 20 G;1 3/4 in Left Forearm (Active)   Site Assessment Dry;Intact 06/29/23 1500   Extremity Assessment Distal to IV No swelling;No warmth 06/29/23 0301   Line Status Saline locked;Flushed 06/29/23 1500    Dressing Status Dry;New drainage 06/29/23 1500   Dressing Intervention Integrity maintained 06/29/23 1500   Dressing Change Due 07/02/23 06/29/23 0301   Site Change Due 07/02/23 06/28/23 1901   Reason Not Rotated Not due 06/29/23 1500   Number of days: 1            Peripheral IV - Single Lumen 06/29/23 1112 20 G Anterior;Distal;Left Upper Arm (Active)   Number of days: 0       Imaging      Results for orders placed during the hospital encounter of 06/13/23    Echo    Interpretation Summary  · Overall the study quality was poor.  · The left ventricle is normal in size with concentric remodeling and normal systolic function.  · The estimated ejection fraction is 55 to 60%.  · LV walls not well visualized due to poor sonic windows, but no gross wall motion abnormalities noted.  · Normal left ventricular diastolic function.  · Normal right ventricular size with normal right ventricular systolic function.  · Mild aortic regurgitation.  · Moderate mitral regurgitation.  · Moderate tricuspid regurgitation.  · Mild pulmonic regurgitation.  · Normal central venous pressure (3 mmHg).  · The estimated PA systolic pressure is 30 mmHg.  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      IR Angiogram Visceral  Narrative: EXAMINATION:  Mesenteric angiography    Procedural Personnel    Attending physician(s): Shyam Roa MD    Fellow physician(s): Melissa Chapman MD    Resident physician(s): None    Advanced practice provider(s): None    Pre-procedure diagnosis: Lower GI hemorrhage    Post-procedure diagnosis: Same    Indication: Gastrointestinal bleeding    Additional clinical history:    Complications: No immediate complications.    CLINICAL HISTORY:  89-year-old male with history of cardiovascular disease on aspirin presents with spontaneous lower GI hemorrhage.  IR evaluation requested.    TECHNIQUE:  - Arterial puncture with ultrasound guidance    - Selective mesenteric angiography: Superior mesenteric and inferior mesenteric  angiography    - Superselective mesenteric angiography: Performed as described below    - Additional procedure(s): None    FINDINGS:  Pre-procedure    Consent: Informed consent for the procedure was obtained and time-out was performed prior to the procedure.    Preparation: The site was prepared and draped using maximal sterile barrier technique including cutaneous antisepsis.    Anesthesia/sedation    Level of anesthesia/sedation: Moderate sedation (conscious sedation)    Anesthesia/sedation administered by: Independent trained observer under attending supervision with continuous monitoring of the patient's level of consciousness and physiologic status    Total intra-service sedation time (minutes): 26    Access    Local anesthesia was administered. The vessel was sonographically evaluated and judged to be patent. Real time ultrasound was used to visualize needle entry into the vessel and a permanent image was stored. A 5 Guinean sheath was placed.    Vessel accessed: Right common femoral artery    Access technique: Micropuncture set with 21 gauge needle    Mesenteric angiography    The mesenteric arterial system was catheterized using 5 Guinean reverse curve catheter and 2.8 Guinean microcatheter.    Variant anatomy: None    Vessel catheterized: Superior mesenteric artery    Digital subtraction angiography demonstrates patency without evidence of stenosis, occlusion, or arterial injury.    Vessel catheterized: Inferior mesenteric artery    Digital subtraction angiography demonstrates patency without evidence of stenosis, occlusion, or arterial injury.    Vessel catheterized: Left colic artery    Digital subtraction angiography demonstrates patency without evidence of occlusion or arterial injury.  Vasospasm of ascending left colic artery branches noted.  There is no evidence of active extravasation.    Vessel catheterized: Sigmoid artery    Digital subtraction angiography demonstrates patency without evidence of  occlusion or arterial injury.  There is no evidence of active extravasation.    Closure    Access site angiography performed: Yes    Patent vessel with appropriate access level    Arterial closure technique: Angioseal    Hemostasis achieved from closure technique: Yes    Duration of manual compression (minutes): 2    Contrast    Contrast agent: Omnipaque 300    Contrast volume (mL): 60    Radiation Dose    Fluoroscopy time ( minutes): 11.3    Reference air kerma ( mGy): 150    Kerma area product ( uGy-m2): 4208    Additional Details    Additional description of procedure: None    Equipment details: None    Specimens removed: None    Estimated blood loss (mL): Less than 10    Standardized report: [email or web address]    Attestation    Signer name: Shyam Roa MD    I attest that I was present for the entire procedure. I reviewed the stored images and agree with the report as written.  Impression: Mesenteric angiography performed via superior mesenteric and inferior mesenteric artery branches, as above.  No evidence of active extravasation.  No embolization was performed.    Plan:    Care per ICU team.  Recommend GI consultation and colonoscopy if patient rebleeds.    _______________________________________________________________    Electronically signed by resident: Melissa Chapman  Date:    06/26/2023  Time:    12:27    Electronically signed by: Shyam Roa  Date:    06/26/2023  Time:    13:27  CTA Acute GI Sonterra, Abdomen and Pelvis  Narrative: EXAMINATION:  CT a of the abdomen and pelvis with coronal and sagittal MIP images    CLINICAL HISTORY:  GI bleed    TECHNIQUE:  Patient was injected with 80 cc of Omnipaque 350    COMPARISON:  None    FINDINGS:  There are increased markings in the lung bases felt to be chronic.  Liver appears normal.  Spleen appears normal.  Pancreas appears normal.  There is a small cholelithiasis without evidence of acute cholecystitis.  The adrenals are not enlarged.  Kidneys appear  normal.  There is a simple cyst of the left kidney.  Aorta shows no evidence of an aneurysm.  The appendix appears normal.  There are changes consistent with active GI bleeding in the distal descending and proximal sigmoid colon.  Impression: Changes consistent with an active GI bleed in the distal descending and proximal sigmoid colon..    [employer] concordance    Electronically signed by: Eladio Cruz MD  Date:    06/26/2023  Time:    08:16      Imaging:        Follow Up Instructions:     Future Appointments   Date Time Provider Department Center   7/17/2023 10:30 AM Bassam Cosby MD [facility] CARD [facility]   7/20/2023 10:40 AM [facility], NURSE [facility] MED [place]   8/17/2023 10:40 AM [facility], NURSE [facility] [place]   9/14/2023 10:40 AM [facility], NURSE [facility] MED [place]   10/17/2023 10:00 AM Judd Honeycutt MD [facility]       Discharge Instructions:  Discharge Procedure Orders   Ambulatory referral/consult to Outpatient Case Management   Referral Priority: Routine Referral Type: Consultation   Referral Reason: Specialty Services Required   Number of Visits Requested: 1     Ambulatory referral/consult to Gastroenterology   Standing Status: Future   Referral Priority: Routine Referral Type: Consultation   Referral Reason: Specialty Services Required   Requested Specialty: Gastroenterology   Number of Visits Requested: 1         Total time spent on discharge 40  minutes     Jeronimo Del Rosario MD  Attending Staff Physician  [facility]

## 2023-06-30 NOTE — SUBJECTIVE & OBJECTIVE
Subjective:     Interval History: Followed up with patient s/p Colonoscopy. No further episodes of hematochezia. Last bowel movement was while he was doing the golytely prep. Sitting up in bed eating breakfast and reports feeling well. Afebrile. Vitals stable.     Review of Systems   Constitutional:  Negative for activity change, appetite change and fever.   HENT:  Negative for sore throat and trouble swallowing.    Eyes:  Negative for redness.   Gastrointestinal:  Negative for abdominal distention, abdominal pain, anal bleeding, blood in stool (none recently.), constipation, diarrhea, nausea, rectal pain and vomiting.   Skin:  Negative for color change, pallor and rash.   Neurological:  Negative for dizziness, syncope and weakness.   Objective:     Vital Signs (Most Recent):  Temp: 99.7 °F (37.6 °C) (06/30/23 0321)  Pulse: 72 (06/30/23 0733)  Resp: 18 (06/30/23 0733)  BP: (!) 126/58 (06/30/23 0733)  SpO2: 96 % (06/30/23 0733) Vital Signs (24h Range):  Temp:  [98.4 °F (36.9 °C)-99.9 °F (37.7 °C)] 99.7 °F (37.6 °C)  Pulse:  [72-97] 72  Resp:  [13-22] 18  SpO2:  [95 %-100 %] 96 %  BP: ()/(53-60) 126/58     Weight: 84 kg (185 lb 3 oz) (06/26/23 0000)  Body mass index is 25.12 kg/m².      Intake/Output Summary (Last 24 hours) at 6/30/2023 1004  Last data filed at 6/29/2023 1200  Gross per 24 hour   Intake 222 ml   Output 200 ml   Net 22 ml       Lines/Drains/Airways       Peripheral Intravenous Line  Duration                  Peripheral IV - Single Lumen 06/28/23 1159 20 G;1 3/4 in Left Forearm 1 day         Peripheral IV - Single Lumen 06/29/23 1112 20 G Anterior;Distal;Left Upper Arm <1 day                     Physical Exam  Vitals and nursing note reviewed.   Constitutional:       Appearance: He is normal weight. He is not ill-appearing.   HENT:      Mouth/Throat:      Mouth: Mucous membranes are moist.      Pharynx: Oropharynx is clear.   Eyes:      General: No scleral icterus.  Abdominal:      General:  Abdomen is flat. Bowel sounds are normal. There is no distension.      Palpations: Abdomen is soft. There is no mass.      Tenderness: There is no abdominal tenderness.      Hernia: No hernia is present.   Skin:     General: Skin is warm and dry.      Capillary Refill: Capillary refill takes less than 2 seconds.      Coloration: Skin is not jaundiced or pale.      Findings: No bruising or erythema.   Neurological:      Mental Status: He is alert and oriented to person, place, and time. Mental status is at baseline.        Significant Labs:  CBC:   Recent Labs   Lab 06/29/23  1801 06/29/23  2249 06/30/23  0411   WBC 11.44 11.02 10.18   HGB 7.8* 7.6* 7.6*   HCT 24.1* 23.6* 22.9*   * 134* 130*     CMP:   Recent Labs   Lab 06/30/23  0738   GLU 95   CALCIUM 8.1*   ALBUMIN 2.5*   PROT 4.9*      K 4.1   CO2 26      BUN 11   CREATININE 0.9   ALKPHOS 42*   ALT 14   AST 23   BILITOT 0.7         Significant Imaging:  Imaging results within the past 24 hours have been reviewed.

## 2023-06-30 NOTE — PLAN OF CARE
[facility] - Telemetry Stepdown  Discharge Final Note    Primary Care Provider: Judd Honeycutt MD    Expected Discharge Date: 6/30/2023      Future Appointments   Date Time Provider Department Center   7/14/2023  9:30 AM Elva Encarnacion NP [facility] [place]   7/17/2023 10:30 AM Bassam Cosby MD [facility]   7/20/2023 10:40 AM [facility], NURSE [facility] [place]   8/17/2023 10:40 AM [facility], NURSE [facility] [place]   9/14/2023 10:40 AM [facility], NURSE [facility] [place]   10/17/2023 10:00 AM Judd Honeycutt MD [facility]          Final Discharge Note (most recent)       Final Note - 06/30/23 1024          Final Note    Assessment Type Final Discharge Note     Anticipated Discharge Disposition Home-Health Care [facility]     What phone number can be called within the next 1-3 days to see how you are doing after discharge? [phone]     Hospital Resources/Appts/Education Provided Appointments scheduled and added to AVS   Ambulatory referrals sent to GI and OPCM.       Post-Acute Status    Post-Acute Authorization Home Health     Home Health Status Set-up Complete/Auth obtained   [facility]    Discharge Delays None known at this time                     Important Message from Medicare  Important Message from Medicare regarding Discharge Appeal Rights: Given to patient/caregiver, Explained to patient/caregiver, Signed/date by patient/caregiver     Date IMM was signed: 06/30/23  Time IMM was signed: 0846    Contact Info       Judd Honeycutt MD   Specialty: Family Medicine   Relationship: PCP - General    [address] [place] [address]   Phone: [phone]       Next Steps: Go on 7/14/2023    Instructions: Hospital follow up 7/14 at 9:30am            Leticia Diaz RN  Ext [phone]

## 2023-06-30 NOTE — PT/OT/SLP PROGRESS
Occupational Therapy      Patient Name:  Zara Gonzáles   MRN:  [id number]    Patient not seen today secondary to Patient unwilling to participate (2/2 to having PT already and feeling comfortable with d/c later today).     6/30/2023

## 2023-06-30 NOTE — PLAN OF CARE
[facility] - Telemetry Stepdown      HOME HEALTH ORDERS  FACE TO FACE ENCOUNTER    Patient Name: Zara Gonzáles  YOB: 1933    PCP: Judd Honeycutt MD   PCP Address: [address] / [place] [address]  PCP Phone Number: [phone]  PCP Fax: [phone]    Encounter Date: 6/25/23    Admit to Home Health    Diagnoses:  Active Hospital Problems    Diagnosis  POA    *Lower GI bleed [K92.2]  Yes    Hypophosphatemia [E83.39]  Yes    Diverticulosis of colon [K57.30]  Yes    Abnormal CT scan, colon [R93.3]  Yes    CAD (coronary artery disease) [I25.10]  Yes    Acute blood loss anemia [D62]  Yes    Gastroesophageal reflux disease without esophagitis [K21.9]  Yes    Mixed hyperlipidemia [E78.2]  Yes      Resolved Hospital Problems    Diagnosis Date Resolved POA    Hemorrhagic shock [R57.8] 06/28/2023 Yes       Follow Up Appointments:  Future Appointments   Date Time Provider Department Center   7/17/2023 10:30 AM Bassam Cosby MD [facility]   7/20/2023 10:40 AM [facility], NURSE [facility]   8/17/2023 10:40 AM [facility], NURSE [facility]   9/14/2023 10:40 AM [facility], NURSE [facility]   10/17/2023 10:00 AM Judd Honeycutt MD [facility]       Allergies:  Review of patient's allergies indicates:   Allergen Reactions    Sulfa (sulfonamide antibiotics)      rash       Medications: Review discharge medications with patient and family and provide education.    Current Facility-Administered Medications   Medication Dose Route Frequency Provider Last Rate Last Admin    0.9%  NaCl infusion (for blood administration)   Intravenous Q24H PRN Leticia Contreras NP        acetaminophen tablet 650 mg  650 mg Oral Q4H PRN João-On MD Isac        gabapentin capsule 300 mg  300 mg Oral BID Louis Shah NP   300 mg at 06/29/23 2150    ondansetron injection 4 mg  4 mg Intravenous Q8H PRN Zi-On MD Isac   4 mg at 06/26/23 2054    potassium, sodium phosphates  280-160-250 mg packet 2 packet  2 packet Oral QID (AC & HS) Jeronimo Del Rosario MD   2 packet at 06/30/23 0642    pravastatin tablet 40 mg  40 mg Oral Daily Louis Shah NP   40 mg at 06/29/23 0919    prochlorperazine injection Soln 5 mg  5 mg Intravenous Q6H PRN João-Nathan Chau MD        sodium chloride 0.9% flush 10 mL  10 mL Intravenous PRN Zi-On MD Isac         Current Discharge Medication List        CONTINUE these medications which have NOT CHANGED    Details   ferrous sulfate (FEOSOL) 325 mg (65 mg iron) Tab tablet Take 1 tablet (325 mg total) by mouth every Mon, Wed, Fri.  Qty: 36 tablet, Refills: 0      gabapentin (NEURONTIN) 300 MG capsule Take 1 capsule (300 mg total) by mouth 2 (two) times daily.    Associated Diagnoses: Peripheral polyneuropathy      pravastatin (PRAVACHOL) 40 MG tablet TAKE 1 TABLET(40 MG) BY MOUTH EVERY DAY  Qty: 90 tablet, Refills: 1    Associated Diagnoses: Mixed hyperlipidemia      artificial tears (TEARS LUBRICANT EYE DROP) 0.5 % ophthalmic solution Place 1 drop into both eyes daily as needed (dry eye (s)).      multivit-min/FA/lycopen/lutein (CENTRUM SILVER MEN ORAL) Take 1 tablet by mouth once daily.      nitroGLYCERIN (NITROSTAT) 0.4 MG SL tablet Place 1 tablet (0.4 mg total) under the tongue every 5 (five) minutes as needed for Chest pain.  Qty: 30 tablet, Refills: 0           STOP taking these medications       diltiaZEM (CARDIZEM) 60 MG tablet Comments:   Reason for Stopping:         acetaminophen (TYLENOL) 500 MG tablet Comments:   Reason for Stopping:         aspirin (ECOTRIN) 81 MG EC tablet Comments:   Reason for Stopping:                 I have seen and examined this patient within the last 30 days. My clinical findings that support the need for the home health skilled services and home bound status are the following:no   Weakness/numbness causing balance and gait disturbance due to Weakness/Debility making it taxing to leave home.     Diet:   cardiac  diet    Labs:  SN to perform labs:  CBC: Weekly; 1 month(s)    Referrals/ Consults  Physical Therapy to evaluate and treat. Evaluate for home safety and equipment needs; Establish/upgrade home exercise program. Perform / instruct on therapeutic exercises, gait training, transfer training, and Range of Motion.  Occupational Therapy to evaluate and treat. Evaluate home environment for safety and equipment needs. Perform/Instruct on transfers, ADL training, ROM, and therapeutic exercises.  Aide to provide assistance with personal care, ADLs, and vital signs.    Activities:   activity as tolerated    Nursing:   Agency to admit patient within 24 hours of hospital discharge unless specified on physician order or at patient request    SN to complete comprehensive assessment including routine vital signs. Instruct on disease process and s/s of complications to report to MD. Review/verify medication list sent home with the patient at time of discharge  and instruct patient/caregiver as needed. Frequency may be adjusted depending on start of care date.     Skilled nurse to perform up to 3 visits PRN for symptoms related to diagnosis    Notify MD if SBP > 160 or < 90; DBP > 90 or < 50; HR > 120 or < 50; Temp > 101; O2 < 88%;    Ok to schedule additional visits based on staff availability and patient request on consecutive days within the home health episode.    When multiple disciplines ordered:    Start of Care occurs on Sunday - Wednesday schedule remaining discipline evaluations as ordered on separate consecutive days following the start of care.    Thursday SOC -schedule subsequent evaluations Friday and Monday the following week.     Friday - Saturday SOC - schedule subsequent discipline evaluations on consecutive days starting Monday of the following week.    For all post-discharge communication and subsequent orders please contact patient's primary care physician. If unable to reach primary care physician or do not  receive response within 30 minutes, please contact [phone] for clinical staff order clarification    Miscellaneous   Routine Skin for Bedridden Patients: Instruct patient/caregiver to apply moisture barrier cream to all skin folds and wet areas in perineal area daily and after baths and all bowel movements.    Home Health Aide:  Nursing every 48 hours, Physical Therapy every 48 hours, Occupational Therapy every 48 hours, and Home Health Aide every 48 hours    Wound Care Orders  no    I certify that this patient is confined to his home and needs intermittent skilled nursing care, physical therapy, and occupational therapy.

## 2023-06-30 NOTE — NURSING
Patient discharged home with all belongings. Discharge instructions reviewed with patient and daughter- verbalized understanding.

## 2023-06-30 NOTE — PROGRESS NOTES
[facility] - Telemetry [facility] Medicine  Progress Note    Patient Name: Zara Gonzáles  MRN: [id number]  Patient Class: IP- Inpatient   Admission Date: 6/26/2023  Length of Stay: 4 days  Attending Physician: Jeronimo Del Rosario MD  Primary Care Provider: Judd Honeycutt MD        Subjective:     Principal Problem:Lower GI bleed        HPI:  Mr. Gonzáles is am 89 year old male with PMH notable for GERN, HLD, CAD, and neuropathy who presented to  [facility] with BRBPR and associated dizziness. Per chart review patient reports multiple blood bowel movements and had multiple while in the OSH ED. He arrived hypotensive with SBP in the 90s. In the ED he was given IVF a 3u PRBC. His most recent hemoglobin prior to transfer was 8.6. CTA at OSH: There is new high attenuation material in the distal descending and proximal sigmoid colon consistent with an active lower GI bleed in the distal descending colon. Patient denies any anticoagulation but does take ASA daily.      Notably, he had a recent Upper GI series: WNL. Colonoscopy with diverticulosis and internal hemorrhoids. Critical care medicine at [facility] consulted with plan for IR intervention upon arrival. At the time of transfer he is hemodynamically stable without need for vasopressor support.              Overview/Hospital Course:      Presented from OSH with lower GI bleed for IR embolization. CTA showed signs active extravasation. Angiogram did not show active bleeding. Pt had acute bleed requiring blood products and short duration of vasopressors. Pt had colonoscopy the following day with multiple polyps noted on exam, without removal or evidence of active bleeding. Has received 5 Units PRBC, 2 units FFP and 1 unit Plts at our facility, 3 units PRBC at outside hospital    6/29 Transfer to hospital medicine . H/o GERD who presents with multiple episodes of bleeding per rectum and hypotension s/p 3 units of blood - Likely diverticular bleed  as with    known diverticular disease and identified active extrav in sigmoid on CTA but not detectable by time of IR angiography, no embolization performed. s/p GI eval  - Colonoscopy 6/27 showed old blood in colon but no active bleeding. Suspect diverticular source.   Diverticulosis in the sigmoid colon and in the  descending colon.                          - One 15 mm polyp in the cecum. Resection not                          attempted.                          - Three 3 to 7 mm polyps at the hepatic flexure                          and in the ascending colon. Resection not                          attempted.   No recurrent  bleeding since colonoscopy. advance diet as tolerated If becomes hemodynamically unstable with associated large volume hematochezia, recommend STAT CTA and IR embolization if positive .  Hb stable at 8.1. P replaced   6/30 Hb stable at 7.6 since last night. OK to discharge from GI perspective off ASA. orthostatics today          Review of Systems:   Pain scale:   Constitutional:  fever,  chills, headache, vision loss, hearing loss, weight loss, Generalized weakness, falls, loss of smell, loss of taste, poor appetite,  sore throat  Respiratory: cough, shortness of breath.   Cardiovascular: chest pain, dizziness, palpitations, orthopnea, swelling of feet, syncope  Gastrointestinal: nausea, vomiting, abdominal pain, diarrhea, black stool,  blood in stool, change in bowel habits  Genitourinary: hematuria, dysuria, urgency, frequency  Integument/Breast: rash,  pruritis  Hematologic/Lymphatic: easy bruising, lymphadenopathy  Musculoskeletal: arthralgias , myalgias, back pain, neck pain, knee pain  Neurological: confusion, seizures, tremors, slurred speech  Behavioral/Psych:  depression, anxiety, auditory or visual hallucinations     OBJECTIVE:     Physical Exam:  Body mass index is 25.12 kg/m².    Constitutional: Appears well-developed and well-nourished.   Head: Normocephalic and atraumatic.    Neck: Normal range of motion. Neck supple.   Cardiovascular: Normal heart rate.  Regular heart rhythm.  Pulmonary/Chest: Effort normal.   Abdominal: No distension.  No tenderness  Musculoskeletal: Normal range of motion. No edema.   Neurological: Alert and oriented to person, place, and time.   Skin: Skin is warm and dry.   Psychiatric: Normal mood and affect. Behavior is normal.                  Vital Signs  Temp: 99.7 °F (37.6 °C) (06/30/23 0321)  Pulse: 72 (06/30/23 0733)  Resp: 18 (06/30/23 0733)  BP: (Abnormal) 126/58 (06/30/23 0733)  SpO2: 96 % (06/30/23 0733)     24 Hour VS Range    Temp:  [98.4 °F (36.9 °C)-99.9 °F (37.7 °C)]   Pulse:  []   Resp:  [13-31]   BP: ()/(53-90)   SpO2:  [95 %-100 %]     Intake/Output Summary (Last 24 hours) at 6/30/2023 0744  Last data filed at 6/29/2023 1200  Gross per 24 hour   Intake 582 ml   Output 700 ml   Net -118 ml         I/O This Shift:  No intake/output data recorded.    Wt Readings from Last 3 Encounters:   06/26/23 84 kg (185 lb 3 oz)   06/25/23 83 kg (183 lb)   06/19/23 83 kg (183 lb)       I have personally reviewed the vitals and recorded Intake/Output     Laboratory/Diagnostic Data:    CBC/Anemia Labs: Coags:    Recent Labs   Lab 06/25/23  1551 06/25/23  1752 06/29/23  1801 06/29/23  2249 06/30/23  0411   WBC  --    < > 11.44 11.02 10.18   HGB  --    < > 7.8* 7.6* 7.6*   HCT  --    < > 24.1* 23.6* 22.9*   PLT  --    < > 125* 134* 130*   MCV  --    < > 88 88 88   RDW  --    < > 16.9* 16.6* 16.9*   OCCULTBLOOD Positive*  --   --   --   --     < > = values in this interval not displayed.    Recent Labs   Lab 06/25/23  1510 06/26/23  0056   INR 1.1 1.1   APTT  --  27.7        Chemistries: ABG:   Recent Labs   Lab 06/27/23  0320 06/28/23  0459 06/29/23  0134    141 139   K 3.9 3.2* 4.0   * 110 110   CO2 20* 22* 25   BUN 22 22 17   CREATININE 1.0 0.9 0.9   CALCIUM 7.5* 7.2* 7.4*   PROT 4.6* 4.4* 4.2*   BILITOT 1.0 0.6 0.3   ALKPHOS 40* 37* 39*    ALT 10 12 12   AST 17 24 25   MG 1.8 1.9 1.8   PHOS 3.2 3.0 1.9*    No results for input(s): PH, PCO2, PO2, HCO3, POCSATURATED, BE in the last 168 hours.     POCT Glucose: HbA1c:    No results for input(s): POCTGLUCOSE in the last 168 hours. Hemoglobin A1C   Date Value Ref Range Status   10/12/2020 5.4 4.0 - 5.6 % Final     Comment:     ADA Screening Guidelines:  5.7-6.4%  Consistent with prediabetes  >or=6.5%  Consistent with diabetes  High levels of fetal hemoglobin interfere with the HbA1C  assay. Heterozygous hemoglobin variants (HbS, HgC, etc)do  not significantly interfere with this assay.   However, presence of multiple variants may affect accuracy.          Cardiac Enzymes: Ejection Fractions:    No results for input(s): CPK, CPKMB, MB, TROPONINI in the last 72 hours. EF   Date Value Ref Range Status   06/13/2023 60 % Final   11/25/2022 55 % Final     Nuc Stress EF   Date Value Ref Range Status   11/26/2022 62 % Final          No results for input(s): COLORU, APPEARANCEUA, PHUR, SPECGRAV, PROTEINUA, GLUCUA, KETONESU, BILIRUBINUA, OCCULTUA, NITRITE, UROBILINOGEN, LEUKOCYTESUR, RBCUA, WBCUA, BACTERIA, SQUAMEPITHEL, HYALINECASTS in the last 48 hours.    Invalid input(s): WRIGHTSUR    Procalcitonin (ng/mL)   Date Value   06/25/2023 0.06     Lactate (Lactic Acid) (mmol/L)   Date Value   06/25/2023 1.2     BNP (pg/mL)   Date Value   06/26/2023 31     No results found for: CRP, SEDRATE  No results found for: DDIMER  Ferritin (ng/mL)   Date Value   12/07/2022 23     No results found for: LDH  Troponin I (ng/mL)   Date Value   04/02/2023 <0.012   11/25/2022 <0.012   11/25/2022 <0.012   11/25/2022 <0.012   11/25/2022 <0.012   07/10/2019 <0.012   04/17/2018 <0.012     No results found for this or any previous visit.  SARS-CoV2 (COVID-19) Qualitative PCR (no units)   Date Value   04/02/2023 Negative     SARS-CoV-2 RNA, Amplification, Qual (no units)   Date Value   11/25/2022 Negative       Microbiology labs for the  last week  Microbiology Results (last 7 days)       ** No results found for the last 168 hours. **            Reviewed and noted in plan where applicable- Please see chart for full lab data.    Lines/Drains:       Peripheral IV - Single Lumen 06/28/23 1159 20 G;1 3/4 in Left Forearm (Active)   Site Assessment Dry;Intact 06/29/23 1500   Extremity Assessment Distal to IV No swelling;No warmth 06/29/23 0301   Line Status Saline locked;Flushed 06/29/23 1500   Dressing Status Dry;New drainage 06/29/23 1500   Dressing Intervention Integrity maintained 06/29/23 1500   Dressing Change Due 07/02/23 06/29/23 0301   Site Change Due 07/02/23 06/28/23 1901   Reason Not Rotated Not due 06/29/23 1500   Number of days: 1            Peripheral IV - Single Lumen 06/29/23 1112 20 G Anterior;Distal;Left Upper Arm (Active)   Number of days: 0       Imaging      Results for orders placed during the hospital encounter of 06/13/23    Echo    Interpretation Summary  · Overall the study quality was poor.  · The left ventricle is normal in size with concentric remodeling and normal systolic function.  · The estimated ejection fraction is 55 to 60%.  · LV walls not well visualized due to poor sonic windows, but no gross wall motion abnormalities noted.  · Normal left ventricular diastolic function.  · Normal right ventricular size with normal right ventricular systolic function.  · Mild aortic regurgitation.  · Moderate mitral regurgitation.  · Moderate tricuspid regurgitation.  · Mild pulmonic regurgitation.  · Normal central venous pressure (3 mmHg).  · The estimated PA systolic pressure is 30 mmHg.  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      IR Angiogram Visceral  Narrative: EXAMINATION:  Mesenteric angiography    Procedural Personnel    Attending physician(s): Shyam Roa MD    Fellow physician(s): Melissa Chapman MD    Resident physician(s): None    Advanced practice provider(s): None    Pre-procedure diagnosis: Lower GI  hemorrhage    Post-procedure diagnosis: Same    Indication: Gastrointestinal bleeding    Additional clinical history:    Complications: No immediate complications.    CLINICAL HISTORY:  89-year-old male with history of cardiovascular disease on aspirin presents with spontaneous lower GI hemorrhage.  IR evaluation requested.    TECHNIQUE:  - Arterial puncture with ultrasound guidance    - Selective mesenteric angiography: Superior mesenteric and inferior mesenteric angiography    - Superselective mesenteric angiography: Performed as described below    - Additional procedure(s): None    FINDINGS:  Pre-procedure    Consent: Informed consent for the procedure was obtained and time-out was performed prior to the procedure.    Preparation: The site was prepared and draped using maximal sterile barrier technique including cutaneous antisepsis.    Anesthesia/sedation    Level of anesthesia/sedation: Moderate sedation (conscious sedation)    Anesthesia/sedation administered by: Independent trained observer under attending supervision with continuous monitoring of the patient's level of consciousness and physiologic status    Total intra-service sedation time (minutes): 26    Access    Local anesthesia was administered. The vessel was sonographically evaluated and judged to be patent. Real time ultrasound was used to visualize needle entry into the vessel and a permanent image was stored. A 5 Ivorian sheath was placed.    Vessel accessed: Right common femoral artery    Access technique: Micropuncture set with 21 gauge needle    Mesenteric angiography    The mesenteric arterial system was catheterized using 5 Ivorian reverse curve catheter and 2.8 Ivorian microcatheter.    Variant anatomy: None    Vessel catheterized: Superior mesenteric artery    Digital subtraction angiography demonstrates patency without evidence of stenosis, occlusion, or arterial injury.    Vessel catheterized: Inferior mesenteric artery    Digital  subtraction angiography demonstrates patency without evidence of stenosis, occlusion, or arterial injury.    Vessel catheterized: Left colic artery    Digital subtraction angiography demonstrates patency without evidence of occlusion or arterial injury.  Vasospasm of ascending left colic artery branches noted.  There is no evidence of active extravasation.    Vessel catheterized: Sigmoid artery    Digital subtraction angiography demonstrates patency without evidence of occlusion or arterial injury.  There is no evidence of active extravasation.    Closure    Access site angiography performed: Yes    Patent vessel with appropriate access level    Arterial closure technique: Angioseal    Hemostasis achieved from closure technique: Yes    Duration of manual compression (minutes): 2    Contrast    Contrast agent: Omnipaque 300    Contrast volume (mL): 60    Radiation Dose    Fluoroscopy time ( minutes): 11.3    Reference air kerma ( mGy): 150    Kerma area product ( uGy-m2): 4208    Additional Details    Additional description of procedure: None    Equipment details: None    Specimens removed: None    Estimated blood loss (mL): Less than 10    Standardized report: [email or web address]    Attestation    Signer name: Shyam Roa MD    I attest that I was present for the entire procedure. I reviewed the stored images and agree with the report as written.  Impression: Mesenteric angiography performed via superior mesenteric and inferior mesenteric artery branches, as above.  No evidence of active extravasation.  No embolization was performed.    Plan:    Care per ICU team.  Recommend GI consultation and colonoscopy if patient rebleeds.    _______________________________________________________________    Electronically signed by resident: Melissa Chapman  Date:    06/26/2023  Time:    12:27    Electronically signed by: Shyam Roa  Date:    06/26/2023  Time:    13:27  CTA Acute GI Clark, Abdomen and Pelvis  Narrative:  EXAMINATION:  CT a of the abdomen and pelvis with coronal and sagittal MIP images    CLINICAL HISTORY:  GI bleed    TECHNIQUE:  Patient was injected with 80 cc of Omnipaque 350    COMPARISON:  None    FINDINGS:  There are increased markings in the lung bases felt to be chronic.  Liver appears normal.  Spleen appears normal.  Pancreas appears normal.  There is a small cholelithiasis without evidence of acute cholecystitis.  The adrenals are not enlarged.  Kidneys appear normal.  There is a simple cyst of the left kidney.  Aorta shows no evidence of an aneurysm.  The appendix appears normal.  There are changes consistent with active GI bleeding in the distal descending and proximal sigmoid colon.  Impression: Changes consistent with an active GI bleed in the distal descending and proximal sigmoid colon..    [employer] concordance    Electronically signed by: Eladio Cruz MD  Date:    06/26/2023  Time:    08:16      Labs, Imaging, EKG and Diagnostic results from 6/30/2023 were reviewed.    Medications:  Medication list was reviewed and changes noted under Assessment/Plan.  No current facility-administered medications on file prior to encounter.     Current Outpatient Medications on File Prior to Encounter   Medication Sig Dispense Refill    ferrous sulfate (FEOSOL) 325 mg (65 mg iron) Tab tablet Take 1 tablet (325 mg total) by mouth every Mon, Wed, Fri. 36 tablet 0    gabapentin (NEURONTIN) 300 MG capsule Take 1 capsule (300 mg total) by mouth 2 (two) times daily.      pravastatin (PRAVACHOL) 40 MG tablet TAKE 1 TABLET(40 MG) BY MOUTH EVERY DAY 90 tablet 1    acetaminophen (TYLENOL) 500 MG tablet Take 1,000 mg by mouth every 6 (six) hours as needed (headache).      artificial tears (TEARS LUBRICANT EYE DROP) 0.5 % ophthalmic solution Place 1 drop into both eyes daily as needed (dry eye (s)).      multivit-min/FA/lycopen/lutein (CENTRUM SILVER MEN ORAL) Take 1 tablet by mouth once daily.      nitroGLYCERIN (NITROSTAT)  0.4 MG SL tablet Place 1 tablet (0.4 mg total) under the tongue every 5 (five) minutes as needed for Chest pain. 30 tablet 0     Scheduled Medications:  gabapentin, 300 mg, Oral, BID  potassium, sodium phosphates, 2 packet, Oral, QID (AC & HS)  pravastatin, 40 mg, Oral, Daily      PRN: sodium chloride, acetaminophen, ondansetron, prochlorperazine, sodium chloride 0.9%  Infusions:   Estimated Creatinine Clearance: 61.1 mL/min (based on SCr of 0.9 mg/dL).    Assessment/Plan:      * Lower GI bleed  as below    Hypophosphatemia  replaced       Diverticulosis of colon    6/29   s/p GI eval  - Colonoscopy 6/27 showed old blood in colon but no active bleeding. Suspect diverticular source.   Diverticulosis in the sigmoid colon and in the  descending colon.                          - One 15 mm polyp in the cecum. Resection not                          attempted.                          - Three 3 to 7 mm polyps at the hepatic flexure                          and in the ascending colon. Resection not                          attempted.   No recurrent  bleeding since colonoscopy. advance diet as tolerated If becomes hemodynamically unstable with associated large volume hematochezia, recommend STAT CTA and IR embolization if positive .  Hb stable at 8.1. P replaced       CAD (coronary artery disease)     -- Holding ASA given GIB at this time. Will resume when clinically indicated   -- Continue home statin   -- SBP < 180 goal. Currently normotensive. Will hold home antihypertensives in setting of acute GIB        Acute blood loss anemia    89 year old male with CAD on daily ASA, HLD, GERD and neuropathy who presented to OSH with BRBPR hypotensive with SBP 90s. CTA with active distal descending and proximal sigmoid colon bleed. He was given 1g Rocephin, 1L IVF, and 3u PRBC and transfer [facility] to [facility] for IR intervention. Has thus far received 8 Units PRBC, 2 Units FFP, 1 Unit of Plt     -- IR consulted. No site of active  bleeding seen on angiogram for possible embolization 6/26. Colonoscopy showing multiple polyps without evidence of active bleeding.   -- if further bleeding reach out to GI. If requires further PRBC should be transfused a Unit of platelets and 2 Units of FFP.   -- CBC q8h and trend   -- Maintain current type and screen   -- Transfuse for hgb < 7   -- Maintain large bore IV access   -- MAP > 65 goal.     6/29  Patient's with Normocytic anemia.. Hemoglobin stable. Etiology likely due to acute blood loss .  Current CBC reviewed-  Recent Labs   Lab 06/28/23  1852 06/28/23  2358 06/29/23  0606   HGB 7.2* 6.9* 8.1*         Component Value Date/Time    MCV 88 06/29/2023 0606    RDW 16.9 (H) 06/29/2023 0606    IRON 53 12/07/2022 1436    FERRITIN 23 12/07/2022 1436    NTPIKAXV02 925 12/07/2022 1436    OCCULTBLOOD Positive (A) 06/25/2023 1551     Monitor CBC and transfuse if H/H drops below 7/21.     6/29 Transfer to hospital medicine . H/o GERD who presents with multiple episodes of bleeding per rectum and hypotension s/p 3 units of blood - Likely diverticular bleed as with    known diverticular disease and identified active extrav in sigmoid on CTA but not detectable by time of IR angiography, no embolization performed. s/p GI eval  - Colonoscopy 6/27 showed old blood in colon but no active bleeding. Suspect diverticular source.        Gastroesophageal reflux disease without esophagitis  no active issues on EGD        Mixed hyperlipidemia     Continue home statin therapy         VTE Risk Mitigation (From admission, onward)           Ordered     IP VTE HIGH RISK PATIENT  Once         06/26/23 0032                    Discharge Planning   MALCOLM: 7/1/2023     Code Status: Full Code   Is the patient medically ready for discharge?: No    Reason for patient still in hospital (select all that apply): Treatment  Discharge Plan A: Home                  Jeronimo Del Rosario MD  Department of Hospital Medicine   [facility] - Telemetry  Stepdown

## 2023-06-30 NOTE — PLAN OF CARE
06/30/23 0854   Post-Acute Status   Post-Acute Authorization Home Health   Home Health Status Referrals Sent     Referral for  services sent via [facility].    Leticia Diaz RN  Ext [phone]

## 2023-06-30 NOTE — PROGRESS NOTES
[facility] - Telemetry Stepdown  Gastroenterology  Progress Note    Patient Name: Zara Gonzáles  MRN: [id number]  Admission Date: 6/26/2023  Hospital Length of Stay: 4 days  Code Status: Full Code   Attending Provider: Jeronimo Del Rosario MD  Consulting Provider: Irene Bal NP  Primary Care Physician: Judd Honeycutt MD  Principal Problem: Lower GI bleed    Subjective:     Interval History: Followed up with patient s/p Colonoscopy. No further episodes of hematochezia. Last bowel movement was while he was doing the golytely prep. Sitting up in bed eating breakfast and reports feeling well. Afebrile. Vitals stable.     Review of Systems   Constitutional:  Negative for activity change, appetite change and fever.   HENT:  Negative for sore throat and trouble swallowing.    Eyes:  Negative for redness.   Gastrointestinal:  Negative for abdominal distention, abdominal pain, anal bleeding, blood in stool (none recently.), constipation, diarrhea, nausea, rectal pain and vomiting.   Skin:  Negative for color change, pallor and rash.   Neurological:  Negative for dizziness, syncope and weakness.   Objective:     Vital Signs (Most Recent):  Temp: 99.7 °F (37.6 °C) (06/30/23 0321)  Pulse: 72 (06/30/23 0733)  Resp: 18 (06/30/23 0733)  BP: (!) 126/58 (06/30/23 0733)  SpO2: 96 % (06/30/23 0733) Vital Signs (24h Range):  Temp:  [98.4 °F (36.9 °C)-99.9 °F (37.7 °C)] 99.7 °F (37.6 °C)  Pulse:  [72-97] 72  Resp:  [13-22] 18  SpO2:  [95 %-100 %] 96 %  BP: ()/(53-60) 126/58     Weight: 84 kg (185 lb 3 oz) (06/26/23 0000)  Body mass index is 25.12 kg/m².      Intake/Output Summary (Last 24 hours) at 6/30/2023 1004  Last data filed at 6/29/2023 1200  Gross per 24 hour   Intake 222 ml   Output 200 ml   Net 22 ml       Lines/Drains/Airways       Peripheral Intravenous Line  Duration                  Peripheral IV - Single Lumen 06/28/23 1159 20 G;1 3/4 in Left Forearm 1 day         Peripheral IV - Single Lumen 06/29/23 1112 20  G Anterior;Distal;Left Upper Arm <1 day                     Physical Exam  Vitals and nursing note reviewed.   Constitutional:       Appearance: He is normal weight. He is not ill-appearing.   HENT:      Mouth/Throat:      Mouth: Mucous membranes are moist.      Pharynx: Oropharynx is clear.   Eyes:      General: No scleral icterus.  Abdominal:      General: Abdomen is flat. Bowel sounds are normal. There is no distension.      Palpations: Abdomen is soft. There is no mass.      Tenderness: There is no abdominal tenderness.      Hernia: No hernia is present.   Skin:     General: Skin is warm and dry.      Capillary Refill: Capillary refill takes less than 2 seconds.      Coloration: Skin is not jaundiced or pale.      Findings: No bruising or erythema.   Neurological:      Mental Status: He is alert and oriented to person, place, and time. Mental status is at baseline.        Significant Labs:  CBC:   Recent Labs   Lab 06/29/23  1801 06/29/23  2249 06/30/23  0411   WBC 11.44 11.02 10.18   HGB 7.8* 7.6* 7.6*   HCT 24.1* 23.6* 22.9*   * 134* 130*     CMP:   Recent Labs   Lab 06/30/23  0738   GLU 95   CALCIUM 8.1*   ALBUMIN 2.5*   PROT 4.9*      K 4.1   CO2 26      BUN 11   CREATININE 0.9   ALKPHOS 42*   ALT 14   AST 23   BILITOT 0.7         Significant Imaging:  Imaging results within the past 24 hours have been reviewed.    Assessment/Plan:     GI  * Lower GI bleed  Blood countshave remained stable with no further episodes of hematochezia.     --Cbc as clinically indicated  --Close outpatient followup with PCP to monitor blood counts.  --Strict ER precautions reviewed with patient.         Thank you for your consult. After careful review of labs and patient current status with the GI staff and fellow, it has been decided that I will sign off. Please contact us if you have any additional questions.    Irene Bal NP  Gastroenterology  [facility] - Telemetry Stepdown

## 2023-07-03 ENCOUNTER — PATIENT OUTREACH (OUTPATIENT)
Dept: ADMINISTRATIVE | Facility: CLINIC | Age: 88
End: 2023-07-03
Payer: MEDICARE

## 2023-07-03 NOTE — PROGRESS NOTES
C3 nurse spoke with Zara Gonzáles for a TCC post hospital discharge follow up call. The patient has a scheduled hospitals appointment with Elva Encarnacion NP on 7/14/2023 @ 9:30 AM.

## 2023-07-12 ENCOUNTER — OUTPATIENT CASE MANAGEMENT (OUTPATIENT)
Dept: ADMINISTRATIVE | Facility: OTHER | Age: 88
End: 2023-07-12
Payer: MEDICARE

## 2023-07-12 NOTE — PROGRESS NOTES
07/12/23 Attempt assessment with patient/caregiver. Pt declined OPCM at this time. Has all needs met. RN OPCM case closure

## 2023-07-14 ENCOUNTER — OFFICE VISIT (OUTPATIENT)
Dept: FAMILY MEDICINE | Facility: CLINIC | Age: 88
End: 2023-07-14
Payer: MEDICARE

## 2023-07-14 ENCOUNTER — LAB VISIT (OUTPATIENT)
Dept: LAB | Facility: HOSPITAL | Age: 88
End: 2023-07-14
Attending: NURSE PRACTITIONER
Payer: MEDICARE

## 2023-07-14 VITALS
HEIGHT: 72 IN | TEMPERATURE: 98 F | DIASTOLIC BLOOD PRESSURE: 62 MMHG | WEIGHT: 183.63 LBS | SYSTOLIC BLOOD PRESSURE: 124 MMHG | BODY MASS INDEX: 24.87 KG/M2 | RESPIRATION RATE: 18 BRPM | HEART RATE: 64 BPM | OXYGEN SATURATION: 97 %

## 2023-07-14 DIAGNOSIS — I10 ESSENTIAL HYPERTENSION: ICD-10-CM

## 2023-07-14 DIAGNOSIS — I25.10 CORONARY ARTERY DISEASE, UNSPECIFIED VESSEL OR LESION TYPE, UNSPECIFIED WHETHER ANGINA PRESENT, UNSPECIFIED WHETHER NATIVE OR TRANSPLANTED HEART: ICD-10-CM

## 2023-07-14 DIAGNOSIS — K92.2 LOWER GI BLEED: ICD-10-CM

## 2023-07-14 DIAGNOSIS — K92.2 LOWER GI BLEED: Primary | ICD-10-CM

## 2023-07-14 DIAGNOSIS — E53.8 B12 DEFICIENCY: ICD-10-CM

## 2023-07-14 LAB
ANION GAP SERPL CALC-SCNC: 11 MMOL/L (ref 8–16)
BASOPHILS # BLD AUTO: 0.04 K/UL (ref 0–0.2)
BASOPHILS NFR BLD: 0.6 % (ref 0–1.9)
BUN SERPL-MCNC: 16 MG/DL (ref 8–23)
CALCIUM SERPL-MCNC: 9 MG/DL (ref 8.7–10.5)
CHLORIDE SERPL-SCNC: 109 MMOL/L (ref 95–110)
CO2 SERPL-SCNC: 22 MMOL/L (ref 23–29)
CREAT SERPL-MCNC: 1.1 MG/DL (ref 0.5–1.4)
DIFFERENTIAL METHOD: ABNORMAL
EOSINOPHIL # BLD AUTO: 0.2 K/UL (ref 0–0.5)
EOSINOPHIL NFR BLD: 2.3 % (ref 0–8)
ERYTHROCYTE [DISTWIDTH] IN BLOOD BY AUTOMATED COUNT: 16.4 % (ref 11.5–14.5)
EST. GFR  (NO RACE VARIABLE): >60 ML/MIN/1.73 M^2
GLUCOSE SERPL-MCNC: 78 MG/DL (ref 70–110)
HCT VFR BLD AUTO: 27.2 % (ref 40–54)
HGB BLD-MCNC: 8.7 G/DL (ref 14–18)
IMM GRANULOCYTES # BLD AUTO: 0.03 K/UL (ref 0–0.04)
IMM GRANULOCYTES NFR BLD AUTO: 0.4 % (ref 0–0.5)
LYMPHOCYTES # BLD AUTO: 1.3 K/UL (ref 1–4.8)
LYMPHOCYTES NFR BLD: 19.4 % (ref 18–48)
MCH RBC QN AUTO: 27.8 PG (ref 27–31)
MCHC RBC AUTO-ENTMCNC: 32 G/DL (ref 32–36)
MCV RBC AUTO: 87 FL (ref 82–98)
MONOCYTES # BLD AUTO: 1.5 K/UL (ref 0.3–1)
MONOCYTES NFR BLD: 22 % (ref 4–15)
NEUTROPHILS # BLD AUTO: 3.8 K/UL (ref 1.8–7.7)
NEUTROPHILS NFR BLD: 55.3 % (ref 38–73)
NRBC BLD-RTO: 0 /100 WBC
PLATELET # BLD AUTO: 355 K/UL (ref 150–450)
PMV BLD AUTO: 9.9 FL (ref 9.2–12.9)
POTASSIUM SERPL-SCNC: 4.7 MMOL/L (ref 3.5–5.1)
RBC # BLD AUTO: 3.13 M/UL (ref 4.6–6.2)
SODIUM SERPL-SCNC: 142 MMOL/L (ref 136–145)
WBC # BLD AUTO: 6.82 K/UL (ref 3.9–12.7)

## 2023-07-14 PROCEDURE — 99999 PR PBB SHADOW E&M-EST. PATIENT-LVL III: CPT | Mod: PBBFAC,,, | Performed by: NURSE PRACTITIONER

## 2023-07-14 PROCEDURE — 99214 OFFICE O/P EST MOD 30 MIN: CPT | Mod: S$PBB,,, | Performed by: NURSE PRACTITIONER

## 2023-07-14 PROCEDURE — 85025 COMPLETE CBC W/AUTO DIFF WBC: CPT | Mod: PO | Performed by: NURSE PRACTITIONER

## 2023-07-14 PROCEDURE — 99214 PR OFFICE/OUTPT VISIT, EST, LEVL IV, 30-39 MIN: ICD-10-PCS | Mod: S$PBB,,, | Performed by: NURSE PRACTITIONER

## 2023-07-14 PROCEDURE — 99213 OFFICE O/P EST LOW 20 MIN: CPT | Mod: PBBFAC,PO | Performed by: NURSE PRACTITIONER

## 2023-07-14 PROCEDURE — 36415 COLL VENOUS BLD VENIPUNCTURE: CPT | Mod: PO | Performed by: NURSE PRACTITIONER

## 2023-07-14 PROCEDURE — 80048 BASIC METABOLIC PNL TOTAL CA: CPT | Mod: PO | Performed by: NURSE PRACTITIONER

## 2023-07-14 PROCEDURE — 99999 PR PBB SHADOW E&M-EST. PATIENT-LVL III: ICD-10-PCS | Mod: PBBFAC,,, | Performed by: NURSE PRACTITIONER

## 2023-07-14 NOTE — PROGRESS NOTES
"Subjective:       Patient ID: Zara Gonzáles is a 89 y.o. male.    Chief Complaint: Hospital Follow Up ([facility], Rectal bleeding)    HPI  New patient to me presents for hospital follow up, see discharge summary:     "HPI: Mr. Gonzáles is am 89 year old male with PMH notable for GERN, HLD, CAD, and neuropathy who presented to  [facility] with BRBPR and associated dizziness. Per chart review patient reports multiple blood bowel movements and had multiple while in the OSH ED. He arrived hypotensive with SBP in the 90s. In the ED he was given IVF a 3u PRBC. His most recent hemoglobin prior to transfer was 8.6. CTA at OSH: There is new high attenuation material in the distal descending and proximal sigmoid colon consistent with an active lower GI bleed in the distal descending colon. Patient denies any anticoagulation but does take ASA daily.      Notably, he had a recent Upper GI series: WNL. Colonoscopy with diverticulosis and internal hemorrhoids. Critical care medicine at [facility] consulted with plan for IR intervention upon arrival. At the time of transfer he is hemodynamically stable without need for vasopressor support.     Procedure(s) (LRB):  COLONOSCOPY (N/A)       Hospital Course: Presented from OSH with lower GI bleed for IR embolization. CTA showed signs active extravasation. Angiogram did not show active bleeding. Pt had acute bleed requiring blood products and short duration of vasopressors. Pt had colonoscopy the following day with multiple polyps noted on exam, without removal or evidence of active bleeding. Has received 5 Units PRBC, 2 units FFP and 1 unit Plts at our facility, 3 units PRBC at outside hospital     6/29 Transfer to hospital medicine . H/o GERD who presents with multiple episodes of bleeding per rectum and hypotension s/p 3 units of blood - Likely diverticular bleed as with    known diverticular disease and identified active extrav in sigmoid on CTA but not detectable by " "time of IR angiography, no embolization performed. s/p GI eval  - Colonoscopy 6/27 showed old blood in colon but no active bleeding. Suspect diverticular source.   Diverticulosis in the sigmoid colon and in the  descending colon.                          - One 15 mm polyp in the cecum. Resection not                          attempted.                          - Three 3 to 7 mm polyps at the hepatic flexure                          and in the ascending colon. Resection not                          attempted.   No recurrent  bleeding since colonoscopy. advance diet as tolerated If becomes hemodynamically unstable with associated large volume hematochezia, recommend STAT CTA and IR embolization if positive .  Hb stable at 8.1. P replaced   6/30 Hb stable at 7.6 since last night. OK to discharge from GI perspective off ASA. orthostatics today"      DCd home 6/30/23  He is feeling great. Without complaints    HTN: diltiazem held  Scheduled to see cardiologist 8/1/23    Receiving HH --labs drawn yesterday--did not receive     Vitals:    07/14/23 0920   BP: 124/62   Pulse: 64   Resp: 18   Temp: 97.6 °F (36.4 °C)     Review of Systems   Constitutional:  Negative for fever.   Respiratory:  Negative for shortness of breath.    Cardiovascular:  Negative for chest pain and leg swelling.   Neurological:  Negative for dizziness.     Past Medical History:   Diagnosis Date    GERD (gastroesophageal reflux disease)     Hyperlipidemia     Neuropathy      Objective:      Physical Exam  Constitutional:       General: He is not in acute distress.     Appearance: He is well-developed. He is not ill-appearing, toxic-appearing or diaphoretic.   HENT:      Right Ear: Hearing normal.      Left Ear: Hearing normal.   Cardiovascular:      Rate and Rhythm: Normal rate and regular rhythm. Occasional Extrasystoles are present.     Heart sounds: Normal heart sounds.   Pulmonary:      Effort: No tachypnea or respiratory distress.   Skin:     " Coloration: Skin is not pale.   Neurological:      Mental Status: He is alert and oriented to person, place, and time.   Psychiatric:         Speech: Speech normal.         Behavior: Behavior normal.         Thought Content: Thought content normal.         Judgment: Judgment normal.       Assessment:       1. Lower GI bleed    2. Coronary artery disease, unspecified vessel or lesion type, unspecified whether angina present, unspecified whether native or transplanted heart    3. Essential hypertension    4. B12 deficiency        Plan:       Lower GI bleed  -     CBC Auto Differential; Future; Expected date: 07/14/2023  -     Basic Metabolic Panel; Future; Expected date: 07/14/2023    Coronary artery disease, unspecified vessel or lesion type, unspecified whether angina present, unspecified whether native or transplanted heart  Essential hypertension   Continue to hold ASA and diltiazem. Discuss at FU with cardiology    B12 deficiency   Scheduled for injection 1 week         education provided on supportive care, symptom monitoring and return/ER precautions     Follow up for further evaluation if s/s worsen, fail to improve, or new symptoms arise.    Medication List with Changes/Refills   Current Medications    ARTIFICIAL TEARS (TEARS LUBRICANT EYE DROP) 0.5 % OPHTHALMIC SOLUTION    Place 1 drop into both eyes daily as needed (dry eye (s)).    FERROUS SULFATE (FEOSOL) 325 MG (65 MG IRON) TAB TABLET    Take 1 tablet (325 mg total) by mouth every Mon, Wed, Fri.    GABAPENTIN (NEURONTIN) 300 MG CAPSULE    Take 1 capsule (300 mg total) by mouth 2 (two) times daily.    MULTIVIT-MIN/FA/LYCOPEN/LUTEIN (CENTRUM SILVER MEN ORAL)    Take 1 tablet by mouth once daily.    NITROGLYCERIN (NITROSTAT) 0.4 MG SL TABLET    Place 1 tablet (0.4 mg total) under the tongue every 5 (five) minutes as needed for Chest pain.    PRAVASTATIN (PRAVACHOL) 40 MG TABLET    TAKE 1 TABLET(40 MG) BY MOUTH EVERY DAY

## 2023-07-20 ENCOUNTER — CLINICAL SUPPORT (OUTPATIENT)
Dept: FAMILY MEDICINE | Facility: CLINIC | Age: 88
End: 2023-07-20
Payer: MEDICARE

## 2023-07-20 PROCEDURE — 99211 PR OFFICE/OUTPT VISIT, EST, LEVL I: ICD-10-PCS | Mod: S$PBB,,, | Performed by: INTERNAL MEDICINE

## 2023-07-20 PROCEDURE — 96372 THER/PROPH/DIAG INJ SC/IM: CPT | Mod: PBBFAC,PO

## 2023-07-20 PROCEDURE — 99211 OFF/OP EST MAY X REQ PHY/QHP: CPT | Mod: S$PBB,,, | Performed by: INTERNAL MEDICINE

## 2023-07-20 RX ADMIN — CYANOCOBALAMIN 1000 MCG: 1000 INJECTION, SOLUTION INTRAMUSCULAR at 10:07

## 2023-07-20 NOTE — PROGRESS NOTES
After obtaining consent, and per orders of Dr. Honeycutt, injection of B12 given by Beatriz Frausto. Patient instructed to remain in clinic for 20 minutes afterwards, and to report any adverse reaction to me immediately.

## 2023-07-28 DIAGNOSIS — G62.9 PERIPHERAL POLYNEUROPATHY: ICD-10-CM

## 2023-07-28 RX ORDER — GABAPENTIN 300 MG/1
300 CAPSULE ORAL 2 TIMES DAILY
Qty: 180 CAPSULE | Refills: 0 | Status: SHIPPED | OUTPATIENT
Start: 2023-07-28 | End: 2023-10-17 | Stop reason: SDUPTHER

## 2023-07-28 NOTE — TELEPHONE ENCOUNTER
No care due was identified.  Health [facility] Embedded Care Due Messages. Reference number: [id number].   7/28/2023 8:51:03 AM CDT

## 2023-08-17 ENCOUNTER — CLINICAL SUPPORT (OUTPATIENT)
Dept: FAMILY MEDICINE | Facility: CLINIC | Age: 88
End: 2023-08-17
Payer: MEDICARE

## 2023-08-17 DIAGNOSIS — E53.8 B12 DEFICIENCY: Primary | ICD-10-CM

## 2023-08-17 PROCEDURE — 99211 OFF/OP EST MAY X REQ PHY/QHP: CPT | Mod: S$PBB,,, | Performed by: INTERNAL MEDICINE

## 2023-08-17 PROCEDURE — 99999PBSHW PR PBB SHADOW TECHNICAL ONLY FILED TO HB: ICD-10-PCS | Mod: PBBFAC,,,

## 2023-08-17 PROCEDURE — 99999PBSHW PR PBB SHADOW TECHNICAL ONLY FILED TO HB: Mod: PBBFAC,,,

## 2023-08-17 PROCEDURE — 96372 THER/PROPH/DIAG INJ SC/IM: CPT | Mod: PBBFAC,PO

## 2023-08-17 PROCEDURE — 99211 PR OFFICE/OUTPT VISIT, EST, LEVL I: ICD-10-PCS | Mod: S$PBB,,, | Performed by: INTERNAL MEDICINE

## 2023-08-17 RX ADMIN — CYANOCOBALAMIN 1000 MCG: 1000 INJECTION, SOLUTION INTRAMUSCULAR at 11:08

## 2023-08-17 NOTE — PROGRESS NOTES
After obtaining consent, and per orders of Dr. Honeycutt, injection of Vitamin b-12 given  in left upper gluteus muscle by Maira Lorenzo. Patient instructed to remain in clinic for 20 minutes afterwards, and to report any adverse reaction to me immediately.

## 2023-09-14 ENCOUNTER — CLINICAL SUPPORT (OUTPATIENT)
Dept: FAMILY MEDICINE | Facility: CLINIC | Age: 88
End: 2023-09-14
Payer: MEDICARE

## 2023-09-14 DIAGNOSIS — E53.8 B12 DEFICIENCY: Primary | ICD-10-CM

## 2023-09-14 PROCEDURE — 99999PBSHW PR PBB SHADOW TECHNICAL ONLY FILED TO HB: Mod: PBBFAC,,,

## 2023-09-14 PROCEDURE — 96372 THER/PROPH/DIAG INJ SC/IM: CPT | Mod: PBBFAC,PO

## 2023-09-14 PROCEDURE — 99999PBSHW PR PBB SHADOW TECHNICAL ONLY FILED TO HB: ICD-10-PCS | Mod: PBBFAC,,,

## 2023-09-14 RX ADMIN — CYANOCOBALAMIN 1000 MCG: 1000 INJECTION, SOLUTION INTRAMUSCULAR at 10:09

## 2023-09-25 PROBLEM — K92.2 LOWER GI BLEED: Status: RESOLVED | Noted: 2023-06-25 | Resolved: 2023-09-25

## 2023-09-25 PROBLEM — K92.2 GI BLEED: Status: RESOLVED | Noted: 2023-04-03 | Resolved: 2023-09-25

## 2023-10-04 ENCOUNTER — EXTERNAL HOME HEALTH (OUTPATIENT)
Dept: HOME HEALTH SERVICES | Facility: HOSPITAL | Age: 88
End: 2023-10-04
Payer: MEDICARE

## 2023-10-12 ENCOUNTER — CLINICAL SUPPORT (OUTPATIENT)
Dept: FAMILY MEDICINE | Facility: CLINIC | Age: 88
End: 2023-10-12
Payer: MEDICARE

## 2023-10-12 DIAGNOSIS — E53.8 B12 DEFICIENCY: Primary | ICD-10-CM

## 2023-10-12 PROCEDURE — 96372 THER/PROPH/DIAG INJ SC/IM: CPT | Mod: PBBFAC,PO

## 2023-10-12 PROCEDURE — 99999PBSHW PR PBB SHADOW TECHNICAL ONLY FILED TO HB: Mod: PBBFAC,,,

## 2023-10-12 PROCEDURE — 99999PBSHW PR PBB SHADOW TECHNICAL ONLY FILED TO HB: ICD-10-PCS | Mod: PBBFAC,,,

## 2023-10-12 RX ADMIN — CYANOCOBALAMIN 1000 MCG: 1000 INJECTION, SOLUTION INTRAMUSCULAR at 10:10

## 2023-10-17 ENCOUNTER — OFFICE VISIT (OUTPATIENT)
Dept: FAMILY MEDICINE | Facility: CLINIC | Age: 88
End: 2023-10-17
Payer: MEDICARE

## 2023-10-17 ENCOUNTER — LAB VISIT (OUTPATIENT)
Dept: LAB | Facility: HOSPITAL | Age: 88
End: 2023-10-17
Attending: INTERNAL MEDICINE
Payer: MEDICARE

## 2023-10-17 VITALS
WEIGHT: 183.88 LBS | BODY MASS INDEX: 24.94 KG/M2 | SYSTOLIC BLOOD PRESSURE: 128 MMHG | HEART RATE: 80 BPM | OXYGEN SATURATION: 98 % | DIASTOLIC BLOOD PRESSURE: 78 MMHG

## 2023-10-17 DIAGNOSIS — D62 ACUTE BLOOD LOSS ANEMIA: ICD-10-CM

## 2023-10-17 DIAGNOSIS — G62.9 PERIPHERAL POLYNEUROPATHY: ICD-10-CM

## 2023-10-17 DIAGNOSIS — N48.1 BALANITIS: ICD-10-CM

## 2023-10-17 DIAGNOSIS — Z12.5 SCREENING FOR PROSTATE CANCER: ICD-10-CM

## 2023-10-17 DIAGNOSIS — E78.2 MIXED HYPERLIPIDEMIA: ICD-10-CM

## 2023-10-17 DIAGNOSIS — I25.10 CORONARY ARTERY DISEASE, UNSPECIFIED VESSEL OR LESION TYPE, UNSPECIFIED WHETHER ANGINA PRESENT, UNSPECIFIED WHETHER NATIVE OR TRANSPLANTED HEART: Primary | ICD-10-CM

## 2023-10-17 DIAGNOSIS — E53.8 B12 DEFICIENCY: ICD-10-CM

## 2023-10-17 DIAGNOSIS — J43.9 PULMONARY EMPHYSEMA, UNSPECIFIED EMPHYSEMA TYPE: ICD-10-CM

## 2023-10-17 LAB
COMPLEXED PSA SERPL-MCNC: 3.4 NG/ML (ref 0–4)
ERYTHROCYTE [DISTWIDTH] IN BLOOD BY AUTOMATED COUNT: 19 % (ref 11.5–14.5)
FERRITIN SERPL-MCNC: 32 NG/ML (ref 20–300)
HCT VFR BLD AUTO: 38.6 % (ref 40–54)
HGB BLD-MCNC: 11.9 G/DL (ref 14–18)
MCH RBC QN AUTO: 26.9 PG (ref 27–31)
MCHC RBC AUTO-ENTMCNC: 30.8 G/DL (ref 32–36)
MCV RBC AUTO: 87 FL (ref 82–98)
PLATELET # BLD AUTO: 177 K/UL (ref 150–450)
PMV BLD AUTO: 13.1 FL (ref 9.2–12.9)
RBC # BLD AUTO: 4.43 M/UL (ref 4.6–6.2)
WBC # BLD AUTO: 5.19 K/UL (ref 3.9–12.7)

## 2023-10-17 PROCEDURE — 84153 ASSAY OF PSA TOTAL: CPT | Performed by: INTERNAL MEDICINE

## 2023-10-17 PROCEDURE — 99214 OFFICE O/P EST MOD 30 MIN: CPT | Mod: S$PBB,,, | Performed by: INTERNAL MEDICINE

## 2023-10-17 PROCEDURE — 99999PBSHW FLU VACCINE - QUADRIVALENT - ADJUVANTED: Mod: PBBFAC,,,

## 2023-10-17 PROCEDURE — 99999 PR PBB SHADOW E&M-EST. PATIENT-LVL III: CPT | Mod: PBBFAC,,, | Performed by: INTERNAL MEDICINE

## 2023-10-17 PROCEDURE — 82728 ASSAY OF FERRITIN: CPT | Performed by: INTERNAL MEDICINE

## 2023-10-17 PROCEDURE — 36415 COLL VENOUS BLD VENIPUNCTURE: CPT | Mod: PO | Performed by: INTERNAL MEDICINE

## 2023-10-17 PROCEDURE — 99213 OFFICE O/P EST LOW 20 MIN: CPT | Mod: PBBFAC,PO,25 | Performed by: INTERNAL MEDICINE

## 2023-10-17 PROCEDURE — 99999 PR PBB SHADOW E&M-EST. PATIENT-LVL III: ICD-10-PCS | Mod: PBBFAC,,, | Performed by: INTERNAL MEDICINE

## 2023-10-17 PROCEDURE — 99999PBSHW FLU VACCINE - QUADRIVALENT - ADJUVANTED: ICD-10-PCS | Mod: PBBFAC,,,

## 2023-10-17 PROCEDURE — 85027 COMPLETE CBC AUTOMATED: CPT | Performed by: INTERNAL MEDICINE

## 2023-10-17 PROCEDURE — G0008 ADMIN INFLUENZA VIRUS VAC: HCPCS | Mod: PBBFAC,PO

## 2023-10-17 PROCEDURE — 99214 PR OFFICE/OUTPT VISIT, EST, LEVL IV, 30-39 MIN: ICD-10-PCS | Mod: S$PBB,,, | Performed by: INTERNAL MEDICINE

## 2023-10-17 RX ORDER — GABAPENTIN 300 MG/1
300 CAPSULE ORAL 2 TIMES DAILY
Qty: 180 CAPSULE | Refills: 3 | Status: SHIPPED | OUTPATIENT
Start: 2023-10-17

## 2023-10-17 RX ORDER — PRAVASTATIN SODIUM 40 MG/1
40 TABLET ORAL DAILY
Qty: 90 TABLET | Refills: 3 | Status: SHIPPED | OUTPATIENT
Start: 2023-10-17

## 2023-10-17 RX ORDER — CLOTRIMAZOLE 1 %
CREAM (GRAM) TOPICAL 2 TIMES DAILY
Qty: 45 G | Refills: 0 | Status: SHIPPED | OUTPATIENT
Start: 2023-10-17

## 2023-10-17 NOTE — PROGRESS NOTES
Subjective     Zara Gonzáles is a 89 y.o. old, male here for Follow-up    89 y/o with PMH of mild CAD, HLD, peripheral neuropathy, B12 def  Mild CAD: asymptomatic, walking around his house and outside some, goes up stairs with handrail okay.  HLD: on statin therapy, doing well  Neuropathy stable, bothersome but gabapentin helps.  B12 def: remains on IM injections  Mild emphysema on prior imaging: asymptomatic.  New problem the past several months with red rash and skin irritation of glans penis.    ROS  Medications     Outpatient Medications Marked as Taking for the 10/17/23 encounter (Office Visit) with Judd Honeycutt MD   Medication Sig Dispense Refill    artificial tears (TEARS LUBRICANT EYE DROP) 0.5 % ophthalmic solution Place 1 drop into both eyes daily as needed (dry eye (s)).      ferrous sulfate (FEOSOL) 325 mg (65 mg iron) Tab tablet Take 1 tablet (325 mg total) by mouth every Mon, Wed, Fri. 36 tablet 0    multivit-min/FA/lycopen/lutein (CENTRUM SILVER MEN ORAL) Take 1 tablet by mouth once daily.      nitroGLYCERIN (NITROSTAT) 0.4 MG SL tablet Place 1 tablet (0.4 mg total) under the tongue every 5 (five) minutes as needed for Chest pain. 30 tablet 0    [DISCONTINUED] gabapentin (NEURONTIN) 300 MG capsule Take 1 capsule (300 mg total) by mouth 2 (two) times daily. 180 capsule 0    [DISCONTINUED] pravastatin (PRAVACHOL) 40 MG tablet TAKE 1 TABLET(40 MG) BY MOUTH EVERY DAY 90 tablet 1     Current Facility-Administered Medications for the 10/17/23 encounter (Office Visit) with Judd Honeycutt MD   Medication Dose Route Frequency Provider Last Rate Last Admin    cyanocobalamin injection 1,000 mcg  1,000 mcg Intramuscular Q30 Days Abdirahman Arrington MD   1,000 mcg at 10/12/23 1042     Objective     /78   Pulse 80   Wt 83.4 kg (183 lb 13.8 oz)   SpO2 98%   BMI 24.94 kg/m²   Physical Exam  Constitutional:       General: He is not in acute distress.     Appearance: Normal appearance. He is  well-developed.   Cardiovascular:      Rate and Rhythm: Normal rate and regular rhythm.      Heart sounds: Murmur heard.   Pulmonary:      Effort: Pulmonary effort is normal. No respiratory distress.      Breath sounds: Normal breath sounds.   Skin:     Findings: Erythema and rash present.       Assessment and Plan     Coronary artery disease, unspecified vessel or lesion type, unspecified whether angina present, unspecified whether native or transplanted heart    B12 deficiency    Acute blood loss anemia  -     CBC Without Differential; Future; Expected date: 10/17/2023  -     Ferritin; Future; Expected date: 10/17/2023    Mixed hyperlipidemia  -     pravastatin (PRAVACHOL) 40 MG tablet; Take 1 tablet (40 mg total) by mouth once daily.  Dispense: 90 tablet; Refill: 3    Pulmonary emphysema, unspecified emphysema type    Screening for prostate cancer  -     PSA, Screening; Future; Expected date: 10/17/2023    Peripheral polyneuropathy  -     gabapentin (NEURONTIN) 300 MG capsule; Take 1 capsule (300 mg total) by mouth 2 (two) times daily.  Dispense: 180 capsule; Refill: 3    Balanitis  -     clotrimazole (LOTRIMIN) 1 % cream; Apply topically 2 (two) times daily.  Dispense: 45 g; Refill: 0        ___________________  Judd Honeycutt MD  Internal Medicine and Pediatrics

## 2023-11-04 ENCOUNTER — TELEPHONE (OUTPATIENT)
Dept: FAMILY MEDICINE | Facility: CLINIC | Age: 88
End: 2023-11-04
Payer: MEDICARE

## 2023-11-04 NOTE — TELEPHONE ENCOUNTER
----- Message from Riri Funez sent at 11/3/2023  4:39 PM CDT -----  Contact: self  Type:  Needs Medical Advice    Who Called: Pt  Symptoms (please be specific): Pt has a question about his iron tablets  Best Call Back Number: [phone]  Pt would also like a copy of his blood work done on 10/17...   Please call to advise... Thank you...

## 2023-11-14 ENCOUNTER — CLINICAL SUPPORT (OUTPATIENT)
Dept: FAMILY MEDICINE | Facility: CLINIC | Age: 88
End: 2023-11-14
Payer: MEDICARE

## 2023-11-14 DIAGNOSIS — E53.8 B12 DEFICIENCY: Primary | ICD-10-CM

## 2023-11-14 PROCEDURE — 99211 OFF/OP EST MAY X REQ PHY/QHP: CPT | Mod: S$PBB,,, | Performed by: INTERNAL MEDICINE

## 2023-11-14 PROCEDURE — 99211 OFF/OP EST MAY X REQ PHY/QHP: CPT | Mod: PBBFAC,PO

## 2023-11-14 PROCEDURE — 99999PBSHW PR PBB SHADOW TECHNICAL ONLY FILED TO HB: Mod: PBBFAC,,,

## 2023-11-14 PROCEDURE — 99999 PR PBB SHADOW E&M-EST. PATIENT-LVL I: ICD-10-PCS | Mod: PBBFAC,,,

## 2023-11-14 PROCEDURE — 99999PBSHW PR PBB SHADOW TECHNICAL ONLY FILED TO HB: ICD-10-PCS | Mod: PBBFAC,,,

## 2023-11-14 PROCEDURE — 99999 PR PBB SHADOW E&M-EST. PATIENT-LVL I: CPT | Mod: PBBFAC,,,

## 2023-11-14 PROCEDURE — 99211 PR OFFICE/OUTPT VISIT, EST, LEVL I: ICD-10-PCS | Mod: S$PBB,,, | Performed by: INTERNAL MEDICINE

## 2023-11-14 PROCEDURE — 96372 THER/PROPH/DIAG INJ SC/IM: CPT | Mod: PBBFAC,PO

## 2023-11-14 RX ADMIN — CYANOCOBALAMIN 1000 MCG: 1000 INJECTION, SOLUTION INTRAMUSCULAR at 10:11

## 2023-11-14 NOTE — PROGRESS NOTES
After obtaining consent, and per orders of Dr. Honeycutt, injection of cyanocobalamin 1000 mcgs given in right upper outer gluteus muscle by Maira Lorenzo. Patient instructed to remain in clinic for 20 minutes afterwards, and to report any adverse reaction to me immediately.

## 2023-12-12 ENCOUNTER — TELEPHONE (OUTPATIENT)
Dept: FAMILY MEDICINE | Facility: CLINIC | Age: 88
End: 2023-12-12

## 2023-12-12 ENCOUNTER — CLINICAL SUPPORT (OUTPATIENT)
Dept: FAMILY MEDICINE | Facility: CLINIC | Age: 88
End: 2023-12-12
Payer: MEDICARE

## 2023-12-12 DIAGNOSIS — E53.8 B12 DEFICIENCY: Primary | ICD-10-CM

## 2023-12-12 PROCEDURE — 99999PBSHW PR PBB SHADOW TECHNICAL ONLY FILED TO HB: Mod: PBBFAC,,,

## 2023-12-12 PROCEDURE — 96372 THER/PROPH/DIAG INJ SC/IM: CPT | Mod: PBBFAC,PO

## 2023-12-12 PROCEDURE — 99999PBSHW PR PBB SHADOW TECHNICAL ONLY FILED TO HB: ICD-10-PCS | Mod: PBBFAC,,,

## 2023-12-12 RX ORDER — CYANOCOBALAMIN 1000 UG/ML
1000 INJECTION, SOLUTION INTRAMUSCULAR; SUBCUTANEOUS
Status: SHIPPED | OUTPATIENT
Start: 2023-12-12 | End: 2024-12-06

## 2023-12-12 RX ADMIN — CYANOCOBALAMIN 1000 MCG: 1000 INJECTION, SOLUTION INTRAMUSCULAR at 11:12

## 2024-01-12 ENCOUNTER — CLINICAL SUPPORT (OUTPATIENT)
Dept: FAMILY MEDICINE | Facility: CLINIC | Age: 89
End: 2024-01-12
Payer: MEDICARE

## 2024-01-12 DIAGNOSIS — E53.8 B12 DEFICIENCY: Primary | ICD-10-CM

## 2024-01-12 PROCEDURE — 96372 THER/PROPH/DIAG INJ SC/IM: CPT | Mod: PBBFAC,PO

## 2024-01-12 PROCEDURE — 99999PBSHW PR PBB SHADOW TECHNICAL ONLY FILED TO HB: Mod: PBBFAC,,,

## 2024-01-12 PROCEDURE — 99211 OFF/OP EST MAY X REQ PHY/QHP: CPT | Mod: S$PBB,,, | Performed by: INTERNAL MEDICINE

## 2024-01-12 RX ADMIN — CYANOCOBALAMIN 1000 MCG: 1000 INJECTION, SOLUTION INTRAMUSCULAR at 10:01

## 2024-01-26 ENCOUNTER — OFFICE VISIT (OUTPATIENT)
Dept: HOME HEALTH SERVICES | Facility: CLINIC | Age: 89
End: 2024-01-26
Payer: MEDICARE

## 2024-01-26 VITALS
HEART RATE: 61 BPM | HEIGHT: 72 IN | WEIGHT: 178 LBS | SYSTOLIC BLOOD PRESSURE: 136 MMHG | BODY MASS INDEX: 24.11 KG/M2 | OXYGEN SATURATION: 97 % | DIASTOLIC BLOOD PRESSURE: 73 MMHG

## 2024-01-26 DIAGNOSIS — Z00.00 ENCOUNTER FOR PREVENTIVE HEALTH EXAMINATION: Primary | ICD-10-CM

## 2024-01-26 DIAGNOSIS — D64.9 NORMOCYTIC ANEMIA: ICD-10-CM

## 2024-01-26 DIAGNOSIS — E53.8 B12 DEFICIENCY: ICD-10-CM

## 2024-01-26 DIAGNOSIS — J43.9 PULMONARY EMPHYSEMA, UNSPECIFIED EMPHYSEMA TYPE: ICD-10-CM

## 2024-01-26 DIAGNOSIS — Z87.19 HISTORY OF GI DIVERTICULAR BLEED: ICD-10-CM

## 2024-01-26 DIAGNOSIS — I77.819 ACQUIRED DILATION OF ASCENDING AORTA AND AORTIC ROOT: ICD-10-CM

## 2024-01-26 DIAGNOSIS — K21.9 GASTROESOPHAGEAL REFLUX DISEASE WITHOUT ESOPHAGITIS: ICD-10-CM

## 2024-01-26 DIAGNOSIS — E78.2 MIXED HYPERLIPIDEMIA: ICD-10-CM

## 2024-01-26 DIAGNOSIS — Z79.82 ASPIRIN LONG-TERM USE: ICD-10-CM

## 2024-01-26 DIAGNOSIS — K57.30 DIVERTICULOSIS OF COLON: ICD-10-CM

## 2024-01-26 DIAGNOSIS — I25.10 CORONARY ARTERY DISEASE, UNSPECIFIED VESSEL OR LESION TYPE, UNSPECIFIED WHETHER ANGINA PRESENT, UNSPECIFIED WHETHER NATIVE OR TRANSPLANTED HEART: ICD-10-CM

## 2024-01-26 PROCEDURE — G0439 PPPS, SUBSEQ VISIT: HCPCS | Mod: S$GLB,,, | Performed by: NURSE PRACTITIONER

## 2024-01-26 RX ORDER — ASPIRIN 81 MG/1
81 TABLET ORAL DAILY
COMMUNITY

## 2024-01-28 PROBLEM — Z79.82 ASPIRIN LONG-TERM USE: Status: ACTIVE | Noted: 2024-01-28

## 2024-01-28 PROBLEM — R07.9 CHEST PAIN: Status: RESOLVED | Noted: 2022-11-25 | Resolved: 2024-01-28

## 2024-01-28 PROBLEM — I49.1 PAC (PREMATURE ATRIAL CONTRACTION): Status: RESOLVED | Noted: 2022-11-28 | Resolved: 2024-01-28

## 2024-01-28 PROBLEM — Z87.19 HISTORY OF GI DIVERTICULAR BLEED: Status: ACTIVE | Noted: 2024-01-28

## 2024-01-28 PROBLEM — R93.3 ABNORMAL CT SCAN, COLON: Status: RESOLVED | Noted: 2023-06-26 | Resolved: 2024-01-28

## 2024-01-28 PROBLEM — K92.1 HEMATOCHEZIA: Status: RESOLVED | Noted: 2023-04-03 | Resolved: 2024-01-28

## 2024-01-28 PROBLEM — I77.810 ACQUIRED DILATION OF ASCENDING AORTA AND AORTIC ROOT: Status: ACTIVE | Noted: 2024-01-28

## 2024-01-28 PROBLEM — I77.819 ACQUIRED DILATION OF ASCENDING AORTA AND AORTIC ROOT: Status: ACTIVE | Noted: 2024-01-28

## 2024-01-29 NOTE — PATIENT INSTRUCTIONS
Counseling and Referral of Other Preventative  (Italic type indicates deductible and co-insurance are waived)    Patient Name: Zara Gonzáles  Today's Date: 1/28/2024    Health Maintenance       Date Due Completion Date    RSV Vaccine (Age 60+ and Pregnant patients) (1 - 1-dose 60+ series) Never done ---    Shingles Vaccine (2 of 3) 08/30/2017 7/5/2017    Aspirin/Antiplatelet Therapy 01/26/2025 1/26/2024    Override on 1/26/2024: Done    Lipid Panel 11/26/2027 11/26/2022    TETANUS VACCINE 04/09/2028 4/9/2018 (Declined)    Override on 4/9/2018: Declined        No orders of the defined types were placed in this encounter.      The following information is provided to all patients.  This information is to help you find resources for any of the problems found today that may be affecting your health:                  Living healthy guide: www.[facility].[place].gov      Understanding Diabetes: www.diabetes.org      Eating healthy: www.cdc.gov/healthyweight      [facility] home safety checklist: www.cdc.gov/steadi/patient.html      Agency on Aging: www.goea.[place].[facility]      Alcoholics anonymous (AA): www.aa.org      Physical Activity: www.cain.nih.gov/dg2sxhc      Tobacco use: www.quitwithusla.org

## 2024-01-29 NOTE — PROGRESS NOTES
Zara Gonzáles presented for an initial Medicare AWV today. The following components were reviewed and updated:    Medical history  Family History  Social history  Allergies and Current Medications  Health Risk Assessment  Health Maintenance  Care Team    **See Completed Assessments for Annual Wellness visit with in the encounter summary    The following assessments were completed:  Depression Screening  Cognitive function Screening  Timed Get Up Test  Whisper Test      Opioid documentation:      Patient does not have a current opioid prescription.          Vitals:    01/26/24 0934   BP: 136/73   Pulse: 61   SpO2: 97%   Weight: 80.7 kg (178 lb)   Height: 6' (1.829 m)     Body mass index is 24.14 kg/m².       Physical Exam  Constitutional:       Appearance: Normal appearance.   HENT:      Head: Normocephalic and atraumatic.      Nose: Nose normal.      Mouth/Throat:      Mouth: Mucous membranes are moist.   Eyes:      Extraocular Movements: Extraocular movements intact.      Pupils: Pupils are equal, round, and reactive to light.   Cardiovascular:      Rate and Rhythm: Normal rate and regular rhythm.   Pulmonary:      Effort: Pulmonary effort is normal.      Breath sounds: Normal breath sounds.   Abdominal:      General: Bowel sounds are normal.      Palpations: Abdomen is soft.   Musculoskeletal:         General: Normal range of motion.      Cervical back: Normal range of motion and neck supple.   Skin:     General: Skin is warm and dry.   Neurological:      General: No focal deficit present.      Mental Status: He is alert and oriented to person, place, and time.   Psychiatric:         Mood and Affect: Mood normal.         Behavior: Behavior normal.           Diagnoses and health risks identified today and associated recommendations/orders:  1. Encounter for preventive health examination  Awv completed    2. History of GI diverticular bleed  Chronic and stable. Continue current treatment. Follow with PCP.  Stable  HH improved      3. 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 Continue current treatment. Follow with PCP.  Follows with cardiology      4. Pulmonary emphysema, unspecified emphysema type  Chronic and stable. Continue current treatment. Follow with PCP.  Seen on imaging, Nonsymptomatic    5. Coronary artery disease, unspecified vessel or lesion type, unspecified whether angina present, unspecified whether native or transplanted heart  Chronic and stable. Continue current treatment. Follow with PCP.  Follows with cardiology      6. Diverticulosis of colon  Chronic and stable. Continue current treatment. Follow with PCP.      7. BMI 24.0-24.9, adult  Chronic and stable. Continue current treatment. Follow with PCP.      8. Aspirin long-term use  Chronic and stable. Continue current treatment. Follow with PCP.  Monitor risks of bleeding      9. Normocytic anemia  Chronic and stable. Continue current treatment. Follow with PCP.  Following labs      10. B12 deficiency  Chronic and stable. Continue current treatment. Follow with PCP.  Supplements B12 monthly      11. Gastroesophageal reflux disease without esophagitis  Chronic and stable. Continue current treatment. Follow with PCP.  No meds currently, does manage his diet well      12. Mixed hyperlipidemia  Chronic and stable. Continue current treatment. Follow with PCP.  On statin         Provided Zara with a 5-10 year written screening schedule and personal prevention plan. Recommendations were developed using the USPSTF age appropriate recommendations. Education, counseling, and referrals were provided as needed.  After Visit Summary printed and given to patient which includes a list of additional screenings\tests needed.    Fu in 1yr for ananth Valentine NP

## 2024-02-02 ENCOUNTER — PATIENT MESSAGE (OUTPATIENT)
Dept: FAMILY MEDICINE | Facility: CLINIC | Age: 89
End: 2024-02-02
Payer: MEDICARE

## 2024-02-09 ENCOUNTER — CLINICAL SUPPORT (OUTPATIENT)
Dept: FAMILY MEDICINE | Facility: CLINIC | Age: 89
End: 2024-02-09
Payer: MEDICARE

## 2024-02-09 DIAGNOSIS — E53.8 B12 DEFICIENCY: Primary | ICD-10-CM

## 2024-02-09 PROCEDURE — 99211 OFF/OP EST MAY X REQ PHY/QHP: CPT | Mod: PBBFAC,PO,25

## 2024-02-09 PROCEDURE — 99999 PR PBB SHADOW E&M-EST. PATIENT-LVL I: CPT | Mod: PBBFAC,,,

## 2024-02-09 PROCEDURE — 99999PBSHW PR PBB SHADOW TECHNICAL ONLY FILED TO HB: Mod: PBBFAC,,,

## 2024-02-09 PROCEDURE — 99211 OFF/OP EST MAY X REQ PHY/QHP: CPT | Mod: S$PBB,,, | Performed by: INTERNAL MEDICINE

## 2024-02-09 PROCEDURE — 96372 THER/PROPH/DIAG INJ SC/IM: CPT | Mod: PBBFAC,PO

## 2024-02-09 RX ADMIN — CYANOCOBALAMIN 1000 MCG: 1000 INJECTION, SOLUTION INTRAMUSCULAR at 10:02

## 2024-02-09 NOTE — PROGRESS NOTES
After obtaining consent, and per orders of Dr. Arrington, injection of cyanocobalamin 1000 mcg given by Seven Philippe. Patient instructed to remain in clinic for 20 minutes afterwards, and to report any adverse reaction to me immediately.

## 2024-03-08 ENCOUNTER — CLINICAL SUPPORT (OUTPATIENT)
Dept: FAMILY MEDICINE | Facility: CLINIC | Age: 89
End: 2024-03-08
Payer: MEDICARE

## 2024-03-08 DIAGNOSIS — E53.8 B12 DEFICIENCY: Primary | ICD-10-CM

## 2024-03-08 PROCEDURE — 99999PBSHW PR PBB SHADOW TECHNICAL ONLY FILED TO HB: Mod: PBBFAC,,,

## 2024-03-08 PROCEDURE — 99211 OFF/OP EST MAY X REQ PHY/QHP: CPT | Mod: S$PBB,,, | Performed by: INTERNAL MEDICINE

## 2024-03-08 PROCEDURE — 96372 THER/PROPH/DIAG INJ SC/IM: CPT | Mod: PBBFAC,PO

## 2024-03-08 RX ADMIN — CYANOCOBALAMIN 1000 MCG: 1000 INJECTION, SOLUTION INTRAMUSCULAR at 10:03

## 2024-04-09 ENCOUNTER — CLINICAL SUPPORT (OUTPATIENT)
Dept: FAMILY MEDICINE | Facility: CLINIC | Age: 89
End: 2024-04-09
Payer: MEDICARE

## 2024-04-09 DIAGNOSIS — E53.8 B12 DEFICIENCY: Primary | ICD-10-CM

## 2024-04-09 PROCEDURE — 99211 OFF/OP EST MAY X REQ PHY/QHP: CPT | Mod: S$PBB,,, | Performed by: INTERNAL MEDICINE

## 2024-04-09 PROCEDURE — 99999PBSHW PR PBB SHADOW TECHNICAL ONLY FILED TO HB: Mod: PBBFAC,,,

## 2024-04-09 PROCEDURE — 96372 THER/PROPH/DIAG INJ SC/IM: CPT | Mod: PBBFAC,PO

## 2024-04-09 RX ADMIN — CYANOCOBALAMIN 1000 MCG: 1000 INJECTION, SOLUTION INTRAMUSCULAR at 09:04

## 2024-04-17 ENCOUNTER — OFFICE VISIT (OUTPATIENT)
Dept: FAMILY MEDICINE | Facility: CLINIC | Age: 89
End: 2024-04-17
Payer: MEDICARE

## 2024-04-17 VITALS
SYSTOLIC BLOOD PRESSURE: 126 MMHG | OXYGEN SATURATION: 96 % | WEIGHT: 186.75 LBS | BODY MASS INDEX: 25.33 KG/M2 | DIASTOLIC BLOOD PRESSURE: 74 MMHG | HEART RATE: 75 BPM

## 2024-04-17 DIAGNOSIS — E53.8 B12 DEFICIENCY: ICD-10-CM

## 2024-04-17 DIAGNOSIS — J43.9 PULMONARY EMPHYSEMA, UNSPECIFIED EMPHYSEMA TYPE: Primary | ICD-10-CM

## 2024-04-17 DIAGNOSIS — E78.2 MIXED HYPERLIPIDEMIA: ICD-10-CM

## 2024-04-17 PROBLEM — Z71.89 ACP (ADVANCE CARE PLANNING): Status: RESOLVED | Noted: 2022-11-25 | Resolved: 2024-04-17

## 2024-04-17 PROCEDURE — 99214 OFFICE O/P EST MOD 30 MIN: CPT | Mod: S$PBB,,, | Performed by: INTERNAL MEDICINE

## 2024-04-17 PROCEDURE — 99999 PR PBB SHADOW E&M-EST. PATIENT-LVL III: CPT | Mod: PBBFAC,,, | Performed by: INTERNAL MEDICINE

## 2024-04-17 PROCEDURE — 99213 OFFICE O/P EST LOW 20 MIN: CPT | Mod: PBBFAC,PO | Performed by: INTERNAL MEDICINE

## 2024-04-17 NOTE — PROGRESS NOTES
Subjective     Zara Gonzáles is a 89 y.o. old, male here for Follow-up    89 y/o with PMH of mild CAD, HLD, peripheral neuropathy, B12 def   Mild CAD: asymptomatic, walking outside some  HLD: on statin therapy, doing well  Neuropathy stable, bothersome but gabapentin helps.  B12 def: remains on IM injections monthly  Mild emphysema on prior imaging: asymptomatic.      Review of Systems   Constitutional:  Negative for malaise/fatigue and weight loss.   Respiratory:  Negative for cough and shortness of breath.    Cardiovascular:  Negative for chest pain and palpitations.     Medications     Current Outpatient Medications   Medication Sig Dispense Refill    artificial tears (TEARS LUBRICANT EYE DROP) 0.5 % ophthalmic solution Place 1 drop into both eyes daily as needed (dry eye (s)).      aspirin (ECOTRIN) 81 MG EC tablet Take 81 mg by mouth once daily.      clotrimazole (LOTRIMIN) 1 % cream Apply topically 2 (two) times daily. 45 g 0    gabapentin (NEURONTIN) 300 MG capsule Take 1 capsule (300 mg total) by mouth 2 (two) times daily. 180 capsule 3    multivit-min/FA/lycopen/lutein (CENTRUM SILVER MEN ORAL) Take 1 tablet by mouth once daily.      nitroGLYCERIN (NITROSTAT) 0.4 MG SL tablet Place 1 tablet (0.4 mg total) under the tongue every 5 (five) minutes as needed for Chest pain. 30 tablet 0    pravastatin (PRAVACHOL) 40 MG tablet Take 1 tablet (40 mg total) by mouth once daily. 90 tablet 3     Current Facility-Administered Medications   Medication Dose Route Frequency Provider Last Rate Last Admin    cyanocobalamin injection 1,000 mcg  1,000 mcg Intramuscular Q30 Days Abdirahman Arrington MD   1,000 mcg at 02/09/24 1038    cyanocobalamin injection 1,000 mcg  1,000 mcg Intramuscular Q30 Days Judd Honeycutt MD   1,000 mcg at 04/09/24 0957     Objective     /74   Pulse 75   Wt 84.7 kg (186 lb 11.7 oz)   SpO2 96%   BMI 25.33 kg/m²   Physical Exam  Constitutional:       General: He is not in acute  distress.     Appearance: Normal appearance. He is well-developed.   Cardiovascular:      Rate and Rhythm: Normal rate and regular rhythm.      Heart sounds: Murmur heard.   Pulmonary:      Effort: Pulmonary effort is normal. No respiratory distress.      Breath sounds: Normal breath sounds.       Assessment and Plan     Pulmonary emphysema, unspecified emphysema type    Mixed hyperlipidemia    B12 deficiency    Stable, f/u 6 months    ___________________  Judd Honeycutt MD  Internal Medicine and Pediatrics

## 2024-05-10 ENCOUNTER — CLINICAL SUPPORT (OUTPATIENT)
Dept: FAMILY MEDICINE | Facility: CLINIC | Age: 89
End: 2024-05-10
Payer: MEDICARE

## 2024-05-10 ENCOUNTER — TELEPHONE (OUTPATIENT)
Dept: FAMILY MEDICINE | Facility: CLINIC | Age: 89
End: 2024-05-10

## 2024-05-10 DIAGNOSIS — E53.8 B12 DEFICIENCY: Primary | ICD-10-CM

## 2024-05-10 PROCEDURE — 99999PBSHW PR PBB SHADOW TECHNICAL ONLY FILED TO HB: Mod: PBBFAC,,,

## 2024-05-10 PROCEDURE — 96372 THER/PROPH/DIAG INJ SC/IM: CPT | Mod: PBBFAC,PO

## 2024-05-10 RX ADMIN — CYANOCOBALAMIN 1000 MCG: 1000 INJECTION, SOLUTION INTRAMUSCULAR at 10:05

## 2024-05-10 NOTE — TELEPHONE ENCOUNTER
----- Message from Thelma Avila sent at 5/10/2024 12:22 PM CDT -----  Regarding: advice  Type:  Needs Medical Advice    Who Called: pt    Best Call Back Number: [phone]      Additional Information: pt needs to reschedule his nurse visit for his b12. please call to discuss.

## 2024-05-10 NOTE — TELEPHONE ENCOUNTER
----- Message from Thelma Avila sent at 5/10/2024 12:22 PM CDT -----  Regarding: advice  Type:  Needs Medical Advice    Who Called: pt    Best Call Back Number: [phone]      Additional Information: pt needs to reschedule his nurse visit for his b12. please call to discuss.

## 2024-06-11 ENCOUNTER — CLINICAL SUPPORT (OUTPATIENT)
Dept: FAMILY MEDICINE | Facility: CLINIC | Age: 89
End: 2024-06-11
Payer: MEDICARE

## 2024-06-11 DIAGNOSIS — E53.8 B12 DEFICIENCY: Primary | ICD-10-CM

## 2024-06-11 PROCEDURE — 96372 THER/PROPH/DIAG INJ SC/IM: CPT | Mod: PBBFAC,PO

## 2024-06-11 PROCEDURE — 99211 OFF/OP EST MAY X REQ PHY/QHP: CPT | Mod: S$PBB,,, | Performed by: INTERNAL MEDICINE

## 2024-06-11 PROCEDURE — 99999PBSHW PR PBB SHADOW TECHNICAL ONLY FILED TO HB: Mod: PBBFAC,,,

## 2024-06-11 RX ADMIN — CYANOCOBALAMIN 1000 MCG: 1000 INJECTION, SOLUTION INTRAMUSCULAR at 09:06

## 2024-06-11 NOTE — PROGRESS NOTES
After obtaining consent, and per orders of Dr. Honeycutt, injection of cyanocobalamin 1000 mcg given by Seven Philippe. Patient instructed to remain in clinic for 20 minutes afterwards, and to report any adverse reaction to me immediately.

## 2024-07-10 ENCOUNTER — CLINICAL SUPPORT (OUTPATIENT)
Dept: FAMILY MEDICINE | Facility: CLINIC | Age: 89
End: 2024-07-10
Payer: MEDICARE

## 2024-07-10 VITALS — RESPIRATION RATE: 18 BRPM

## 2024-07-10 DIAGNOSIS — E53.8 B12 DEFICIENCY: Primary | ICD-10-CM

## 2024-07-10 PROCEDURE — 99999 PR PBB SHADOW E&M-EST. PATIENT-LVL I: CPT | Mod: PBBFAC,,,

## 2024-07-10 PROCEDURE — 96372 THER/PROPH/DIAG INJ SC/IM: CPT | Mod: PBBFAC,PO

## 2024-07-10 PROCEDURE — 99999PBSHW PR PBB SHADOW TECHNICAL ONLY FILED TO HB: Mod: PBBFAC,,,

## 2024-07-10 PROCEDURE — 99211 OFF/OP EST MAY X REQ PHY/QHP: CPT | Mod: S$PBB,,, | Performed by: INTERNAL MEDICINE

## 2024-07-10 PROCEDURE — 99211 OFF/OP EST MAY X REQ PHY/QHP: CPT | Mod: PBBFAC,PO,25

## 2024-07-10 RX ADMIN — CYANOCOBALAMIN 1000 MCG: 1000 INJECTION, SOLUTION INTRAMUSCULAR at 10:07

## 2024-07-10 NOTE — PROGRESS NOTES
After obtaining consent, and per orders of Dr. Judd Honeycutt, injection of cyanocobalamin 1,000 mcg given by Dylon Alicea. Patient instructed to remain in clinic for 20 minutes afterwards, and to report any adverse reaction to me immediately.  Pt. Eliazar.

## 2024-08-09 ENCOUNTER — CLINICAL SUPPORT (OUTPATIENT)
Dept: FAMILY MEDICINE | Facility: CLINIC | Age: 89
End: 2024-08-09
Payer: MEDICARE

## 2024-08-09 VITALS — RESPIRATION RATE: 19 BRPM

## 2024-08-09 DIAGNOSIS — E53.8 B12 DEFICIENCY: Primary | ICD-10-CM

## 2024-08-09 PROCEDURE — 99211 OFF/OP EST MAY X REQ PHY/QHP: CPT | Mod: PBBFAC,PO

## 2024-08-09 PROCEDURE — 99999 PR PBB SHADOW E&M-EST. PATIENT-LVL I: CPT | Mod: PBBFAC,,,

## 2024-08-09 RX ADMIN — CYANOCOBALAMIN 1000 MCG: 1000 INJECTION, SOLUTION INTRAMUSCULAR at 10:08

## 2024-09-09 ENCOUNTER — CLINICAL SUPPORT (OUTPATIENT)
Dept: FAMILY MEDICINE | Facility: CLINIC | Age: 89
End: 2024-09-09
Payer: MEDICARE

## 2024-09-09 DIAGNOSIS — E53.8 B12 DEFICIENCY: Primary | ICD-10-CM

## 2024-09-09 PROCEDURE — 99211 OFF/OP EST MAY X REQ PHY/QHP: CPT | Mod: S$PBB,,, | Performed by: INTERNAL MEDICINE

## 2024-09-09 PROCEDURE — 96372 THER/PROPH/DIAG INJ SC/IM: CPT | Mod: PBBFAC,PO

## 2024-09-09 PROCEDURE — 99999PBSHW PR PBB SHADOW TECHNICAL ONLY FILED TO HB: Mod: PBBFAC,,,

## 2024-09-09 RX ADMIN — CYANOCOBALAMIN 1000 MCG: 1000 INJECTION, SOLUTION INTRAMUSCULAR at 10:09

## 2024-10-09 ENCOUNTER — CLINICAL SUPPORT (OUTPATIENT)
Dept: FAMILY MEDICINE | Facility: CLINIC | Age: 89
End: 2024-10-09
Payer: MEDICARE

## 2024-10-09 VITALS
RESPIRATION RATE: 18 BRPM | HEART RATE: 80 BPM | SYSTOLIC BLOOD PRESSURE: 128 MMHG | OXYGEN SATURATION: 99 % | DIASTOLIC BLOOD PRESSURE: 74 MMHG

## 2024-10-09 DIAGNOSIS — E53.8 B12 DEFICIENCY: Primary | ICD-10-CM

## 2024-10-09 PROCEDURE — 99211 OFF/OP EST MAY X REQ PHY/QHP: CPT | Mod: S$PBB,,, | Performed by: INTERNAL MEDICINE

## 2024-10-09 PROCEDURE — 99999PBSHW PR PBB SHADOW TECHNICAL ONLY FILED TO HB: Mod: PBBFAC,,,

## 2024-10-09 PROCEDURE — 99999 PR PBB SHADOW E&M-EST. PATIENT-LVL II: CPT | Mod: PBBFAC,,,

## 2024-10-09 PROCEDURE — 96372 THER/PROPH/DIAG INJ SC/IM: CPT | Mod: PBBFAC,PO

## 2024-10-09 PROCEDURE — 99212 OFFICE O/P EST SF 10 MIN: CPT | Mod: PBBFAC,PO,25

## 2024-10-09 RX ADMIN — CYANOCOBALAMIN 1000 MCG: 1000 INJECTION, SOLUTION INTRAMUSCULAR at 10:10

## 2024-10-17 ENCOUNTER — LAB VISIT (OUTPATIENT)
Dept: LAB | Facility: HOSPITAL | Age: 89
End: 2024-10-17
Attending: INTERNAL MEDICINE
Payer: MEDICARE

## 2024-10-17 ENCOUNTER — OFFICE VISIT (OUTPATIENT)
Dept: FAMILY MEDICINE | Facility: CLINIC | Age: 89
End: 2024-10-17
Payer: MEDICARE

## 2024-10-17 VITALS
DIASTOLIC BLOOD PRESSURE: 78 MMHG | OXYGEN SATURATION: 97 % | HEART RATE: 72 BPM | TEMPERATURE: 97 F | BODY MASS INDEX: 25.27 KG/M2 | WEIGHT: 186.31 LBS | SYSTOLIC BLOOD PRESSURE: 112 MMHG

## 2024-10-17 DIAGNOSIS — E53.8 B12 DEFICIENCY: ICD-10-CM

## 2024-10-17 DIAGNOSIS — E78.2 MIXED HYPERLIPIDEMIA: Primary | ICD-10-CM

## 2024-10-17 DIAGNOSIS — I25.10 CORONARY ARTERY DISEASE, UNSPECIFIED VESSEL OR LESION TYPE, UNSPECIFIED WHETHER ANGINA PRESENT, UNSPECIFIED WHETHER NATIVE OR TRANSPLANTED HEART: ICD-10-CM

## 2024-10-17 DIAGNOSIS — E78.2 MIXED HYPERLIPIDEMIA: ICD-10-CM

## 2024-10-17 DIAGNOSIS — M65.331 TRIGGER MIDDLE FINGER OF RIGHT HAND: ICD-10-CM

## 2024-10-17 DIAGNOSIS — G62.9 PERIPHERAL POLYNEUROPATHY: ICD-10-CM

## 2024-10-17 DIAGNOSIS — Z23 NEED FOR VACCINATION: ICD-10-CM

## 2024-10-17 LAB
ALBUMIN SERPL BCP-MCNC: 3.6 G/DL (ref 3.5–5.2)
ALP SERPL-CCNC: 62 U/L (ref 40–150)
ALT SERPL W/O P-5'-P-CCNC: 18 U/L (ref 10–44)
ANION GAP SERPL CALC-SCNC: 7 MMOL/L (ref 8–16)
AST SERPL-CCNC: 20 U/L (ref 10–40)
BILIRUB SERPL-MCNC: 0.4 MG/DL (ref 0.1–1)
BUN SERPL-MCNC: 14 MG/DL (ref 10–30)
CALCIUM SERPL-MCNC: 9.3 MG/DL (ref 8.7–10.5)
CHLORIDE SERPL-SCNC: 107 MMOL/L (ref 95–110)
CHOLEST SERPL-MCNC: 156 MG/DL (ref 120–199)
CHOLEST/HDLC SERPL: 2.5 {RATIO} (ref 2–5)
CO2 SERPL-SCNC: 25 MMOL/L (ref 23–29)
CREAT SERPL-MCNC: 1.3 MG/DL (ref 0.5–1.4)
ERYTHROCYTE [DISTWIDTH] IN BLOOD BY AUTOMATED COUNT: 17.5 % (ref 11.5–14.5)
EST. GFR  (NO RACE VARIABLE): 51.9 ML/MIN/1.73 M^2
GLUCOSE SERPL-MCNC: 84 MG/DL (ref 70–110)
HCT VFR BLD AUTO: 31.3 % (ref 40–54)
HDLC SERPL-MCNC: 62 MG/DL (ref 40–75)
HDLC SERPL: 39.7 % (ref 20–50)
HGB BLD-MCNC: 9.4 G/DL (ref 14–18)
LDLC SERPL CALC-MCNC: 76.6 MG/DL (ref 63–159)
MCH RBC QN AUTO: 24 PG (ref 27–31)
MCHC RBC AUTO-ENTMCNC: 30 G/DL (ref 32–36)
MCV RBC AUTO: 80 FL (ref 82–98)
NONHDLC SERPL-MCNC: 94 MG/DL
PLATELET # BLD AUTO: 216 K/UL (ref 150–450)
PMV BLD AUTO: 12 FL (ref 9.2–12.9)
POTASSIUM SERPL-SCNC: 5 MMOL/L (ref 3.5–5.1)
PROT SERPL-MCNC: 6.9 G/DL (ref 6–8.4)
RBC # BLD AUTO: 3.91 M/UL (ref 4.6–6.2)
SODIUM SERPL-SCNC: 139 MMOL/L (ref 136–145)
TRIGL SERPL-MCNC: 87 MG/DL (ref 30–150)
VIT B12 SERPL-MCNC: 1465 PG/ML (ref 210–950)
WBC # BLD AUTO: 6.52 K/UL (ref 3.9–12.7)

## 2024-10-17 PROCEDURE — 99214 OFFICE O/P EST MOD 30 MIN: CPT | Mod: S$PBB,,, | Performed by: INTERNAL MEDICINE

## 2024-10-17 PROCEDURE — 80061 LIPID PANEL: CPT | Performed by: INTERNAL MEDICINE

## 2024-10-17 PROCEDURE — 99999PBSHW PR PBB SHADOW TECHNICAL ONLY FILED TO HB: Mod: PBBFAC,,,

## 2024-10-17 PROCEDURE — G2211 COMPLEX E/M VISIT ADD ON: HCPCS | Mod: S$PBB,,, | Performed by: INTERNAL MEDICINE

## 2024-10-17 PROCEDURE — 99999 PR PBB SHADOW E&M-EST. PATIENT-LVL III: CPT | Mod: PBBFAC,,, | Performed by: INTERNAL MEDICINE

## 2024-10-17 PROCEDURE — 85027 COMPLETE CBC AUTOMATED: CPT | Performed by: INTERNAL MEDICINE

## 2024-10-17 PROCEDURE — G0008 ADMIN INFLUENZA VIRUS VAC: HCPCS | Mod: PBBFAC,PO

## 2024-10-17 PROCEDURE — 82607 VITAMIN B-12: CPT | Performed by: INTERNAL MEDICINE

## 2024-10-17 PROCEDURE — 80053 COMPREHEN METABOLIC PANEL: CPT | Performed by: INTERNAL MEDICINE

## 2024-10-17 PROCEDURE — 36415 COLL VENOUS BLD VENIPUNCTURE: CPT | Mod: PO | Performed by: INTERNAL MEDICINE

## 2024-10-17 PROCEDURE — 99213 OFFICE O/P EST LOW 20 MIN: CPT | Mod: PBBFAC,PO | Performed by: INTERNAL MEDICINE

## 2024-10-17 PROCEDURE — 90653 IIV ADJUVANT VACCINE IM: CPT | Mod: PBBFAC,PO

## 2024-10-17 RX ORDER — GABAPENTIN 300 MG/1
300 CAPSULE ORAL 2 TIMES DAILY
Qty: 180 CAPSULE | Refills: 3 | Status: SHIPPED | OUTPATIENT
Start: 2024-10-17

## 2024-10-17 RX ORDER — PRAVASTATIN SODIUM 40 MG/1
40 TABLET ORAL DAILY
Qty: 90 TABLET | Refills: 3 | Status: SHIPPED | OUTPATIENT
Start: 2024-10-17

## 2024-10-17 RX ADMIN — INFLUENZA A VIRUS A/VICTORIA/4897/2022 IVR-238 (H1N1) ANTIGEN (FORMALDEHYDE INACTIVATED), INFLUENZA A VIRUS A/THAILAND/8/2022 IVR-237 (H3N2) ANTIGEN (FORMALDEHYDE INACTIVATED), INFLUENZA B VIRUS B/AUSTRIA/1359417/2021 BVR-26 ANTIGEN (FORMALDEHYDE INACTIVATED) 0.5 ML: 15; 15; 15 INJECTION, SUSPENSION INTRAMUSCULAR at 09:10

## 2024-10-17 NOTE — PROGRESS NOTES
Subjective     Zara Gonzáles is a 89 y.o. old, male here for No chief complaint on file.    89 y/o with PMH of mild CAD, HLD, peripheral neuropathy, B12 def     New problem with trigger finger on middle finger.    CAD: stable  HLD: on statin therapy, due for labs.  Neuropathy stable with gabapentin, bothersome but minimal.  Remains on IM B12 injections.    He has a few seconds of lightheadedness each day, but not consistently in any position or while doing anything. It passes on its own. No falls or dizziness.    Review of Systems   All other systems reviewed and are negative.    Medications     Outpatient Medications Marked as Taking for the 10/17/24 encounter (Office Visit) with Judd Honeycutt MD   Medication Sig Dispense Refill    artificial tears (TEARS LUBRICANT EYE DROP) 0.5 % ophthalmic solution Place 1 drop into both eyes daily as needed (dry eye (s)).      aspirin (ECOTRIN) 81 MG EC tablet Take 81 mg by mouth once daily.      clotrimazole (LOTRIMIN) 1 % cream Apply topically 2 (two) times daily. 45 g 0    multivit-min/FA/lycopen/lutein (CENTRUM SILVER MEN ORAL) Take 1 tablet by mouth once daily.      nitroGLYCERIN (NITROSTAT) 0.4 MG SL tablet Place 1 tablet (0.4 mg total) under the tongue every 5 (five) minutes as needed for Chest pain. 30 tablet 0    [DISCONTINUED] gabapentin (NEURONTIN) 300 MG capsule Take 1 capsule (300 mg total) by mouth 2 (two) times daily. 180 capsule 3    [DISCONTINUED] pravastatin (PRAVACHOL) 40 MG tablet Take 1 tablet (40 mg total) by mouth once daily. 90 tablet 3     Current Facility-Administered Medications for the 10/17/24 encounter (Office Visit) with Judd Honeycutt MD   Medication Dose Route Frequency Provider Last Rate Last Admin    cyanocobalamin injection 1,000 mcg  1,000 mcg Intramuscular Q30 Days Abdirahman Arrington MD   1,000 mcg at 02/09/24 1038    cyanocobalamin injection 1,000 mcg  1,000 mcg Intramuscular Q30 Days Judd Honeycutt MD   1,000 mcg at  10/09/24 1047     Objective     /78   Pulse 72   Temp 97 °F (36.1 °C)   Wt 84.5 kg (186 lb 4.6 oz)   SpO2 97%   BMI 25.27 kg/m²   Physical Exam  Constitutional:       General: He is not in acute distress.     Appearance: Normal appearance. He is well-developed.   HENT:      Head: Normocephalic and atraumatic.   Eyes:      Conjunctiva/sclera: Conjunctivae normal.      Pupils: Pupils are equal, round, and reactive to light.   Neck:      Thyroid: No thyroid mass or thyromegaly.      Vascular: Carotid bruit present.   Cardiovascular:      Rate and Rhythm: Normal rate and regular rhythm.      Heart sounds: Murmur heard.      Systolic murmur is present with a grade of 1/6.   Pulmonary:      Effort: No respiratory distress.      Breath sounds: Normal breath sounds.   Abdominal:      General: Bowel sounds are normal.      Palpations: Abdomen is soft.      Tenderness: There is no abdominal tenderness.   Musculoskeletal:         General: No deformity.      Cervical back: Neck supple.   Lymphadenopathy:      Cervical: No cervical adenopathy.      Upper Body:      Right upper body: No supraclavicular adenopathy.      Left upper body: No supraclavicular adenopathy.   Skin:     General: Skin is warm and dry.      Findings: No rash.   Neurological:      Mental Status: He is alert and oriented to person, place, and time.   Psychiatric:         Behavior: Behavior normal.       Assessment and Plan     Mixed hyperlipidemia  -     pravastatin (PRAVACHOL) 40 MG tablet; Take 1 tablet (40 mg total) by mouth once daily.  Dispense: 90 tablet; Refill: 3  -     Comprehensive Metabolic Panel; Future; Expected date: 10/17/2024  -     Lipid Panel; Future; Expected date: 10/17/2024    Coronary artery disease, unspecified vessel or lesion type, unspecified whether angina present, unspecified whether native or transplanted heart  -     CBC Without Differential; Future; Expected date: 10/17/2024    Peripheral polyneuropathy  -      gabapentin (NEURONTIN) 300 MG capsule; Take 1 capsule (300 mg total) by mouth 2 (two) times daily.  Dispense: 180 capsule; Refill: 3    B12 deficiency  -     Vitamin B12; Future; Expected date: 10/17/2024      Monitor lightheadedness, consider further work-up if more symptomatic.    ___________________  Judd Honeycutt MD  Internal Medicine and Pediatrics

## 2024-10-23 ENCOUNTER — PATIENT MESSAGE (OUTPATIENT)
Dept: FAMILY MEDICINE | Facility: CLINIC | Age: 89
End: 2024-10-23
Payer: MEDICARE

## 2024-10-23 DIAGNOSIS — D62 ACUTE BLOOD LOSS ANEMIA: Primary | ICD-10-CM

## 2024-10-23 RX ORDER — HEPARIN 100 UNIT/ML
500 SYRINGE INTRAVENOUS
OUTPATIENT
Start: 2024-10-24

## 2024-10-23 RX ORDER — DIPHENHYDRAMINE HYDROCHLORIDE 50 MG/ML
50 INJECTION INTRAMUSCULAR; INTRAVENOUS ONCE AS NEEDED
OUTPATIENT
Start: 2024-10-24

## 2024-10-23 RX ORDER — SODIUM CHLORIDE 0.9 % (FLUSH) 0.9 %
10 SYRINGE (ML) INJECTION
OUTPATIENT
Start: 2024-10-24

## 2024-10-23 RX ORDER — EPINEPHRINE 0.3 MG/.3ML
0.3 INJECTION SUBCUTANEOUS ONCE AS NEEDED
OUTPATIENT
Start: 2024-10-24

## 2024-10-30 ENCOUNTER — TELEPHONE (OUTPATIENT)
Dept: FAMILY MEDICINE | Facility: CLINIC | Age: 89
End: 2024-10-30

## 2024-10-30 DIAGNOSIS — D62 ACUTE BLOOD LOSS ANEMIA: Primary | ICD-10-CM

## 2024-10-30 RX ORDER — FERROUS SULFATE 325(65) MG
325 TABLET ORAL
Qty: 36 TABLET | Refills: 1 | Status: SHIPPED | OUTPATIENT
Start: 2024-10-30 | End: 2025-01-28

## 2024-10-30 NOTE — TELEPHONE ENCOUNTER
He still needs to stop the aspirin and he can take the iron supplement I am sending to his pharmacy.

## 2024-10-30 NOTE — TELEPHONE ENCOUNTER
----- Message from Judd Honeycutt MD sent at 10/23/2024 12:39 PM CDT -----  Please call the patient and let him know he is anemic. He probably has a small amount of bleeding that has happened over a long period of time in his colon. This probably explains his lightheadedness. I want him to stop taking aspirin (if he is taking it). I also want him to get scheduled for iron infusions at the cancer center, orders have been placed. I want to repeat his CBC in one month.

## 2024-10-30 NOTE — TELEPHONE ENCOUNTER
Called pt and spoke to him. Informed of results from PCP.     He doesn't want to do infusions. He would like to know if he can do something else.     Please advise

## 2024-10-31 ENCOUNTER — TELEPHONE (OUTPATIENT)
Dept: INFUSION THERAPY | Facility: HOSPITAL | Age: 89
End: 2024-10-31
Payer: MEDICARE

## 2024-11-04 ENCOUNTER — TELEPHONE (OUTPATIENT)
Dept: FAMILY MEDICINE | Facility: CLINIC | Age: 89
End: 2024-11-04
Payer: MEDICARE

## 2024-11-04 NOTE — TELEPHONE ENCOUNTER
----- Message from Thelma sent at 11/4/2024  1:15 PM CST -----  Regarding: advice  Type:  Needs Medical Advice    Who Called: pt    Best Call Back Number: [phone]      Additional Information: pt wants to reschedule his 12/09 b12 shot to another day. He would take any day besides that Monday.   please call to discuss.

## 2024-11-06 ENCOUNTER — INFUSION (OUTPATIENT)
Dept: INFUSION THERAPY | Facility: HOSPITAL | Age: 89
End: 2024-11-06
Attending: INTERNAL MEDICINE
Payer: MEDICARE

## 2024-11-06 VITALS
WEIGHT: 183 LBS | OXYGEN SATURATION: 94 % | TEMPERATURE: 98 F | HEART RATE: 59 BPM | RESPIRATION RATE: 16 BRPM | DIASTOLIC BLOOD PRESSURE: 72 MMHG | BODY MASS INDEX: 24.79 KG/M2 | SYSTOLIC BLOOD PRESSURE: 132 MMHG | HEIGHT: 72 IN

## 2024-11-06 DIAGNOSIS — D62 ACUTE BLOOD LOSS ANEMIA: Primary | ICD-10-CM

## 2024-11-06 PROCEDURE — 63600175 PHARM REV CODE 636 W HCPCS: Mod: JZ,JG,PN | Performed by: INTERNAL MEDICINE

## 2024-11-06 PROCEDURE — 96374 THER/PROPH/DIAG INJ IV PUSH: CPT | Mod: PN

## 2024-11-06 PROCEDURE — 25000003 PHARM REV CODE 250: Mod: PN | Performed by: INTERNAL MEDICINE

## 2024-11-06 RX ORDER — EPINEPHRINE 0.3 MG/.3ML
0.3 INJECTION SUBCUTANEOUS ONCE AS NEEDED
Status: DISCONTINUED | OUTPATIENT
Start: 2024-11-06 | End: 2024-11-06 | Stop reason: HOSPADM

## 2024-11-06 RX ORDER — HEPARIN 100 UNIT/ML
500 SYRINGE INTRAVENOUS
OUTPATIENT
Start: 2024-11-06

## 2024-11-06 RX ORDER — DIPHENHYDRAMINE HYDROCHLORIDE 50 MG/ML
50 INJECTION INTRAMUSCULAR; INTRAVENOUS ONCE AS NEEDED
OUTPATIENT
Start: 2024-11-06

## 2024-11-06 RX ORDER — SODIUM CHLORIDE 0.9 % (FLUSH) 0.9 %
10 SYRINGE (ML) INJECTION
OUTPATIENT
Start: 2024-11-06

## 2024-11-06 RX ORDER — DIPHENHYDRAMINE HYDROCHLORIDE 50 MG/ML
50 INJECTION INTRAMUSCULAR; INTRAVENOUS ONCE AS NEEDED
Status: DISCONTINUED | OUTPATIENT
Start: 2024-11-06 | End: 2024-11-06 | Stop reason: HOSPADM

## 2024-11-06 RX ORDER — SODIUM CHLORIDE 0.9 % (FLUSH) 0.9 %
10 SYRINGE (ML) INJECTION
Status: DISCONTINUED | OUTPATIENT
Start: 2024-11-06 | End: 2024-11-06 | Stop reason: HOSPADM

## 2024-11-06 RX ORDER — EPINEPHRINE 0.3 MG/.3ML
0.3 INJECTION SUBCUTANEOUS ONCE AS NEEDED
OUTPATIENT
Start: 2024-11-06

## 2024-11-06 RX ADMIN — FERUMOXYTOL 510 MG: 510 INJECTION INTRAVENOUS at 09:11

## 2024-11-06 NOTE — PLAN OF CARE
Problem: Adult Inpatient Plan of Care  Goal: Plan of Care Review  Outcome: Progressing  Flowsheets (Taken 11/6/2024 1036)  Plan of Care Reviewed With: patient  Goal: Patient-Specific Goal (Individualized)  Outcome: Progressing  Flowsheets (Taken 11/6/2024 1036)  Individualized Care Needs: recliner, pillow, TV, conversation  Anxieties, Fears or Concerns: none today  Patient/Family-Specific Goals (Include Timeframe): no s/s of reaction with infusion     Problem: Fatigue  Goal: Improved Activity Tolerance  Outcome: Progressing  Intervention: Promote Improved Energy  Flowsheets (Taken 11/6/2024 1036)  Sleep/Rest Enhancement: room darkened  Activity Management:   Ambulated in nunes - L4   Up in chair - L3  Environmental Support:   calm environment promoted   distractions minimized   Pt tolerated feraheme infusion well.  No adverse reaction noted.   IV flushed with NS and D/C per protocol.  Patient left clinic in no acute distress.

## 2024-11-08 ENCOUNTER — CLINICAL SUPPORT (OUTPATIENT)
Dept: FAMILY MEDICINE | Facility: CLINIC | Age: 89
End: 2024-11-08
Payer: MEDICARE

## 2024-11-08 VITALS — RESPIRATION RATE: 18 BRPM

## 2024-11-08 DIAGNOSIS — E53.8 B12 DEFICIENCY: Primary | ICD-10-CM

## 2024-11-08 RX ORDER — CYANOCOBALAMIN 1000 UG/ML
1000 INJECTION, SOLUTION INTRAMUSCULAR; SUBCUTANEOUS
Status: SHIPPED | OUTPATIENT
Start: 2024-11-08 | End: 2025-11-03

## 2024-11-08 RX ADMIN — CYANOCOBALAMIN 1000 MCG: 1000 INJECTION, SOLUTION INTRAMUSCULAR at 10:11

## 2024-11-13 ENCOUNTER — INFUSION (OUTPATIENT)
Dept: INFUSION THERAPY | Facility: HOSPITAL | Age: 89
End: 2024-11-13
Attending: INTERNAL MEDICINE
Payer: MEDICARE

## 2024-11-13 VITALS
BODY MASS INDEX: 24.79 KG/M2 | HEART RATE: 54 BPM | HEIGHT: 72 IN | RESPIRATION RATE: 18 BRPM | TEMPERATURE: 98 F | SYSTOLIC BLOOD PRESSURE: 127 MMHG | WEIGHT: 183 LBS | OXYGEN SATURATION: 99 % | DIASTOLIC BLOOD PRESSURE: 68 MMHG

## 2024-11-13 DIAGNOSIS — D62 ACUTE BLOOD LOSS ANEMIA: Primary | ICD-10-CM

## 2024-11-13 PROCEDURE — 63600175 PHARM REV CODE 636 W HCPCS: Mod: JZ,JG,PN | Performed by: INTERNAL MEDICINE

## 2024-11-13 PROCEDURE — 96374 THER/PROPH/DIAG INJ IV PUSH: CPT | Mod: PN

## 2024-11-13 PROCEDURE — 25000003 PHARM REV CODE 250: Mod: PN | Performed by: INTERNAL MEDICINE

## 2024-11-13 RX ORDER — SODIUM CHLORIDE 0.9 % (FLUSH) 0.9 %
10 SYRINGE (ML) INJECTION
Status: DISCONTINUED | OUTPATIENT
Start: 2024-11-13 | End: 2024-11-13 | Stop reason: HOSPADM

## 2024-11-13 RX ORDER — EPINEPHRINE 0.3 MG/.3ML
0.3 INJECTION SUBCUTANEOUS ONCE AS NEEDED
OUTPATIENT
Start: 2024-11-13

## 2024-11-13 RX ORDER — DIPHENHYDRAMINE HYDROCHLORIDE 50 MG/ML
50 INJECTION INTRAMUSCULAR; INTRAVENOUS ONCE AS NEEDED
OUTPATIENT
Start: 2024-11-13

## 2024-11-13 RX ORDER — SODIUM CHLORIDE 0.9 % (FLUSH) 0.9 %
10 SYRINGE (ML) INJECTION
Status: CANCELLED | OUTPATIENT
Start: 2024-11-13

## 2024-11-13 RX ORDER — HEPARIN 100 UNIT/ML
500 SYRINGE INTRAVENOUS
OUTPATIENT
Start: 2024-11-13

## 2024-11-13 RX ADMIN — FERUMOXYTOL 510 MG: 510 INJECTION INTRAVENOUS at 09:11

## 2024-11-13 RX ADMIN — SODIUM CHLORIDE: 9 INJECTION, SOLUTION INTRAVENOUS at 09:11

## 2024-11-13 NOTE — PLAN OF CARE
Tolerated feraheme well.  Observed pt 30 minutes post, no reactions noted.  No questions or concerns at this time.  Ambulated off unit in NAD.

## 2024-12-10 ENCOUNTER — CLINICAL SUPPORT (OUTPATIENT)
Dept: FAMILY MEDICINE | Facility: CLINIC | Age: 89
End: 2024-12-10
Payer: MEDICARE

## 2024-12-10 DIAGNOSIS — E53.8 B12 DEFICIENCY: Primary | ICD-10-CM

## 2024-12-10 PROCEDURE — 99999PBSHW PR PBB SHADOW TECHNICAL ONLY FILED TO HB: Mod: PBBFAC,,,

## 2024-12-10 PROCEDURE — 99211 OFF/OP EST MAY X REQ PHY/QHP: CPT | Mod: S$PBB,,, | Performed by: INTERNAL MEDICINE

## 2024-12-10 PROCEDURE — 96372 THER/PROPH/DIAG INJ SC/IM: CPT | Mod: PBBFAC,PO

## 2024-12-10 RX ADMIN — CYANOCOBALAMIN 1000 MCG: 1000 INJECTION, SOLUTION INTRAMUSCULAR at 09:12

## 2025-01-08 ENCOUNTER — CLINICAL SUPPORT (OUTPATIENT)
Dept: FAMILY MEDICINE | Facility: CLINIC | Age: OVER 89
End: 2025-01-08
Payer: MEDICARE

## 2025-01-08 DIAGNOSIS — E53.8 B12 DEFICIENCY: Primary | ICD-10-CM

## 2025-01-08 PROCEDURE — 96372 THER/PROPH/DIAG INJ SC/IM: CPT | Mod: PBBFAC,PO

## 2025-01-08 PROCEDURE — 99999PBSHW PR PBB SHADOW TECHNICAL ONLY FILED TO HB: Mod: PBBFAC,,,

## 2025-01-08 PROCEDURE — 99211 OFF/OP EST MAY X REQ PHY/QHP: CPT | Mod: S$PBB,,, | Performed by: INTERNAL MEDICINE

## 2025-01-08 RX ADMIN — CYANOCOBALAMIN 1000 MCG: 1000 INJECTION, SOLUTION INTRAMUSCULAR at 10:01

## 2025-02-24 DIAGNOSIS — Z00.00 ENCOUNTER FOR MEDICARE ANNUAL WELLNESS EXAM: ICD-10-CM

## 2025-03-03 ENCOUNTER — TELEPHONE (OUTPATIENT)
Dept: UROLOGY | Facility: CLINIC | Age: OVER 89
End: 2025-03-03
Payer: MEDICARE

## 2025-03-03 NOTE — TELEPHONE ENCOUNTER
----- Message from RT Moira sent at 3/3/2025 11:48 AM CST -----  Regarding: [facility] ER Visit f/u 3/2/2025  Please call patient to schedule appointment for further evaluation/treatment for painless hematuria.Moira Jose, [facility] Facility[facility] Navigator/Case Management [phone]

## 2025-03-10 ENCOUNTER — CLINICAL SUPPORT (OUTPATIENT)
Dept: FAMILY MEDICINE | Facility: CLINIC | Age: OVER 89
End: 2025-03-10
Payer: MEDICARE

## 2025-03-10 DIAGNOSIS — E53.8 B12 DEFICIENCY: Primary | ICD-10-CM

## 2025-03-10 PROCEDURE — 99999PBSHW PR PBB SHADOW TECHNICAL ONLY FILED TO HB: Mod: PBBFAC,,,

## 2025-03-10 PROCEDURE — 99211 OFF/OP EST MAY X REQ PHY/QHP: CPT | Mod: S$PBB,,, | Performed by: INTERNAL MEDICINE

## 2025-03-10 PROCEDURE — 96372 THER/PROPH/DIAG INJ SC/IM: CPT | Mod: PBBFAC,PO

## 2025-03-10 RX ADMIN — CYANOCOBALAMIN 1000 MCG: 1000 INJECTION, SOLUTION INTRAMUSCULAR at 10:03

## 2025-04-10 ENCOUNTER — CLINICAL SUPPORT (OUTPATIENT)
Dept: FAMILY MEDICINE | Facility: CLINIC | Age: OVER 89
End: 2025-04-10
Payer: MEDICARE

## 2025-04-10 DIAGNOSIS — E53.8 B12 DEFICIENCY: Primary | ICD-10-CM

## 2025-04-10 PROCEDURE — 99211 OFF/OP EST MAY X REQ PHY/QHP: CPT | Mod: S$PBB,,, | Performed by: INTERNAL MEDICINE

## 2025-04-10 PROCEDURE — 99999PBSHW PR PBB SHADOW TECHNICAL ONLY FILED TO HB: Mod: PBBFAC,,,

## 2025-04-10 PROCEDURE — 96372 THER/PROPH/DIAG INJ SC/IM: CPT | Mod: PBBFAC,PO

## 2025-04-10 RX ADMIN — CYANOCOBALAMIN 1000 MCG: 1000 INJECTION, SOLUTION INTRAMUSCULAR at 10:04

## 2025-04-11 DIAGNOSIS — D62 ACUTE BLOOD LOSS ANEMIA: ICD-10-CM

## 2025-04-11 RX ORDER — FERROUS SULFATE 325(65) MG
TABLET, DELAYED RELEASE (ENTERIC COATED) ORAL
Qty: 36 TABLET | Refills: 2 | Status: SHIPPED | OUTPATIENT
Start: 2025-04-11

## 2025-04-11 NOTE — TELEPHONE ENCOUNTER
Refill Routing Note   Medication(s) are not appropriate for processing by [facility] for the following reason(s):        Outside of protocol    [facility] action(s):  Route               Appointments  past 12m or future 3m with PCP    Date Provider   Last Visit   10/17/2024 Judd Honeycutt MD   Next Visit   4/17/2025 Judd Honeycutt MD   ED visits in past 90 days: 1        Note composed:10:19 AM 04/11/2025

## 2025-04-17 ENCOUNTER — LAB VISIT (OUTPATIENT)
Dept: LAB | Facility: HOSPITAL | Age: OVER 89
End: 2025-04-17
Attending: INTERNAL MEDICINE
Payer: MEDICARE

## 2025-04-17 ENCOUNTER — OFFICE VISIT (OUTPATIENT)
Dept: FAMILY MEDICINE | Facility: CLINIC | Age: OVER 89
End: 2025-04-17
Payer: MEDICARE

## 2025-04-17 VITALS
HEIGHT: 72 IN | BODY MASS INDEX: 24.85 KG/M2 | OXYGEN SATURATION: 95 % | HEART RATE: 77 BPM | DIASTOLIC BLOOD PRESSURE: 82 MMHG | SYSTOLIC BLOOD PRESSURE: 130 MMHG | WEIGHT: 183.44 LBS

## 2025-04-17 DIAGNOSIS — D62 ACUTE BLOOD LOSS ANEMIA: ICD-10-CM

## 2025-04-17 DIAGNOSIS — E78.2 MIXED HYPERLIPIDEMIA: ICD-10-CM

## 2025-04-17 DIAGNOSIS — I25.10 CORONARY ARTERY DISEASE, UNSPECIFIED VESSEL OR LESION TYPE, UNSPECIFIED WHETHER ANGINA PRESENT, UNSPECIFIED WHETHER NATIVE OR TRANSPLANTED HEART: ICD-10-CM

## 2025-04-17 DIAGNOSIS — I77.810 ACQUIRED DILATION OF ASCENDING AORTA AND AORTIC ROOT: ICD-10-CM

## 2025-04-17 DIAGNOSIS — I25.10 CORONARY ARTERY DISEASE, UNSPECIFIED VESSEL OR LESION TYPE, UNSPECIFIED WHETHER ANGINA PRESENT, UNSPECIFIED WHETHER NATIVE OR TRANSPLANTED HEART: Primary | ICD-10-CM

## 2025-04-17 DIAGNOSIS — R31.9 HEMATURIA, UNSPECIFIED TYPE: ICD-10-CM

## 2025-04-17 PROBLEM — J43.9 PULMONARY EMPHYSEMA, UNSPECIFIED EMPHYSEMA TYPE: Status: RESOLVED | Noted: 2023-10-17 | Resolved: 2025-04-17

## 2025-04-17 LAB
ALBUMIN SERPL BCP-MCNC: 3.6 G/DL (ref 3.5–5.2)
ALP SERPL-CCNC: 62 UNIT/L (ref 40–150)
ALT SERPL W/O P-5'-P-CCNC: 19 UNIT/L (ref 10–44)
ANION GAP (OHS): 7 MMOL/L (ref 8–16)
AST SERPL-CCNC: 24 UNIT/L (ref 11–45)
BILIRUB SERPL-MCNC: 0.6 MG/DL (ref 0.1–1)
BILIRUB UR QL STRIP.AUTO: NEGATIVE
BUN SERPL-MCNC: 16 MG/DL (ref 10–30)
CALCIUM SERPL-MCNC: 9.2 MG/DL (ref 8.7–10.5)
CHLORIDE SERPL-SCNC: 106 MMOL/L (ref 95–110)
CLARITY UR: CLEAR
CO2 SERPL-SCNC: 28 MMOL/L (ref 23–29)
COLOR UR AUTO: YELLOW
CREAT SERPL-MCNC: 1.1 MG/DL (ref 0.5–1.4)
ERYTHROCYTE [DISTWIDTH] IN BLOOD BY AUTOMATED COUNT: 14.6 % (ref 11.5–14.5)
GFR SERPLBLD CREATININE-BSD FMLA CKD-EPI: >60 ML/MIN/1.73/M2
GLUCOSE SERPL-MCNC: 68 MG/DL (ref 70–110)
GLUCOSE UR QL STRIP: NEGATIVE
HCT VFR BLD AUTO: 38.1 % (ref 40–54)
HGB BLD-MCNC: 12 GM/DL (ref 14–18)
HGB UR QL STRIP: NEGATIVE
KETONES UR QL STRIP: NEGATIVE
LEUKOCYTE ESTERASE UR QL STRIP: NEGATIVE
MCH RBC QN AUTO: 29.4 PG (ref 27–31)
MCHC RBC AUTO-ENTMCNC: 31.5 G/DL (ref 32–36)
MCV RBC AUTO: 93 FL (ref 82–98)
NITRITE UR QL STRIP: NEGATIVE
PH UR STRIP: 7 [PH]
PLATELET # BLD AUTO: 185 K/UL (ref 150–450)
PMV BLD AUTO: 11.8 FL (ref 9.2–12.9)
POTASSIUM SERPL-SCNC: 4.2 MMOL/L (ref 3.5–5.1)
PROT SERPL-MCNC: 7.1 GM/DL (ref 6–8.4)
PROT UR QL STRIP: NEGATIVE
RBC # BLD AUTO: 4.08 M/UL (ref 4.6–6.2)
SODIUM SERPL-SCNC: 141 MMOL/L (ref 136–145)
SP GR UR STRIP: <=1.005
WBC # BLD AUTO: 6.43 K/UL (ref 3.9–12.7)

## 2025-04-17 PROCEDURE — 99214 OFFICE O/P EST MOD 30 MIN: CPT | Mod: S$PBB,,, | Performed by: INTERNAL MEDICINE

## 2025-04-17 PROCEDURE — 80053 COMPREHEN METABOLIC PANEL: CPT

## 2025-04-17 PROCEDURE — 99999 PR PBB SHADOW E&M-EST. PATIENT-LVL III: CPT | Mod: PBBFAC,,, | Performed by: INTERNAL MEDICINE

## 2025-04-17 PROCEDURE — 36415 COLL VENOUS BLD VENIPUNCTURE: CPT | Mod: PO

## 2025-04-17 PROCEDURE — 99213 OFFICE O/P EST LOW 20 MIN: CPT | Mod: PBBFAC,PO | Performed by: INTERNAL MEDICINE

## 2025-04-17 PROCEDURE — 87086 URINE CULTURE/COLONY COUNT: CPT | Performed by: INTERNAL MEDICINE

## 2025-04-17 PROCEDURE — 81003 URINALYSIS AUTO W/O SCOPE: CPT | Mod: PO | Performed by: INTERNAL MEDICINE

## 2025-04-17 PROCEDURE — G2211 COMPLEX E/M VISIT ADD ON: HCPCS | Mod: S$PBB,,, | Performed by: INTERNAL MEDICINE

## 2025-04-17 PROCEDURE — 85027 COMPLETE CBC AUTOMATED: CPT

## 2025-04-17 NOTE — PROGRESS NOTES
Subjective     Zara Gonzáles is a 89 y.o. old, male here for Follow-up    89 y/o with PMH of mild CAD, HLD, peripheral neuropathy, B12 def     CAD: stable, denies CP or LUEVANO. Mild valvular regurg and diastolic dysfunction on last echo.  HLD: remains on statin therapy  Neuropathy stable  Records reviewed and it seems he went to to the ER last month for painless hematuria or blood oozing from his penis. He denies or doesn't recall this happening and denies any hematuria or  symptoms currently.    ROS  Medications     Outpatient Medications Marked as Taking for the 4/17/25 encounter (Office Visit) with Judd Honeycutt MD   Medication Sig Dispense Refill    artificial tears (TEARS LUBRICANT EYE DROP) 0.5 % ophthalmic solution Place 1 drop into both eyes daily as needed (dry eye (s)).      clotrimazole (LOTRIMIN) 1 % cream Apply topically 2 (two) times daily. 45 g 0    ferrous sulfate 325 (65 FE) MG EC tablet TAKE 1 TABLET BY MOUTH THREE DAYS A WEEK, EVERY MONDAY, WEDNESDAY, FRIDAY 36 tablet 2    gabapentin (NEURONTIN) 300 MG capsule Take 1 capsule (300 mg total) by mouth 2 (two) times daily. 180 capsule 3    multivit-min/FA/lycopen/lutein (CENTRUM SILVER MEN ORAL) Take 1 tablet by mouth once daily.      nitroGLYCERIN (NITROSTAT) 0.4 MG SL tablet Place 1 tablet (0.4 mg total) under the tongue every 5 (five) minutes as needed for Chest pain. 30 tablet 0    pravastatin (PRAVACHOL) 40 MG tablet Take 1 tablet (40 mg total) by mouth once daily. 90 tablet 3     Current Facility-Administered Medications for the 4/17/25 encounter (Office Visit) with Judd Honeycutt MD   Medication Dose Route Frequency Provider Last Rate Last Admin    cyanocobalamin injection 1,000 mcg  1,000 mcg Subcutaneous Q30 Days    1,000 mcg at 04/10/25 1031     Objective     /82   Pulse 77   Ht 6' (1.829 m)   Wt 83.2 kg (183 lb 6.8 oz)   SpO2 95%   BMI 24.88 kg/m²   Physical Exam  Constitutional:       General: He is not in  acute distress.     Appearance: Normal appearance. He is well-developed.   Cardiovascular:      Rate and Rhythm: Normal rate and regular rhythm.      Heart sounds: Murmur heard.      Systolic murmur is present with a grade of 2/6.   Pulmonary:      Effort: Pulmonary effort is normal. No respiratory distress.      Breath sounds: Normal breath sounds.   Musculoskeletal:      Right lower leg: No edema.      Left lower leg: No edema.       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  Continue medications and treatment as above for stable chronic medical problems.  F/u 6 mo  ___________________  Judd Honeycutt MD  Internal Medicine and Pediatrics

## 2025-04-18 LAB — BACTERIA UR CULT: NO GROWTH

## 2025-04-25 ENCOUNTER — RESULTS FOLLOW-UP (OUTPATIENT)
Dept: FAMILY MEDICINE | Facility: CLINIC | Age: OVER 89
End: 2025-04-25

## 2025-04-25 ENCOUNTER — TELEPHONE (OUTPATIENT)
Dept: FAMILY MEDICINE | Facility: CLINIC | Age: OVER 89
End: 2025-04-25
Payer: MEDICARE

## 2025-04-25 NOTE — TELEPHONE ENCOUNTER
----- Message from Judd Honeycutt MD sent at 4/25/2025  8:14 AM CDT -----  Please contact the patient and let them know that their results were fine and do not require any change in treatment.  ----- Message -----  From: Lab, Background User  Sent: 4/17/2025   6:15 PM CDT  To: Judd Honeycutt MD

## 2025-04-27 PROBLEM — I50.32 CHRONIC DIASTOLIC CHF (CONGESTIVE HEART FAILURE): Status: ACTIVE | Noted: 2025-04-27

## 2025-04-27 PROBLEM — J44.9 COPD (CHRONIC OBSTRUCTIVE PULMONARY DISEASE): Status: ACTIVE | Noted: 2023-10-17

## 2025-04-27 PROBLEM — E87.20 METABOLIC ACIDOSIS: Status: ACTIVE | Noted: 2025-04-27

## 2025-04-27 PROBLEM — I38 VHD (VALVULAR HEART DISEASE): Status: ACTIVE | Noted: 2025-04-27

## 2025-04-27 PROBLEM — N18.9 CKD (CHRONIC KIDNEY DISEASE): Status: ACTIVE | Noted: 2025-04-27

## 2025-05-06 ENCOUNTER — OFFICE VISIT (OUTPATIENT)
Dept: GASTROENTEROLOGY | Facility: CLINIC | Age: OVER 89
End: 2025-05-06
Payer: MEDICARE

## 2025-05-06 VITALS — BODY MASS INDEX: 24.67 KG/M2 | HEIGHT: 72 IN | WEIGHT: 182.13 LBS

## 2025-05-06 DIAGNOSIS — D50.0 ANEMIA DUE TO GI BLOOD LOSS: ICD-10-CM

## 2025-05-06 DIAGNOSIS — K92.2 LOWER GI BLEED: Primary | ICD-10-CM

## 2025-05-06 DIAGNOSIS — K57.30 DIVERTICULOSIS OF COLON: ICD-10-CM

## 2025-05-06 PROCEDURE — 99213 OFFICE O/P EST LOW 20 MIN: CPT | Mod: PBBFAC,PO | Performed by: INTERNAL MEDICINE

## 2025-05-06 PROCEDURE — 99213 OFFICE O/P EST LOW 20 MIN: CPT | Mod: S$PBB,,, | Performed by: INTERNAL MEDICINE

## 2025-05-06 PROCEDURE — 99999 PR PBB SHADOW E&M-EST. PATIENT-LVL III: CPT | Mod: PBBFAC,,, | Performed by: INTERNAL MEDICINE

## 2025-05-06 NOTE — PROGRESS NOTES
Subjective     Patient ID: Zara Gnozáles is a 89 y.o. male.    Chief Complaint: Follow-up    Mr. Gonzáles returns to GI clinic for follow-up after recent hospitalization with GI bleed.  He is normally followed by Dr. Mendez, who had seen him during that hospitalization.  He is without complaints at this time.  His stool has returned back to its normal appearance, for the last 3-4 days.  His medications do not include any blood thinners.  He is receiving physical therapy instruction at his home.  His appetite is good, and he is eating well.  No complaints referable to the upper GI tract.        See recent Colonoscopy 4/28/2025:  Indications:          Hematochezia, Acute post hemorrhagic anemia   Providers:             Javon Mendez MD   Impression:     - Diverticulosis in the sigmoid colon and in the descending colon.                          - One 10 mm polyp at the hepatic flexure.                          - Internal hemorrhoids.                          - The examination was otherwise normal. No active bleeding visualized.                          - No specimens collected.   Recommendation:        - [facility].                          - Repeat colonoscopy is not recommended for screening purposes.                          - Clear liquids only.                          - F/U H/H s/p PRBC tx.                          - If re-bleeds, recommend CTA for attempt at localization per Radiology +/- embolization with gen surg consult.   Javon Mendez MD   4/28/2025       Review of Systems   Constitutional:  Negative for appetite change, fever and unexpected weight change.   HENT: Negative.  Negative for mouth sores, sore throat and trouble swallowing.    Eyes: Negative.    Respiratory: Negative.  Negative for cough and choking.    Cardiovascular: Negative.  Negative for chest pain and leg swelling.   Gastrointestinal:  Negative for abdominal distention, abdominal pain,  anal bleeding, blood in stool (Stool looks normal now.), constipation, diarrhea, nausea and vomiting.   Genitourinary: Negative.    Musculoskeletal: Negative.    Integumentary:  Negative for color change and rash. Negative.   Neurological: Negative.    Hematological:  Negative for adenopathy.   Psychiatric/Behavioral: Negative.            Objective     Physical Exam  Vitals and nursing note reviewed.   Constitutional:       Appearance: Normal appearance. He is well-developed.   HENT:      Nose: Nose normal.      Mouth/Throat:      Mouth: Mucous membranes are moist.      Pharynx: No oropharyngeal exudate.   Eyes:      General: No scleral icterus.  Neck:      Thyroid: No thyromegaly.      Trachea: No tracheal deviation.   Cardiovascular:      Rate and Rhythm: Normal rate and regular rhythm.      Heart sounds: Normal heart sounds.   Pulmonary:      Effort: Pulmonary effort is normal.      Breath sounds: Normal breath sounds.   Abdominal:      General: Bowel sounds are normal. There is no distension.      Palpations: Abdomen is soft. There is no mass.      Tenderness: There is no abdominal tenderness. There is no guarding.   Lymphadenopathy:      Cervical: No cervical adenopathy.   Skin:     General: Skin is warm and dry.      Findings: No rash.   Neurological:      General: No focal deficit present.      Mental Status: He is alert and oriented to person, place, and time.   Psychiatric:         Mood and Affect: Mood normal.         Behavior: Behavior normal.            Assessment and Plan     Lower GI bleed    Anemia due to GI blood loss    Diverticulosis of colon      Continue medicines the same.  High-fiber diet, with fiber supplement.  Follow-up with Dr. Mendez for any further GI issues.

## 2025-05-09 ENCOUNTER — CLINICAL SUPPORT (OUTPATIENT)
Dept: FAMILY MEDICINE | Facility: CLINIC | Age: OVER 89
End: 2025-05-09
Payer: MEDICARE

## 2025-05-09 DIAGNOSIS — E53.8 B12 DEFICIENCY: Primary | ICD-10-CM

## 2025-05-09 RX ORDER — CYANOCOBALAMIN 1000 UG/ML
1000 INJECTION, SOLUTION INTRAMUSCULAR; SUBCUTANEOUS
Status: SHIPPED | OUTPATIENT
Start: 2025-05-09 | End: 2025-10-06

## 2025-05-09 RX ADMIN — CYANOCOBALAMIN 1000 MCG: 1000 INJECTION, SOLUTION INTRAMUSCULAR; SUBCUTANEOUS at 10:05

## 2025-05-09 NOTE — PROGRESS NOTES
After obtaining consent, and per orders of Dr. Honeycutt, injection of B12 given by Beatriz Allen. Patient instructed to remain in clinic for 20 minutes afterwards, and to report any adverse reaction to me immediately.

## 2025-06-09 ENCOUNTER — CLINICAL SUPPORT (OUTPATIENT)
Dept: FAMILY MEDICINE | Facility: CLINIC | Age: OVER 89
End: 2025-06-09
Payer: MEDICARE

## 2025-06-09 DIAGNOSIS — E53.8 B12 DEFICIENCY: Primary | ICD-10-CM

## 2025-06-09 PROCEDURE — 99999PBSHW PR PBB SHADOW TECHNICAL ONLY FILED TO HB: Mod: PBBFAC,,,

## 2025-06-09 PROCEDURE — 96372 THER/PROPH/DIAG INJ SC/IM: CPT | Mod: PBBFAC,PO

## 2025-06-09 RX ADMIN — CYANOCOBALAMIN 1000 MCG: 1000 INJECTION, SOLUTION INTRAMUSCULAR at 10:06

## 2025-06-09 NOTE — PROGRESS NOTES
After obtaining consent, and per orders of , injection of B12 given by Beatriz Allen. Patient instructed to remain in clinic for 20 minutes afterwards, and to report any adverse reaction to me immediately.

## 2025-07-10 ENCOUNTER — CLINICAL SUPPORT (OUTPATIENT)
Dept: FAMILY MEDICINE | Facility: CLINIC | Age: OVER 89
End: 2025-07-10
Payer: MEDICARE

## 2025-07-10 VITALS — RESPIRATION RATE: 19 BRPM

## 2025-07-10 DIAGNOSIS — E53.8 B12 DEFICIENCY: Primary | ICD-10-CM

## 2025-07-10 PROCEDURE — 96372 THER/PROPH/DIAG INJ SC/IM: CPT | Mod: PBBFAC,PO

## 2025-07-10 PROCEDURE — 99999PBSHW PR PBB SHADOW TECHNICAL ONLY FILED TO HB: Mod: PBBFAC,,,

## 2025-07-10 RX ADMIN — CYANOCOBALAMIN 1000 MCG: 1000 INJECTION, SOLUTION INTRAMUSCULAR at 10:07

## 2025-07-10 NOTE — PROGRESS NOTES
After obtaining consent, and per orders of Dr. Honeycutt, injection of cyanocobalamin 1,000 mcg given by Dylon Rose. Patient instructed to remain in clinic for 20 minutes afterwards, and to report any adverse reaction to me immediately. PtCarmen Perea.

## 2025-07-12 NOTE — TELEPHONE ENCOUNTER
----- Message from Suzanna Andrews sent at 10/9/2020  8:16 AM CDT -----  Please reach out to pt on the office needing to reschedule his 10/15/2020 appointment [phone]     show

## 2025-08-11 ENCOUNTER — CLINICAL SUPPORT (OUTPATIENT)
Dept: FAMILY MEDICINE | Facility: CLINIC | Age: OVER 89
End: 2025-08-11
Payer: MEDICARE

## 2025-08-11 VITALS — HEART RATE: 81 BPM | OXYGEN SATURATION: 98 %

## 2025-08-11 DIAGNOSIS — E53.8 B12 DEFICIENCY: Primary | ICD-10-CM

## 2025-08-11 PROCEDURE — 99213 OFFICE O/P EST LOW 20 MIN: CPT | Mod: PBBFAC,PO

## 2025-08-11 PROCEDURE — 96372 THER/PROPH/DIAG INJ SC/IM: CPT | Mod: PBBFAC,PO

## 2025-08-11 PROCEDURE — 99999PBSHW PR PBB SHADOW TECHNICAL ONLY FILED TO HB: Mod: PBBFAC,,,

## 2025-08-11 PROCEDURE — 99999 PR PBB SHADOW E&M-EST. PATIENT-LVL III: CPT | Mod: PBBFAC,,,

## 2025-08-11 RX ADMIN — CYANOCOBALAMIN 1000 MCG: 1000 INJECTION, SOLUTION INTRAMUSCULAR at 10:08
